# Patient Record
Sex: MALE | Race: BLACK OR AFRICAN AMERICAN | Employment: UNEMPLOYED
[De-identification: names, ages, dates, MRNs, and addresses within clinical notes are randomized per-mention and may not be internally consistent; named-entity substitution may affect disease eponyms.]

---

## 2023-07-26 ENCOUNTER — HOSPITAL ENCOUNTER (EMERGENCY)
Facility: HOSPITAL | Age: 73
Discharge: HOME HEALTH | End: 2023-07-26
Attending: STUDENT IN AN ORGANIZED HEALTH CARE EDUCATION/TRAINING PROGRAM
Payer: MEDICARE

## 2023-07-26 ENCOUNTER — APPOINTMENT (OUTPATIENT)
Facility: HOSPITAL | Age: 73
End: 2023-07-26
Payer: MEDICARE

## 2023-07-26 VITALS
BODY MASS INDEX: 24.92 KG/M2 | DIASTOLIC BLOOD PRESSURE: 91 MMHG | RESPIRATION RATE: 20 BRPM | OXYGEN SATURATION: 100 % | HEIGHT: 71 IN | WEIGHT: 178 LBS | HEART RATE: 70 BPM | TEMPERATURE: 97.9 F | SYSTOLIC BLOOD PRESSURE: 133 MMHG

## 2023-07-26 DIAGNOSIS — L03.90 CELLULITIS, UNSPECIFIED CELLULITIS SITE: Primary | ICD-10-CM

## 2023-07-26 LAB
ALBUMIN SERPL-MCNC: 4 G/DL (ref 3.4–5)
ALBUMIN/GLOB SERPL: 0.9 (ref 0.8–1.7)
ALP SERPL-CCNC: 78 U/L (ref 45–117)
ALT SERPL-CCNC: 19 U/L (ref 16–61)
ANION GAP SERPL CALC-SCNC: 5 MMOL/L (ref 3–18)
AST SERPL-CCNC: 9 U/L (ref 10–38)
BASOPHILS # BLD: 0 K/UL (ref 0–0.1)
BASOPHILS NFR BLD: 1 % (ref 0–2)
BILIRUB SERPL-MCNC: 0.8 MG/DL (ref 0.2–1)
BUN SERPL-MCNC: 32 MG/DL (ref 7–18)
BUN/CREAT SERPL: 14 (ref 12–20)
CALCIUM SERPL-MCNC: 9.6 MG/DL (ref 8.5–10.1)
CHLORIDE SERPL-SCNC: 109 MMOL/L (ref 100–111)
CO2 SERPL-SCNC: 25 MMOL/L (ref 21–32)
CREAT SERPL-MCNC: 2.27 MG/DL (ref 0.6–1.3)
CRP SERPL-MCNC: 1 MG/DL (ref 0–0.3)
DIFFERENTIAL METHOD BLD: ABNORMAL
EOSINOPHIL # BLD: 0.3 K/UL (ref 0–0.4)
EOSINOPHIL NFR BLD: 4 % (ref 0–5)
ERYTHROCYTE [DISTWIDTH] IN BLOOD BY AUTOMATED COUNT: 12.2 % (ref 11.6–14.5)
ERYTHROCYTE [SEDIMENTATION RATE] IN BLOOD: 6 MM/HR (ref 0–20)
GLOBULIN SER CALC-MCNC: 4.6 G/DL (ref 2–4)
GLUCOSE SERPL-MCNC: 113 MG/DL (ref 74–99)
HCT VFR BLD AUTO: 47.1 % (ref 36–48)
HGB BLD-MCNC: 15.1 G/DL (ref 13–16)
IMM GRANULOCYTES # BLD AUTO: 0 K/UL (ref 0–0.04)
IMM GRANULOCYTES NFR BLD AUTO: 0 % (ref 0–0.5)
LYMPHOCYTES # BLD: 1.1 K/UL (ref 0.9–3.6)
LYMPHOCYTES NFR BLD: 17 % (ref 21–52)
MCH RBC QN AUTO: 27.6 PG (ref 24–34)
MCHC RBC AUTO-ENTMCNC: 32.1 G/DL (ref 31–37)
MCV RBC AUTO: 85.9 FL (ref 78–100)
MONOCYTES # BLD: 0.6 K/UL (ref 0.05–1.2)
MONOCYTES NFR BLD: 10 % (ref 3–10)
NEUTS SEG # BLD: 4.2 K/UL (ref 1.8–8)
NEUTS SEG NFR BLD: 68 % (ref 40–73)
NRBC # BLD: 0 K/UL (ref 0–0.01)
NRBC BLD-RTO: 0 PER 100 WBC
PLATELET # BLD AUTO: 255 K/UL (ref 135–420)
PMV BLD AUTO: 9.8 FL (ref 9.2–11.8)
POTASSIUM SERPL-SCNC: 4.3 MMOL/L (ref 3.5–5.5)
PROT SERPL-MCNC: 8.6 G/DL (ref 6.4–8.2)
RBC # BLD AUTO: 5.48 M/UL (ref 4.35–5.65)
SODIUM SERPL-SCNC: 139 MMOL/L (ref 136–145)
WBC # BLD AUTO: 6.2 K/UL (ref 4.6–13.2)

## 2023-07-26 PROCEDURE — 80053 COMPREHEN METABOLIC PANEL: CPT

## 2023-07-26 PROCEDURE — 85652 RBC SED RATE AUTOMATED: CPT

## 2023-07-26 PROCEDURE — 73630 X-RAY EXAM OF FOOT: CPT

## 2023-07-26 PROCEDURE — 99284 EMERGENCY DEPT VISIT MOD MDM: CPT

## 2023-07-26 PROCEDURE — 86140 C-REACTIVE PROTEIN: CPT

## 2023-07-26 PROCEDURE — 85025 COMPLETE CBC W/AUTO DIFF WBC: CPT

## 2023-07-26 RX ORDER — CEPHALEXIN 500 MG/1
500 CAPSULE ORAL 4 TIMES DAILY
Qty: 28 CAPSULE | Refills: 0 | Status: SHIPPED | OUTPATIENT
Start: 2023-07-26 | End: 2023-08-02

## 2023-07-26 ASSESSMENT — PAIN - FUNCTIONAL ASSESSMENT: PAIN_FUNCTIONAL_ASSESSMENT: NONE - DENIES PAIN

## 2023-07-26 ASSESSMENT — ENCOUNTER SYMPTOMS
EYE DISCHARGE: 0
SHORTNESS OF BREATH: 0
ABDOMINAL PAIN: 0
DIARRHEA: 0
NAUSEA: 0
SORE THROAT: 0
COUGH: 0

## 2023-07-26 NOTE — ED PROVIDER NOTES
likely also gout arthropathy   versus other inflammatory arthropathy and superimposed secondary osteoarthrosis. Medical Decision Making   I am the first provider for this patient. I reviewed the vital signs, available nursing notes, past medical history, past surgical history, family history and social history. Vital Signs-Reviewed the patient's vital signs. EKG: All EKG's are interpreted by the Emergency Department Physician who either signs or Co-signs this chart in the absence of a cardiologist.    Records Reviewed: Nursing Notes and Old Medical Records     Procedures: None   Procedures    Provider Notes (Medical Decision Making):     Differential: Osteomyelitis, abscess, cellulitis, ulceration    Plan: We will order x-ray to evaluate for osteomyelitis although I have a low suspicion for this. Will order basic labs. ED Course as of 07/27/23 1343   Wed Jul 26, 2023   1201 Have reviewed labs and added sed rate and crp [CS]      ED Course User Index  [CS] ANGEL Paulino     Discussed overall reassuring work-up with patient. Extremely low suspicion for septic joint given patient does not have pain with range of motion is afebrile, reassuring white blood cell count and normal sed rate. More concern for cellulitis. Will discharge home with antibiotics and have stressed that the patient call podiatry tomorrow first thing and make a close follow-up appointment. Return precautions given. Was discharged home. MED RECONCILIATION:  No current facility-administered medications for this encounter. Current Outpatient Medications   Medication Sig    cephALEXin (KEFLEX) 500 MG capsule Take 1 capsule by mouth 4 times daily for 7 days       Disposition:  Home     DISCHARGE NOTE:   Pt has been reexamined. Patient has no new complaints, changes, or physical findings. Care plan outlined and precautions discussed. Results of workup were reviewed with the patient.  All medications were

## 2023-07-26 NOTE — ED TRIAGE NOTES
Pt presents with sore to top of right foot, reports it has been there for a few days. Denies hx of DM, hx of severe arthritis.

## 2023-07-26 NOTE — ED NOTES
SMALL WOUND NOTED TO RIGHT LOWER FOOT, NO DRAINAGE SEEN, WOUND PINK AND GRANULATING TISSUE NOTED. PATIENT NOTED TO HAVE POOR FOOT HYGIENE , WITH OTHER FARZANA OF FOOT NOTED TO BE CRUSTY AND FLAKY AND TOE NAILS LONG AND CURLY. PATIENT SEEN BY PA. PATIENT HAD BLOODS SENT TO THE LAB AND 20G PERIPHERAL IV TO RIGHT AC.       Indu Cedeno RN  07/26/23 1037

## 2023-07-26 NOTE — ED NOTES
Patient noted stable and presenting in no acute distress. Discharge instructions and medication list reviewed (as required) with the patient. All questions and concerns were addressed at this time, and the patient expresses understanding. Patient released with vitals noted as recorded.         Flaca Wang RN  07/26/23 9207

## 2023-11-22 ENCOUNTER — HOSPITAL ENCOUNTER (INPATIENT)
Facility: HOSPITAL | Age: 73
LOS: 9 days | Discharge: HOME | End: 2023-12-01
Attending: STUDENT IN AN ORGANIZED HEALTH CARE EDUCATION/TRAINING PROGRAM | Admitting: INTERNAL MEDICINE
Payer: MEDICARE

## 2023-11-22 ENCOUNTER — APPOINTMENT (OUTPATIENT)
Facility: HOSPITAL | Age: 73
End: 2023-11-22
Payer: MEDICARE

## 2023-11-22 DIAGNOSIS — J96.01 ACUTE HYPOXIC RESPIRATORY FAILURE (HCC): ICD-10-CM

## 2023-11-22 DIAGNOSIS — I10 HYPERTENSION, UNSPECIFIED TYPE: ICD-10-CM

## 2023-11-22 DIAGNOSIS — R91.8 MASS OF RIGHT LUNG: ICD-10-CM

## 2023-11-22 DIAGNOSIS — I26.99 ACUTE PULMONARY THROMBOEMBOLISM (HCC): Primary | ICD-10-CM

## 2023-11-22 PROBLEM — Z86.711 HX OF PULMONARY EMBOLUS: Status: RESOLVED | Noted: 2023-11-22 | Resolved: 2023-11-22

## 2023-11-22 PROBLEM — Z86.711 HX OF PULMONARY EMBOLUS: Status: ACTIVE | Noted: 2023-11-22

## 2023-11-22 PROBLEM — J90 PLEURAL EFFUSION ON RIGHT: Status: ACTIVE | Noted: 2023-11-22

## 2023-11-22 PROBLEM — R09.02 HYPOXIA: Status: ACTIVE | Noted: 2023-11-22

## 2023-11-22 PROBLEM — N18.4 CKD (CHRONIC KIDNEY DISEASE) STAGE 4, GFR 15-29 ML/MIN (HCC): Status: ACTIVE | Noted: 2023-11-22

## 2023-11-22 LAB
ALBUMIN FLD-MCNC: 2.9 G/DL
ALBUMIN SERPL-MCNC: 3.9 G/DL (ref 3.4–5)
ALBUMIN/GLOB SERPL: 0.8 (ref 0.8–1.7)
ALP SERPL-CCNC: 80 U/L (ref 45–117)
ALT SERPL-CCNC: 19 U/L (ref 16–61)
AMYLASE FLD-CCNC: 138 U/L
ANION GAP SERPL CALC-SCNC: 6 MMOL/L (ref 3–18)
APPEARANCE FLD: ABNORMAL
APPEARANCE UR: CLEAR
APTT PPP: 31.3 SEC (ref 23–36.4)
APTT PPP: >180 SEC (ref 23–36.4)
AST SERPL-CCNC: 12 U/L (ref 10–38)
BACTERIA URNS QL MICRO: ABNORMAL /HPF
BASOPHILS # BLD: 0.1 K/UL (ref 0–0.1)
BASOPHILS NFR BLD: 1 % (ref 0–2)
BILIRUB SERPL-MCNC: 0.7 MG/DL (ref 0.2–1)
BILIRUB UR QL: NEGATIVE
BODY FLD TYPE: NORMAL
BUN SERPL-MCNC: 42 MG/DL (ref 7–18)
BUN/CREAT SERPL: 16 (ref 12–20)
CALCIUM SERPL-MCNC: 9.5 MG/DL (ref 8.5–10.1)
CHLORIDE SERPL-SCNC: 111 MMOL/L (ref 100–111)
CO2 SERPL-SCNC: 22 MMOL/L (ref 21–32)
COLOR FLD: ABNORMAL
COLOR UR: YELLOW
CREAT SERPL-MCNC: 2.6 MG/DL (ref 0.6–1.3)
DIFFERENTIAL METHOD BLD: ABNORMAL
EKG ATRIAL RATE: 75 BPM
EKG DIAGNOSIS: NORMAL
EKG P AXIS: 47 DEGREES
EKG P-R INTERVAL: 114 MS
EKG Q-T INTERVAL: 376 MS
EKG QRS DURATION: 82 MS
EKG QTC CALCULATION (BAZETT): 419 MS
EKG R AXIS: 29 DEGREES
EKG T AXIS: 1 DEGREES
EKG VENTRICULAR RATE: 75 BPM
EOSINOPHIL # BLD: 0.1 K/UL (ref 0–0.4)
EOSINOPHIL NFR BLD: 2 % (ref 0–5)
EOSINOPHIL NFR FLD MANUAL: 0 %
EPITH CASTS URNS QL MICRO: ABNORMAL /LPF (ref 0–5)
ERYTHROCYTE [DISTWIDTH] IN BLOOD BY AUTOMATED COUNT: 12.4 % (ref 11.6–14.5)
FLUAV RNA SPEC QL NAA+PROBE: NOT DETECTED
FLUBV RNA SPEC QL NAA+PROBE: NOT DETECTED
GLOBULIN SER CALC-MCNC: 4.8 G/DL (ref 2–4)
GLUCOSE FLD-MCNC: 150 MG/DL
GLUCOSE SERPL-MCNC: 161 MG/DL (ref 74–99)
GLUCOSE UR STRIP.AUTO-MCNC: 100 MG/DL
HCT VFR BLD AUTO: 50.4 % (ref 36–48)
HGB BLD-MCNC: 16.2 G/DL (ref 13–16)
HGB UR QL STRIP: ABNORMAL
IMM GRANULOCYTES # BLD AUTO: 0 K/UL (ref 0–0.04)
IMM GRANULOCYTES NFR BLD AUTO: 1 % (ref 0–0.5)
INR PPP: 1.1 (ref 0.9–1.1)
KETONES UR QL STRIP.AUTO: NEGATIVE MG/DL
LDH FLD L TO P-CCNC: 350 U/L
LDH SERPL L TO P-CCNC: 193 U/L (ref 81–234)
LDH SERPL L TO P-CCNC: 256 U/L (ref 81–234)
LEUKOCYTE ESTERASE UR QL STRIP.AUTO: NEGATIVE
LYMPHOCYTES # BLD: 0.8 K/UL (ref 0.9–3.6)
LYMPHOCYTES NFR BLD: 9 % (ref 21–52)
LYMPHOCYTES NFR FLD: 79 %
MCH RBC QN AUTO: 27.3 PG (ref 24–34)
MCHC RBC AUTO-ENTMCNC: 32.1 G/DL (ref 31–37)
MCV RBC AUTO: 85 FL (ref 78–100)
MONOCYTES # BLD: 0.5 K/UL (ref 0.05–1.2)
MONOCYTES NFR BLD: 6 % (ref 3–10)
MONOCYTES NFR FLD: 7 %
NEUTROPHILS NFR FLD: 14 % (ref 0–25)
NEUTS BAND # FLD: 0 %
NEUTS SEG # BLD: 7.4 K/UL (ref 1.8–8)
NEUTS SEG NFR BLD: 83 % (ref 40–73)
NITRITE UR QL STRIP.AUTO: NEGATIVE
NRBC # BLD: 0 K/UL (ref 0–0.01)
NRBC BLD-RTO: 0 PER 100 WBC
NT PRO BNP: 417 PG/ML (ref 0–1800)
NUC CELL # FLD: 512 /CU MM
PH FLD: 7.4
PH UR STRIP: 5 (ref 5–8)
PLATELET # BLD AUTO: 308 K/UL (ref 135–420)
PMV BLD AUTO: 9.7 FL (ref 9.2–11.8)
POTASSIUM SERPL-SCNC: 4.5 MMOL/L (ref 3.5–5.5)
PROCALCITONIN SERPL-MCNC: 0.15 NG/ML
PROT FLD-MCNC: 5.1 G/DL
PROT SERPL-MCNC: 8.1 G/DL (ref 6.4–8.2)
PROT SERPL-MCNC: 8.7 G/DL (ref 6.4–8.2)
PROT UR STRIP-MCNC: 30 MG/DL
PROTHROMBIN TIME: 14.7 SEC (ref 11.9–14.7)
RBC # BLD AUTO: 5.93 M/UL (ref 4.35–5.65)
RBC # FLD: ABNORMAL /CU MM
RBC #/AREA URNS HPF: ABNORMAL /HPF (ref 0–5)
SARS-COV-2 RNA RESP QL NAA+PROBE: NOT DETECTED
SODIUM SERPL-SCNC: 139 MMOL/L (ref 136–145)
SP GR UR REFRACTOMETRY: 1.01 (ref 1–1.03)
SPECIMEN SOURCE FLD: ABNORMAL
SPECIMEN SOURCE FLD: NORMAL
TROPONIN I SERPL HS-MCNC: 53 NG/L (ref 0–78)
TROPONIN I SERPL HS-MCNC: 62 NG/L (ref 0–78)
TSH SERPL DL<=0.05 MIU/L-ACNC: 2.75 UIU/ML (ref 0.36–3.74)
URATE SERPL-MCNC: 10.2 MG/DL (ref 2.6–7.2)
UROBILINOGEN UR QL STRIP.AUTO: 0.2 EU/DL (ref 0.2–1)
WBC # BLD AUTO: 8.9 K/UL (ref 4.6–13.2)
WBC URNS QL MICRO: ABNORMAL /HPF (ref 0–4)

## 2023-11-22 PROCEDURE — 84550 ASSAY OF BLOOD/URIC ACID: CPT

## 2023-11-22 PROCEDURE — 88305 TISSUE EXAM BY PATHOLOGIST: CPT

## 2023-11-22 PROCEDURE — 2580000003 HC RX 258: Performed by: INTERNAL MEDICINE

## 2023-11-22 PROCEDURE — 6370000000 HC RX 637 (ALT 250 FOR IP): Performed by: INTERNAL MEDICINE

## 2023-11-22 PROCEDURE — 6360000002 HC RX W HCPCS: Performed by: STUDENT IN AN ORGANIZED HEALTH CARE EDUCATION/TRAINING PROGRAM

## 2023-11-22 PROCEDURE — 80053 COMPREHEN METABOLIC PANEL: CPT

## 2023-11-22 PROCEDURE — 71045 X-RAY EXAM CHEST 1 VIEW: CPT

## 2023-11-22 PROCEDURE — 85610 PROTHROMBIN TIME: CPT

## 2023-11-22 PROCEDURE — 83615 LACTATE (LD) (LDH) ENZYME: CPT

## 2023-11-22 PROCEDURE — 6360000004 HC RX CONTRAST MEDICATION: Performed by: STUDENT IN AN ORGANIZED HEALTH CARE EDUCATION/TRAINING PROGRAM

## 2023-11-22 PROCEDURE — 82042 OTHER SOURCE ALBUMIN QUAN EA: CPT

## 2023-11-22 PROCEDURE — 83880 ASSAY OF NATRIURETIC PEPTIDE: CPT

## 2023-11-22 PROCEDURE — 93010 ELECTROCARDIOGRAM REPORT: CPT | Performed by: INTERNAL MEDICINE

## 2023-11-22 PROCEDURE — 85025 COMPLETE CBC W/AUTO DIFF WBC: CPT

## 2023-11-22 PROCEDURE — 82945 GLUCOSE OTHER FLUID: CPT

## 2023-11-22 PROCEDURE — 1100000003 HC PRIVATE W/ TELEMETRY

## 2023-11-22 PROCEDURE — 94761 N-INVAS EAR/PLS OXIMETRY MLT: CPT

## 2023-11-22 PROCEDURE — 81001 URINALYSIS AUTO W/SCOPE: CPT

## 2023-11-22 PROCEDURE — 93005 ELECTROCARDIOGRAM TRACING: CPT | Performed by: STUDENT IN AN ORGANIZED HEALTH CARE EDUCATION/TRAINING PROGRAM

## 2023-11-22 PROCEDURE — 85730 THROMBOPLASTIN TIME PARTIAL: CPT

## 2023-11-22 PROCEDURE — 87205 SMEAR GRAM STAIN: CPT

## 2023-11-22 PROCEDURE — 84157 ASSAY OF PROTEIN OTHER: CPT

## 2023-11-22 PROCEDURE — 71275 CT ANGIOGRAPHY CHEST: CPT

## 2023-11-22 PROCEDURE — 2709999900 US THORACENTESIS

## 2023-11-22 PROCEDURE — 99223 1ST HOSP IP/OBS HIGH 75: CPT | Performed by: INTERNAL MEDICINE

## 2023-11-22 PROCEDURE — 0W993ZZ DRAINAGE OF RIGHT PLEURAL CAVITY, PERCUTANEOUS APPROACH: ICD-10-PCS | Performed by: RADIOLOGY

## 2023-11-22 PROCEDURE — 84484 ASSAY OF TROPONIN QUANT: CPT

## 2023-11-22 PROCEDURE — 84145 PROCALCITONIN (PCT): CPT

## 2023-11-22 PROCEDURE — 82150 ASSAY OF AMYLASE: CPT

## 2023-11-22 PROCEDURE — 89051 BODY FLUID CELL COUNT: CPT

## 2023-11-22 PROCEDURE — 2580000003 HC RX 258: Performed by: STUDENT IN AN ORGANIZED HEALTH CARE EDUCATION/TRAINING PROGRAM

## 2023-11-22 PROCEDURE — 84443 ASSAY THYROID STIM HORMONE: CPT

## 2023-11-22 PROCEDURE — 88341 IMHCHEM/IMCYTCHM EA ADD ANTB: CPT

## 2023-11-22 PROCEDURE — 88342 IMHCHEM/IMCYTCHM 1ST ANTB: CPT

## 2023-11-22 PROCEDURE — 84155 ASSAY OF PROTEIN SERUM: CPT

## 2023-11-22 PROCEDURE — 99285 EMERGENCY DEPT VISIT HI MDM: CPT

## 2023-11-22 PROCEDURE — 84311 SPECTROPHOTOMETRY: CPT

## 2023-11-22 PROCEDURE — 88112 CYTOPATH CELL ENHANCE TECH: CPT

## 2023-11-22 PROCEDURE — 87015 SPECIMEN INFECT AGNT CONCNTJ: CPT

## 2023-11-22 PROCEDURE — 83986 ASSAY PH BODY FLUID NOS: CPT

## 2023-11-22 PROCEDURE — 87636 SARSCOV2 & INF A&B AMP PRB: CPT

## 2023-11-22 PROCEDURE — 99222 1ST HOSP IP/OBS MODERATE 55: CPT | Performed by: INTERNAL MEDICINE

## 2023-11-22 PROCEDURE — 87070 CULTURE OTHR SPECIMN AEROBIC: CPT

## 2023-11-22 RX ORDER — FAMOTIDINE 20 MG/1
10 TABLET, FILM COATED ORAL DAILY
Status: DISCONTINUED | OUTPATIENT
Start: 2023-11-23 | End: 2023-12-01 | Stop reason: HOSPADM

## 2023-11-22 RX ORDER — AMLODIPINE BESYLATE 10 MG/1
10 TABLET ORAL DAILY
Status: ON HOLD | COMMUNITY
Start: 2023-11-14 | End: 2023-12-01 | Stop reason: HOSPADM

## 2023-11-22 RX ORDER — CLONIDINE HYDROCHLORIDE 0.2 MG/1
0.2 TABLET ORAL 2 TIMES DAILY
Status: ON HOLD | COMMUNITY
Start: 2023-11-14 | End: 2023-12-01 | Stop reason: HOSPADM

## 2023-11-22 RX ORDER — LOSARTAN POTASSIUM 50 MG/1
50 TABLET ORAL DAILY
Status: ON HOLD | COMMUNITY
Start: 2023-11-09 | End: 2023-12-01 | Stop reason: HOSPADM

## 2023-11-22 RX ORDER — TORSEMIDE 20 MG/1
20 TABLET ORAL DAILY
COMMUNITY
Start: 2023-11-09 | End: 2023-11-22 | Stop reason: CLARIF

## 2023-11-22 RX ORDER — CLONIDINE HYDROCHLORIDE 0.1 MG/1
0.2 TABLET ORAL
COMMUNITY
Start: 2023-04-12 | End: 2023-11-22 | Stop reason: CLARIF

## 2023-11-22 RX ORDER — ONDANSETRON 2 MG/ML
4 INJECTION INTRAMUSCULAR; INTRAVENOUS EVERY 6 HOURS PRN
Status: DISCONTINUED | OUTPATIENT
Start: 2023-11-22 | End: 2023-12-01 | Stop reason: HOSPADM

## 2023-11-22 RX ORDER — 0.9 % SODIUM CHLORIDE 0.9 %
1000 INTRAVENOUS SOLUTION INTRAVENOUS ONCE
Status: COMPLETED | OUTPATIENT
Start: 2023-11-22 | End: 2023-11-22

## 2023-11-22 RX ORDER — SODIUM CHLORIDE 0.9 % (FLUSH) 0.9 %
5-40 SYRINGE (ML) INJECTION PRN
Status: DISCONTINUED | OUTPATIENT
Start: 2023-11-22 | End: 2023-12-01 | Stop reason: HOSPADM

## 2023-11-22 RX ORDER — SODIUM CHLORIDE 0.9 % (FLUSH) 0.9 %
5-40 SYRINGE (ML) INJECTION EVERY 12 HOURS SCHEDULED
Status: DISCONTINUED | OUTPATIENT
Start: 2023-11-22 | End: 2023-12-01 | Stop reason: HOSPADM

## 2023-11-22 RX ORDER — HEPARIN SODIUM 1000 [USP'U]/ML
80 INJECTION, SOLUTION INTRAVENOUS; SUBCUTANEOUS ONCE
Status: COMPLETED | OUTPATIENT
Start: 2023-11-22 | End: 2023-11-22

## 2023-11-22 RX ORDER — ACETAMINOPHEN 325 MG/1
650 TABLET ORAL EVERY 6 HOURS PRN
Status: DISCONTINUED | OUTPATIENT
Start: 2023-11-22 | End: 2023-12-01 | Stop reason: HOSPADM

## 2023-11-22 RX ORDER — CARVEDILOL 25 MG/1
25 TABLET ORAL 2 TIMES DAILY WITH MEALS
Status: DISCONTINUED | OUTPATIENT
Start: 2023-11-22 | End: 2023-11-23

## 2023-11-22 RX ORDER — SODIUM CHLORIDE 9 MG/ML
INJECTION, SOLUTION INTRAVENOUS PRN
Status: DISCONTINUED | OUTPATIENT
Start: 2023-11-22 | End: 2023-12-01 | Stop reason: HOSPADM

## 2023-11-22 RX ORDER — IODIXANOL 320 MG/ML
80 INJECTION, SOLUTION INTRAVASCULAR
Status: COMPLETED | OUTPATIENT
Start: 2023-11-22 | End: 2023-11-22

## 2023-11-22 RX ORDER — CLONIDINE HYDROCHLORIDE 0.1 MG/1
0.2 TABLET ORAL 2 TIMES DAILY
Status: DISCONTINUED | OUTPATIENT
Start: 2023-11-22 | End: 2023-11-23

## 2023-11-22 RX ORDER — HEPARIN SODIUM 10000 [USP'U]/100ML
5-30 INJECTION, SOLUTION INTRAVENOUS CONTINUOUS
Status: DISCONTINUED | OUTPATIENT
Start: 2023-11-22 | End: 2023-11-30

## 2023-11-22 RX ORDER — POLYETHYLENE GLYCOL 3350 17 G/17G
17 POWDER, FOR SOLUTION ORAL DAILY PRN
Status: DISCONTINUED | OUTPATIENT
Start: 2023-11-22 | End: 2023-12-01 | Stop reason: HOSPADM

## 2023-11-22 RX ORDER — ALLOPURINOL 100 MG/1
100 TABLET ORAL DAILY
COMMUNITY
Start: 2021-06-28

## 2023-11-22 RX ORDER — LANOLIN ALCOHOL/MO/W.PET/CERES
3 CREAM (GRAM) TOPICAL NIGHTLY
Status: DISCONTINUED | OUTPATIENT
Start: 2023-11-22 | End: 2023-12-01 | Stop reason: HOSPADM

## 2023-11-22 RX ORDER — ACETAMINOPHEN 650 MG/1
650 SUPPOSITORY RECTAL EVERY 6 HOURS PRN
Status: DISCONTINUED | OUTPATIENT
Start: 2023-11-22 | End: 2023-12-01 | Stop reason: HOSPADM

## 2023-11-22 RX ORDER — CARVEDILOL 25 MG/1
25 TABLET ORAL 2 TIMES DAILY WITH MEALS
Status: ON HOLD | COMMUNITY
Start: 2023-11-14 | End: 2023-12-01 | Stop reason: HOSPADM

## 2023-11-22 RX ORDER — HEPARIN SODIUM 1000 [USP'U]/ML
40 INJECTION, SOLUTION INTRAVENOUS; SUBCUTANEOUS PRN
Status: DISCONTINUED | OUTPATIENT
Start: 2023-11-22 | End: 2023-11-30

## 2023-11-22 RX ORDER — AMLODIPINE BESYLATE 10 MG/1
10 TABLET ORAL NIGHTLY
Status: DISCONTINUED | OUTPATIENT
Start: 2023-11-22 | End: 2023-11-26

## 2023-11-22 RX ORDER — ONDANSETRON 4 MG/1
4 TABLET, ORALLY DISINTEGRATING ORAL EVERY 8 HOURS PRN
Status: DISCONTINUED | OUTPATIENT
Start: 2023-11-22 | End: 2023-12-01 | Stop reason: HOSPADM

## 2023-11-22 RX ORDER — HEPARIN SODIUM 1000 [USP'U]/ML
80 INJECTION, SOLUTION INTRAVENOUS; SUBCUTANEOUS PRN
Status: DISCONTINUED | OUTPATIENT
Start: 2023-11-22 | End: 2023-11-30

## 2023-11-22 RX ADMIN — Medication 3 MG: at 20:38

## 2023-11-22 RX ADMIN — HEPARIN SODIUM 6170 UNITS: 1000 INJECTION, SOLUTION INTRAVENOUS; SUBCUTANEOUS at 12:26

## 2023-11-22 RX ADMIN — CLONIDINE HYDROCHLORIDE 0.2 MG: 0.1 TABLET ORAL at 14:14

## 2023-11-22 RX ADMIN — HEPARIN SODIUM 18 UNITS/KG/HR: 10000 INJECTION, SOLUTION INTRAVENOUS at 12:27

## 2023-11-22 RX ADMIN — CARVEDILOL 25 MG: 25 TABLET, FILM COATED ORAL at 17:23

## 2023-11-22 RX ADMIN — IODIXANOL 60 ML: 320 INJECTION, SOLUTION INTRAVASCULAR at 11:25

## 2023-11-22 RX ADMIN — SODIUM CHLORIDE 1000 ML: 9 INJECTION, SOLUTION INTRAVENOUS at 11:34

## 2023-11-22 RX ADMIN — CLONIDINE HYDROCHLORIDE 0.2 MG: 0.1 TABLET ORAL at 20:38

## 2023-11-22 RX ADMIN — AMLODIPINE BESYLATE 10 MG: 10 TABLET ORAL at 20:38

## 2023-11-22 RX ADMIN — SODIUM CHLORIDE, PRESERVATIVE FREE 10 ML: 5 INJECTION INTRAVENOUS at 20:54

## 2023-11-22 ASSESSMENT — ENCOUNTER SYMPTOMS
ABDOMINAL DISTENTION: 0
SHORTNESS OF BREATH: 1
DIARRHEA: 0
BLOOD IN STOOL: 0
CHEST TIGHTNESS: 0
ABDOMINAL PAIN: 0
FACIAL SWELLING: 0
BACK PAIN: 0
EYE PAIN: 0
CONSTIPATION: 0

## 2023-11-22 NOTE — ED NOTES
Pt back from CT; pt placed back on cont VS monitoring. Pt reports some SOB; O2 was 88% on 2L O2 via NC, RN increased flow rate to 4.5L; O2 improved to 94%.       Kenya Briggs RN  11/22/23 0747

## 2023-11-22 NOTE — ED NOTES
Given with healthy choice meal and a cup of iced water per MD approval.    Still on o2 at 4lpm via NC. Dr. Nilay Kim at bedside.      Fiona Curtis RN  11/22/23 9440

## 2023-11-22 NOTE — ED PROVIDER NOTES
EMERGENCY DEPARTMENT HISTORY AND PHYSICAL EXAM    5:36 PM      Date: 11/22/2023  Patient Name: Lynne Khan    History of Presenting Illness     Chief Complaint   Patient presents with    Shortness of Breath              History From: Patient  HPI  68-year-old male with history of hypertension who presents with shortness of breath. Patient says the symptoms been going on for 1 week. Aggravated when he lays flat. No alleviating factors. Denies any sick contacts, recent travel, or any other precipitating factors. When assessing ROS he denies any lower extremity edema, hemoptysis, chest pain, nausea, vomiting, abdominal pain, or any other changes. Nursing Notes were all reviewed and agreed with or any disagreements were addressed in the HPI. PCP: No primary care provider on file.     Current Facility-Administered Medications   Medication Dose Route Frequency Provider Last Rate Last Admin    heparin (porcine) injection 6,170 Units  80 Units/kg IntraVENous PRN Heron Schmitz MD        heparin (porcine) injection 3,080 Units  40 Units/kg IntraVENous PRN Heron Schmitz MD        [Held by provider] heparin 25,000 units in dextrose 5% 250 mL (premix) infusion  5-30 Units/kg/hr IntraVENous Continuous Minus MD Nadir   Stopped at 11/22/23 1423    sodium chloride flush 0.9 % injection 5-40 mL  5-40 mL IntraVENous 2 times per day Kusum Shipman MD        sodium chloride flush 0.9 % injection 5-40 mL  5-40 mL IntraVENous PRN Kusum Shipman MD        0.9 % sodium chloride infusion   IntraVENous PRN Kusum Shipman MD        ondansetron (ZOFRAN-ODT) disintegrating tablet 4 mg  4 mg Oral Q8H PRN Kusum Shipman MD        Or    ondansetron [facility]) injection 4 mg  4 mg IntraVENous Q6H PRN Kusum Shipman MD        polyethylene glycol (GLYCOLAX) packet 17 g  17 g Oral Daily PRN Kusum Shipman MD        acetaminophen (TYLENOL) tablet 650 mg  650 mg Oral Q6H PRN Kusum Shipman MD        Or    acetaminophen (TYLENOL) suppository

## 2023-11-22 NOTE — ED NOTES
Pt arrived ot the ED with c/o SOB x \"few days\". Niece is at bedside. Pt reports that he feels as though he cannot catch his breath, and it worsens when he lays down. No hx of lung disease. Pt was 88% on room air; RN placed pt on 2L O2 via NC, O2 improved to 94%.           Godwin Gomes RN  11/22/23 6791

## 2023-11-22 NOTE — ED NOTES
Pt reports SOB comes and goes, pt reports SOB worsens with movement and laying back. Pt is currently in high fowlers on 2L o2, reports no SOB at this moment.  O2 is 94%     Arleen Lynne, [address]  11/22/23 0125

## 2023-11-22 NOTE — PROCEDURES
RADIOLOGY POST THORACENTESIS NOTE     November 22, 2023       4:17 PM     :  ANGEL Winchester    Assistant:  None. Attending: Dr. Marshall Roblero    Procedure:  US-guided right thoracentesis     Pre-operative Diagnosis: Pleural effusion    Post-operative Diagnosis: same    Procedure Details: Informed consent obtained. Under ultrasound guidance, the largest pocket of fluid collection was identified. Percutaneous access was achieved using a posterior intercostal approach with a one step needle. 1% lidocaine was utilized for local anesthesia. The 5 [place] Yueh sheathed needle connected to manually controlled vacuum bottle. 1.5 L of clear, dark harika fluid removed. Images saved in PACS. Complications:  No immediate. Specimens: Yes    Estimated blood Loss: Minimal               Condition: Stable. Impression:  Successful right thoracentesis with a moderate pleural effusion remains. The procedure was discontinued in order to minimize the risk of pulmonary edema as this is the pt's first right thoracentesis. Plan: Post procedure chest xray pending.

## 2023-11-22 NOTE — ED NOTES
Dr. Abby Regalado. Asa at bedside, gave RN verbal order to stop heparin drip at 1415     Dr. Luanne Wesley also gave verbal order to order APTT.        Rajeev Lima RN  11/22/23 0719

## 2023-11-22 NOTE — PLAN OF CARE
Problem: Skin/Tissue Integrity  Goal: Absence of new skin breakdown  Description: 1. Monitor for areas of redness and/or skin breakdown  2. Assess vascular access sites hourly  3. Every 4-6 hours minimum:  Change oxygen saturation probe site  4. Every 4-6 hours:  If on nasal continuous positive airway pressure, respiratory therapy assess nares and determine need for appliance change or resting period.   Outcome: Progressing     Problem: Chronic Conditions and Co-morbidities  Goal: Patient's chronic conditions and co-morbidity symptoms are monitored and maintained or improved  Outcome: Progressing     Problem: Safety - Adult  Goal: Free from fall injury  Outcome: Progressing

## 2023-11-22 NOTE — CONSULTS
[facility]. Pulmonary, Critical Care, and Sleep Medicine    Initial Patient Consult    Name: Chito Silvestre MRN: [id number]   : 1950 [facility]   Date: 2023        IMPRESSION:   Acute hypoxic respiratory failure - on 4L NC. Likely multifactorial.  Acute pulmonary embolism - Left lobar and segmental pulmonary artery. No RHS on CT. Right pleural effusion - large; Suspect likely malignant effusion. Multiple pulmonary masses - CT with lobulated enhancing pleural masses, likely pleural deposits measuring 5.4 x 2.3 cm, 3.6 x 1.9 cm and mediastinal lymphadenopathy. Complete collapse of RLL and partial collapse of RUL noted. Gouty arthritis  HTN  CKD     Patient Active Problem List   Diagnosis    Hypoxia    Acute pulmonary thromboembolism (HCC)    Pleural effusion on right    CKD (chronic kidney disease) stage 4, GFR 15-29 ml/min (HCC)    Primary hypertension      RECOMMENDATIONS:   Hold Heparin gtt for now. Check aPTT and if subtherapeutic, will perform urgent bedside thoracentesis. Given massive size of right pleural effusion, he is at increased risk for re-expansion pulmonary edema. Thus, low threshold for transfer to higher level of care. Once thoracentesis is performed, may resume Heparin gtt 2 hours after procedure. Obtain TTE- r/o RHS  Supplemental oxygen to maintain SpO2 >88%; avoid hyperoxygenation  Antibiotics - hold for now  Aspiration precautions including elevating HOB >30deg  Aggressive pulmonary toileting/bronchial hygiene  Frequent incentive spirometry and flutter valve  Out patient testing - PFT, 6 min walk, Polysomnogram  Assess home Oxygen needs at discharge  PT/OT, OOB, ambulate with assistance as tolerated  DVT, PUD prophylaxis per primary service  Your medical management    Will continue to follow     Subjective: This patient has been seen and evaluated at the request of Dr. Rocio Gomez for Pleural effusion.  Patient is a 72y/o Male with a

## 2023-11-22 NOTE — ED NOTES
TRANSFER - OUT REPORT:    Verbal report given to [address] on [facility]  being transferred to  for routine progression of patient care       Report consisted of patient's Situation, Background, Assessment and   Recommendations(SBAR). Information from the following report(s) Nurse Handoff Report, ED Encounter Summary, ED SBAR, Adult Overview, Intake/Output, MAR, Recent Results, and Neuro Assessment was reviewed with the receiving nurse. Graham Fall Assessment:    Presents to emergency department  because of falls (Syncope, seizure, or loss of consciousness): No  Age > 70: Yes  Altered Mental Status, Intoxication with alcohol or substance confusion (Disorientation, impaired judgment, poor safety awaremess, or inability to follow instructions): No  Impaired Mobility: Ambulates or transfers with assistive devices or assistance; Unable to ambulate or transer.: No  Nursing Judgement: Yes          Lines:   Peripheral IV 11/22/23 Distal;Right; Anterior Cephalic (Active)       Peripheral IV 11/22/23 Left;Proximal;Anterior Forearm (Active)        Opportunity for questions and clarification was provided.                  Ariel Cr RN  11/22/23 5483

## 2023-11-22 NOTE — ED NOTES
Pt on continuous VS monitoring. Side rails up x 2, HOB elevated per pt comfort request, call bell/side table within reach, blankets provided. NAD voiced or noted. Pt denies further needs at this time.      Mynor Chavez RN  11/22/23 7354

## 2023-11-22 NOTE — PROGRESS NOTES
Patient with a GFR of 25. Patient is not hypoxic requiring 2-1/2 L of oxygen. Patient does not use oxygen at baseline. Risk outweigh benefits of giving contrast in the setting of ruling out pulmonary embolism.

## 2023-11-22 NOTE — ED NOTES
Pt using bedside urinal, RN at [facility] for safety      Lino Eckert RN  11/22/23 7747

## 2023-11-23 PROBLEM — J90 PLEURAL EFFUSION: Status: ACTIVE | Noted: 2023-11-23

## 2023-11-23 PROBLEM — R91.8 MASS OF RIGHT LUNG: Status: ACTIVE | Noted: 2023-11-23

## 2023-11-23 LAB
ANION GAP SERPL CALC-SCNC: 7 MMOL/L (ref 3–18)
APTT PPP: 114.1 SEC (ref 23–36.4)
APTT PPP: 165.9 SEC (ref 23–36.4)
APTT PPP: 30 SEC (ref 23–36.4)
BASOPHILS # BLD: 0.1 K/UL (ref 0–0.1)
BASOPHILS NFR BLD: 1 % (ref 0–2)
BUN SERPL-MCNC: 37 MG/DL (ref 7–18)
BUN/CREAT SERPL: 16 (ref 12–20)
CALCIUM SERPL-MCNC: 9 MG/DL (ref 8.5–10.1)
CHLORIDE SERPL-SCNC: 115 MMOL/L (ref 100–111)
CO2 SERPL-SCNC: 19 MMOL/L (ref 21–32)
CREAT SERPL-MCNC: 2.26 MG/DL (ref 0.6–1.3)
DIFFERENTIAL METHOD BLD: ABNORMAL
EOSINOPHIL # BLD: 0.1 K/UL (ref 0–0.4)
EOSINOPHIL NFR BLD: 1 % (ref 0–5)
ERYTHROCYTE [DISTWIDTH] IN BLOOD BY AUTOMATED COUNT: 12.4 % (ref 11.6–14.5)
GLUCOSE SERPL-MCNC: 119 MG/DL (ref 74–99)
HCT VFR BLD AUTO: 47.4 % (ref 36–48)
HGB BLD-MCNC: 14.7 G/DL (ref 13–16)
IMM GRANULOCYTES # BLD AUTO: 0 K/UL (ref 0–0.04)
IMM GRANULOCYTES NFR BLD AUTO: 0 % (ref 0–0.5)
LYMPHOCYTES # BLD: 0.9 K/UL (ref 0.9–3.6)
LYMPHOCYTES NFR BLD: 11 % (ref 21–52)
MCH RBC QN AUTO: 26.4 PG (ref 24–34)
MCHC RBC AUTO-ENTMCNC: 31 G/DL (ref 31–37)
MCV RBC AUTO: 85.3 FL (ref 78–100)
MONOCYTES # BLD: 1 K/UL (ref 0.05–1.2)
MONOCYTES NFR BLD: 11 % (ref 3–10)
NEUTS SEG # BLD: 6.8 K/UL (ref 1.8–8)
NEUTS SEG NFR BLD: 77 % (ref 40–73)
NRBC # BLD: 0 K/UL (ref 0–0.01)
NRBC BLD-RTO: 0 PER 100 WBC
PLATELET # BLD AUTO: 265 K/UL (ref 135–420)
PMV BLD AUTO: 10.5 FL (ref 9.2–11.8)
POTASSIUM SERPL-SCNC: 4.4 MMOL/L (ref 3.5–5.5)
RBC # BLD AUTO: 5.56 M/UL (ref 4.35–5.65)
SODIUM SERPL-SCNC: 141 MMOL/L (ref 136–145)
WBC # BLD AUTO: 8.9 K/UL (ref 4.6–13.2)

## 2023-11-23 PROCEDURE — 2580000003 HC RX 258: Performed by: INTERNAL MEDICINE

## 2023-11-23 PROCEDURE — 94761 N-INVAS EAR/PLS OXIMETRY MLT: CPT

## 2023-11-23 PROCEDURE — 85730 THROMBOPLASTIN TIME PARTIAL: CPT

## 2023-11-23 PROCEDURE — 85025 COMPLETE CBC W/AUTO DIFF WBC: CPT

## 2023-11-23 PROCEDURE — 6370000000 HC RX 637 (ALT 250 FOR IP): Performed by: HOSPITALIST

## 2023-11-23 PROCEDURE — 99232 SBSQ HOSP IP/OBS MODERATE 35: CPT | Performed by: INTERNAL MEDICINE

## 2023-11-23 PROCEDURE — 6370000000 HC RX 637 (ALT 250 FOR IP): Performed by: INTERNAL MEDICINE

## 2023-11-23 PROCEDURE — 36415 COLL VENOUS BLD VENIPUNCTURE: CPT

## 2023-11-23 PROCEDURE — 6360000002 HC RX W HCPCS: Performed by: STUDENT IN AN ORGANIZED HEALTH CARE EDUCATION/TRAINING PROGRAM

## 2023-11-23 PROCEDURE — 99232 SBSQ HOSP IP/OBS MODERATE 35: CPT | Performed by: HOSPITALIST

## 2023-11-23 PROCEDURE — 1100000003 HC PRIVATE W/ TELEMETRY

## 2023-11-23 PROCEDURE — 80048 BASIC METABOLIC PNL TOTAL CA: CPT

## 2023-11-23 RX ORDER — CLONIDINE HYDROCHLORIDE 0.1 MG/1
0.1 TABLET ORAL 2 TIMES DAILY
Status: DISCONTINUED | OUTPATIENT
Start: 2023-11-23 | End: 2023-11-26

## 2023-11-23 RX ORDER — CARVEDILOL 3.12 MG/1
3.12 TABLET ORAL 2 TIMES DAILY WITH MEALS
Status: DISCONTINUED | OUTPATIENT
Start: 2023-11-23 | End: 2023-11-25

## 2023-11-23 RX ADMIN — HEPARIN SODIUM 6170 UNITS: 1000 INJECTION INTRAVENOUS; SUBCUTANEOUS at 05:38

## 2023-11-23 RX ADMIN — Medication 3 MG: at 21:30

## 2023-11-23 RX ADMIN — FAMOTIDINE 10 MG: 20 TABLET ORAL at 09:02

## 2023-11-23 RX ADMIN — HEPARIN SODIUM 18 UNITS/KG/HR: 10000 INJECTION, SOLUTION INTRAVENOUS at 05:39

## 2023-11-23 RX ADMIN — SODIUM CHLORIDE, PRESERVATIVE FREE 10 ML: 5 INJECTION INTRAVENOUS at 09:03

## 2023-11-23 RX ADMIN — AMLODIPINE BESYLATE 10 MG: 10 TABLET ORAL at 21:30

## 2023-11-23 RX ADMIN — CLONIDINE HYDROCHLORIDE 0.1 MG: 0.1 TABLET ORAL at 21:30

## 2023-11-23 NOTE — PROGRESS NOTES
[facility]  Pulmonary, Critical Care, and Sleep Medicine    Name: Raissa Davison MRN: [id number]   : 1950 [facility]   Date: 2023        IMPRESSION:   Acute hypoxic respiratory failure -resolved; on RA. Likely multifactorial.  Acute pulmonary embolism - Left lobar and segmental pulmonary artery. No RHS on CT. TTE pending. Troponin and BNP wnl. Right pleural effusion - large; likely malignant effusion. S/p Rt thoracentesis () 1.5 L removed; exudative; cytology pending. Multiple pulmonary masses - CT with lobulated enhancing pleural masses, likely pleural deposits measuring 5.4 x 2.3 cm, 3.6 x 1.9 cm and mediastinal lymphadenopathy. Complete collapse of RLL and partial collapse of RUL noted. Gouty arthritis  HTN  CKD     Patient Active Problem List   Diagnosis    Hypoxia    Acute pulmonary thromboembolism (HCC)    Pleural effusion on right    CKD (chronic kidney disease) stage 4, GFR 15-29 ml/min (HCC)    Primary hypertension      PLAN:   Continue anticoagulation with heparin drip. Follow-up pleural fluid studies - culture & cytology  Patient requires biopsy of pulmonary masses; IR consulted  He may require repeat Right thoracentesis - will continue to evaluate; if performed, will again send for cytology testing  Obtain TTE- r/o RHS -pending  Supplemental oxygen to maintain SpO2 >88%; avoid hyperoxygenation - on RA this morning.   Antibiotics -no indication for antibiotic therapy from a pulmonary standpoint  Aspiration precautions including elevating HOB >30deg  Aggressive pulmonary toileting/bronchial hygiene  Frequent incentive spirometry and flutter valve    Assess home Oxygen needs at discharge  PT/OT, OOB, ambulate with assistance as tolerated  PUD prophylaxis per primary service  Your medical management  Out patient testing - PFT, 6 min walk, PET/CT  O/P: Follow-up pulmonary clinic with Dr. Rachna Butt    Will continue to follow     Subjective/Interval

## 2023-11-23 NOTE — PROGRESS NOTES
Advance Care Planning     Advance Care Planning Inpatient Note  Spiritual Care Department    Today's Date: 11/23/2023  Unit: [facility] [address] request from rounding. Upon review of chart and communication with care team, patient's decision making abilities are not in question. . Patient was/were present in the room during visit. Goals of ACP Conversation:  Discuss advance care planning documents    Health Care Decision Makers:     No healthcare decision makers have been documented. Click here to complete [address] including selection of the Healthcare Decision Maker Relationship (ie \"Primary\")  Summary:  Documented Next of Kin, per patient report  Navya Godinez (niece): [phone] and Adrian Ming (niece): [phone]  Advance Care Planning Documents (Patient Wishes):  None     Assessment:  Patient declined talking about EOL care or preferences at this time.   Interventions:  Patient DECLINED ACP conversation    Care Preferences Communicated:   No    Outcomes/Plan:  ACP Discussion: Refused    Electronically signed by Clementina Rapp [facility] on 11/23/2023 at 4:45 PM

## 2023-11-24 ENCOUNTER — APPOINTMENT (OUTPATIENT)
Facility: HOSPITAL | Age: 73
End: 2023-11-24
Attending: INTERNAL MEDICINE
Payer: MEDICARE

## 2023-11-24 ENCOUNTER — APPOINTMENT (OUTPATIENT)
Facility: HOSPITAL | Age: 73
End: 2023-11-24
Payer: MEDICARE

## 2023-11-24 LAB
ALBUMIN FLD-MCNC: 2.8 G/DL
AMYLASE FLD-CCNC: 136 U/L
ANION GAP SERPL CALC-SCNC: 6 MMOL/L (ref 3–18)
APPEARANCE FLD: ABNORMAL
APTT PPP: 96.6 SEC (ref 23–36.4)
BODY FLD TYPE: NORMAL
BUN SERPL-MCNC: 27 MG/DL (ref 7–18)
BUN/CREAT SERPL: 13 (ref 12–20)
CALCIUM SERPL-MCNC: 8.9 MG/DL (ref 8.5–10.1)
CHLORIDE SERPL-SCNC: 113 MMOL/L (ref 100–111)
CO2 SERPL-SCNC: 20 MMOL/L (ref 21–32)
COLOR FLD: ABNORMAL
CREAT SERPL-MCNC: 2.11 MG/DL (ref 0.6–1.3)
ECHO AO ASC DIAM: 2.6 CM
ECHO AO ASCENDING AORTA INDEX: 1.32 CM/M2
ECHO AO ROOT DIAM: 2.9 CM
ECHO AO ROOT INDEX: 1.47 CM/M2
ECHO AR MAX VEL PISA: 2.8 M/S
ECHO AV AREA PEAK VELOCITY: 2 CM2
ECHO AV AREA VTI: 2.6 CM2
ECHO AV AREA/BSA PEAK VELOCITY: 1 CM2/M2
ECHO AV AREA/BSA VTI: 1.3 CM2/M2
ECHO AV MEAN GRADIENT: 7 MMHG
ECHO AV MEAN VELOCITY: 1.3 M/S
ECHO AV PEAK GRADIENT: 13 MMHG
ECHO AV PEAK VELOCITY: 1.8 M/S
ECHO AV REGURGITANT PHT: 797 MILLISECOND
ECHO AV VELOCITY RATIO: 0.61
ECHO AV VTI: 30.6 CM
ECHO BSA: 1.97 M2
ECHO BSA: 1.97 M2
ECHO EST RA PRESSURE: 10 MMHG
ECHO LA DIAMETER INDEX: 1.73 CM/M2
ECHO LA DIAMETER: 3.4 CM
ECHO LA TO AORTIC ROOT RATIO: 1.17
ECHO LA VOL A-L A4C: 22 ML (ref 18–58)
ECHO LA VOL MOD A4C: 21 ML (ref 18–58)
ECHO LA VOLUME INDEX A-L A4C: 11 ML/M2 (ref 16–34)
ECHO LA VOLUME INDEX MOD A4C: 11 ML/M2 (ref 16–34)
ECHO LV E' LATERAL VELOCITY: 7 CM/S
ECHO LV E' SEPTAL VELOCITY: 7 CM/S
ECHO LV FRACTIONAL SHORTENING: 39 % (ref 28–44)
ECHO LV INTERNAL DIMENSION DIASTOLE INDEX: 1.93 CM/M2
ECHO LV INTERNAL DIMENSION DIASTOLIC: 3.8 CM (ref 4.2–5.9)
ECHO LV INTERNAL DIMENSION SYSTOLIC INDEX: 1.17 CM/M2
ECHO LV INTERNAL DIMENSION SYSTOLIC: 2.3 CM
ECHO LV IVSD: 1.3 CM (ref 0.6–1)
ECHO LV MASS 2D: 163 G (ref 88–224)
ECHO LV MASS INDEX 2D: 82.7 G/M2 (ref 49–115)
ECHO LV POSTERIOR WALL DIASTOLIC: 1.2 CM (ref 0.6–1)
ECHO LV RELATIVE WALL THICKNESS RATIO: 0.63
ECHO LVOT AREA: 3.1 CM2
ECHO LVOT AV VTI INDEX: 0.82
ECHO LVOT DIAM: 2 CM
ECHO LVOT MEAN GRADIENT: 3 MMHG
ECHO LVOT PEAK GRADIENT: 5 MMHG
ECHO LVOT PEAK VELOCITY: 1.1 M/S
ECHO LVOT STROKE VOLUME INDEX: 40.2 ML/M2
ECHO LVOT SV: 79.1 ML
ECHO LVOT VTI: 25.2 CM
ECHO MV A VELOCITY: 0.93 M/S
ECHO MV AREA PHT: 2.3 CM2
ECHO MV AREA VTI: 3.6 CM2
ECHO MV E DECELERATION TIME (DT): 241.3 MS
ECHO MV E VELOCITY: 0.62 M/S
ECHO MV E/A RATIO: 0.67
ECHO MV E/E' LATERAL: 8.86
ECHO MV E/E' RATIO (AVERAGED): 8.86
ECHO MV LVOT VTI INDEX: 0.87
ECHO MV MAX VELOCITY: 0.9 M/S
ECHO MV MEAN GRADIENT: 1 MMHG
ECHO MV MEAN VELOCITY: 0.5 M/S
ECHO MV PEAK GRADIENT: 4 MMHG
ECHO MV PRESSURE HALF TIME (PHT): 95.8 MS
ECHO MV VTI: 22 CM
ECHO PULMONARY ARTERY END DIASTOLIC PRESSURE: 7 MMHG
ECHO PULMONARY ARTERY SYSTOLIC PRESSURE (PASP): 53 MMHG
ECHO PV MAX VELOCITY: 1.2 M/S
ECHO PV PEAK GRADIENT: 5 MMHG
ECHO PV REGURGITANT MAX VELOCITY: 1.3 M/S
ECHO RIGHT VENTRICULAR SYSTOLIC PRESSURE (RVSP): 54 MMHG
ECHO RV BASAL DIMENSION: 4 CM
ECHO RV TAPSE: 1.6 CM (ref 1.7–?)
ECHO RVOT PEAK GRADIENT: 3 MMHG
ECHO RVOT PEAK VELOCITY: 0.9 M/S
ECHO TV REGURGITANT MAX VELOCITY: 3.31 M/S
ECHO TV REGURGITANT PEAK GRADIENT: 44 MMHG
GLUCOSE FLD-MCNC: 139 MG/DL
GLUCOSE SERPL-MCNC: 108 MG/DL (ref 74–99)
LDH FLD L TO P-CCNC: 360 U/L
LDH SERPL L TO P-CCNC: 233 U/L (ref 81–234)
MAGNESIUM SERPL-MCNC: 1.9 MG/DL (ref 1.6–2.6)
NEUTROPHILS NFR FLD: ABNORMAL % (ref 0–25)
NUC CELL # FLD: 614 /CU MM
PH FLD: 7.4
PHOSPHATE SERPL-MCNC: 2.4 MG/DL (ref 2.5–4.9)
POTASSIUM SERPL-SCNC: 4.1 MMOL/L (ref 3.5–5.5)
PROT FLD-MCNC: 4.9 G/DL
PROT SERPL-MCNC: 7 G/DL (ref 6.4–8.2)
RBC # FLD: ABNORMAL /CU MM
SODIUM SERPL-SCNC: 139 MMOL/L (ref 136–145)
SPECIMEN SOURCE FLD: ABNORMAL
SPECIMEN SOURCE FLD: NORMAL

## 2023-11-24 PROCEDURE — 82150 ASSAY OF AMYLASE: CPT

## 2023-11-24 PROCEDURE — 84155 ASSAY OF PROTEIN SERUM: CPT

## 2023-11-24 PROCEDURE — 88305 TISSUE EXAM BY PATHOLOGIST: CPT

## 2023-11-24 PROCEDURE — 82945 GLUCOSE OTHER FLUID: CPT

## 2023-11-24 PROCEDURE — 93970 EXTREMITY STUDY: CPT

## 2023-11-24 PROCEDURE — 83986 ASSAY PH BODY FLUID NOS: CPT

## 2023-11-24 PROCEDURE — 93306 TTE W/DOPPLER COMPLETE: CPT | Performed by: INTERNAL MEDICINE

## 2023-11-24 PROCEDURE — 84157 ASSAY OF PROTEIN OTHER: CPT

## 2023-11-24 PROCEDURE — 80048 BASIC METABOLIC PNL TOTAL CA: CPT

## 2023-11-24 PROCEDURE — 84311 SPECTROPHOTOMETRY: CPT

## 2023-11-24 PROCEDURE — 6370000000 HC RX 637 (ALT 250 FOR IP): Performed by: HOSPITALIST

## 2023-11-24 PROCEDURE — 6370000000 HC RX 637 (ALT 250 FOR IP): Performed by: INTERNAL MEDICINE

## 2023-11-24 PROCEDURE — 83615 LACTATE (LD) (LDH) ENZYME: CPT

## 2023-11-24 PROCEDURE — 93306 TTE W/DOPPLER COMPLETE: CPT

## 2023-11-24 PROCEDURE — 93970 EXTREMITY STUDY: CPT | Performed by: INTERNAL MEDICINE

## 2023-11-24 PROCEDURE — 82042 OTHER SOURCE ALBUMIN QUAN EA: CPT

## 2023-11-24 PROCEDURE — 6360000002 HC RX W HCPCS: Performed by: STUDENT IN AN ORGANIZED HEALTH CARE EDUCATION/TRAINING PROGRAM

## 2023-11-24 PROCEDURE — 2709999900 US THORACENTESIS

## 2023-11-24 PROCEDURE — 83735 ASSAY OF MAGNESIUM: CPT

## 2023-11-24 PROCEDURE — 2580000003 HC RX 258: Performed by: INTERNAL MEDICINE

## 2023-11-24 PROCEDURE — 99232 SBSQ HOSP IP/OBS MODERATE 35: CPT | Performed by: HOSPITALIST

## 2023-11-24 PROCEDURE — 99232 SBSQ HOSP IP/OBS MODERATE 35: CPT | Performed by: INTERNAL MEDICINE

## 2023-11-24 PROCEDURE — 85730 THROMBOPLASTIN TIME PARTIAL: CPT

## 2023-11-24 PROCEDURE — 84100 ASSAY OF PHOSPHORUS: CPT

## 2023-11-24 PROCEDURE — 71045 X-RAY EXAM CHEST 1 VIEW: CPT

## 2023-11-24 PROCEDURE — 87070 CULTURE OTHR SPECIMN AEROBIC: CPT

## 2023-11-24 PROCEDURE — 0W993ZZ DRAINAGE OF RIGHT PLEURAL CAVITY, PERCUTANEOUS APPROACH: ICD-10-PCS | Performed by: RADIOLOGY

## 2023-11-24 PROCEDURE — 36415 COLL VENOUS BLD VENIPUNCTURE: CPT

## 2023-11-24 PROCEDURE — 89051 BODY FLUID CELL COUNT: CPT

## 2023-11-24 PROCEDURE — 87205 SMEAR GRAM STAIN: CPT

## 2023-11-24 PROCEDURE — 88112 CYTOPATH CELL ENHANCE TECH: CPT

## 2023-11-24 PROCEDURE — 1100000003 HC PRIVATE W/ TELEMETRY

## 2023-11-24 RX ADMIN — Medication 3 MG: at 23:00

## 2023-11-24 RX ADMIN — SODIUM CHLORIDE, PRESERVATIVE FREE 10 ML: 5 INJECTION INTRAVENOUS at 10:52

## 2023-11-24 RX ADMIN — SODIUM CHLORIDE, PRESERVATIVE FREE 10 ML: 5 INJECTION INTRAVENOUS at 23:00

## 2023-11-24 RX ADMIN — CARVEDILOL 3.12 MG: 3.12 TABLET, FILM COATED ORAL at 17:34

## 2023-11-24 RX ADMIN — FAMOTIDINE 10 MG: 20 TABLET ORAL at 10:50

## 2023-11-24 RX ADMIN — HEPARIN SODIUM 12.97 UNITS/KG/HR: 10000 INJECTION, SOLUTION INTRAVENOUS at 03:58

## 2023-11-24 NOTE — CONSULTS
Interventional Radiology Consult Note  Patient: Salbador Vega               Sex: male          DOA: 11/22/2023       YOB: 1950      Age:  74 y.o.        LOS:  LOS: 2 days              Assessment   Right pleural mass s/p thoracentesis 11/22 - cytology specimen pending  Right pleural effusion  Multiple pulmonary masses  Acute PE on heparin drip    Right pleural mass biopsy is indicated to aid in diagnostics and guide further management. Case and images reviewed by Dr. Laith Moss. Plan     Image guided right pleural mass biopsy 11/27/23 with moderate sedation as schedule allows    Repeat thoracentesis 11/24/23 due to residual effusion post procedure - specimen orders per pulm    Hold heparin at 0500 11/27 for biopsy    Thank you,  ANGEL Pat  1420    HPI:     Consult for evaluation of patient with imaging concerning for metastatic lung cancer requiring a tissue diagnosis received from Dr. Romayne Bock and reviewed with Dr. Laith Moss. Salbador Vega is a 74 y.o. male with a PMH of CKD, HTN, and gout who presented to [facility] on 11/22/23 for dyspnea. ED evaluation was significant for CT showing a large right pleural effusion, multiple lung lesions, and acute PE. Patient was admitted for further evaluation and management. Thoracentesis 11/22/23 of 1.5 L with residual pleural effusion post.  Cytology specimen is pending. Heparin drip for PE. The patient is no longer requiring supplemental oxygen after heparin and thoracentesis despite residual effusion. Today the patient reports improved dyspnea. Denies chest pain, hemoptysis, weakness, fatigue, HA, dizziness, N/V. The anticipated procedure was discussed in detail including risk of injury, infection, and bleeding. All questions were answered and concerns addressed. Informed consents were obtained.     Past Medical History:   Diagnosis Date    CKD (chronic kidney disease) stage 4, GFR 15-29 ml/min ([facility]) 11/22/2023    Gout     HTN (hypertension)

## 2023-11-24 NOTE — PROGRESS NOTES
[facility]  Pulmonary, Critical Care, and Sleep Medicine    Name: Suha Kaye MRN: [id number]   : 1950 [facility]   Date: 2023        IMPRESSION:   Acute hypoxic respiratory failure -resolved; on RA. Likely multifactorial.  Acute pulmonary embolism - Left lobar and segmental pulmonary artery. No RHS on CT. TTE pending. Troponin and BNP wnl. Right pleural effusion - large; likely malignant effusion. S/p Rt thoracentesis () 1.5 L removed; exudative; cytology pending. Multiple pulmonary masses - CT with lobulated enhancing pleural masses, likely pleural deposits measuring 5.4 x 2.3 cm, 3.6 x 1.9 cm and mediastinal lymphadenopathy. Complete collapse of RLL and partial collapse of RUL noted. Gouty arthritis  HTN     Patient Active Problem List   Diagnosis    Hypoxia    Acute pulmonary thromboembolism (HCC)    Pleural effusion on right    CKD (chronic kidney disease) stage 4, GFR 15-29 ml/min (HCC)    Primary hypertension    Mass of right lung    Pleural effusion      PLAN:   Continue anticoagulation with heparin drip.   Follow-up pleural fluid studies - culture & cytology  Patient requires biopsy of pulmonary masses; IR consulted  Recommend repeat right thoracentesis -to be completed today by IR team; pleural studies ordered  Obtain TTE- r/o RHS -pending  Supplemental oxygen to maintain SpO2 >88%; avoid hyperoxygenation - on RA  Antibiotics -no indication for antibiotic therapy from a pulmonary standpoint  Aspiration precautions including elevating HOB >30deg  Aggressive pulmonary toileting/bronchial hygiene  Frequent incentive spirometry and flutter valve     Assess home Oxygen needs at discharge  PT/OT, OOB, ambulate with assistance as tolerated  PUD prophylaxis per primary service  Your medical management  Out patient testing - PFT, 6 min walk, PET/CT  O/P: Follow-up pulmonary clinic with Dr. Alfredo Ventura     Pulmonary will continue to follow

## 2023-11-24 NOTE — CONSULTS
Comprehensive Nutrition Assessment    Type and Reason for Visit:  Initial, Consult, Positive Nutrition Screen    Nutrition Recommendations/Plan:   Continue current nutrition interventions. Encourage/ monitor po intake of meals      Malnutrition Assessment:  Malnutrition Status: At risk for malnutrition (Comment) (wt loss PTA; decreased appetite PTA per chart review) (11/24/23 1430)    Context:  Acute Illness       Nutrition History and Allergies:   Past medical hx:  CKD stage 4, gout, HTN. Pt reported UBW was 185 lb about 2 years ago; he stated this is due to have still worked and eating junk food. Then after retiring, he was not eating as much junk food and experienced wt loss; with more recent UBW of 170 lb. Per chart hx, pt weighing 178 lb on 7/26/23. Pt was not concerned with the wt loss PTA. Usually eats 2-3 meals daily at home; good appetite. No known food allergies     Nutrition Assessment:    Pt admitted with acute pulmonary embolus. Per H&P, pt reported experiencing fatigue, lack of appetite, and wt loss since his son's passing 1.5 month ago. Pt on po diet. Eating at least 50% of meals. Reported having a good appetite. Discussed adding nutrition supplement; pt declined all options. Meal intake encouraged. Nutrition Related Findings:    BM 11/23. No edema. Pertinent labs and meds reviewed. Wound Type: None       Current Nutrition Intake & Therapies:    Average Meal Intake: 51-75%  Average Supplements Intake: None Ordered, Refusing to take  ADULT DIET; Regular; Low Fat/Low Chol/High Fiber/YORDY  Diet NPO Exceptions are: Sips of Water with Meds    Anthropometric Measures:  Height: 180.3 cm (5' 10.98\")  Ideal Body Weight (IBW): 172 lbs (78 kg)    Admission Body Weight: 77.1 kg (169 lb 15.6 oz)  Current Body Weight: 77.1 kg (169 lb 15.6 oz), 98.8 % IBW.  Weight Source: Not Specified  Current BMI (kg/m2): 23.7  Usual Body Weight: 80.7 kg (178 lb)  % Weight Change (Calculated): -4.5  Weight Adjustment

## 2023-11-24 NOTE — PROGRESS NOTES
0720 -- Bedside and Verbal shift change report given to [facility] (oncoming nurse) by Chela Mike (offgoing nurse). Report included the following information Nurse Handoff Report. 0900 -- AM meds given, assessment completed, patient  O2 sats at 95% on room air, denies any shortness of breath. will leave NC off and monitor. 1200 -- no change in patient condition, no distress noted, going to the [place] room without any difficulties. Denies any shortness of breath. 1630- PM meds given, no distress noted, patient resting in bed with call bell in reach  Bedside and Verbal shift change report given to Ayush Johnson (oncoming nurse) by [facility]  (offgoing nurse). Report included the following information Nurse Handoff Report.

## 2023-11-24 NOTE — PROGRESS NOTES
Collection Time: 11/24/23  9:16 AM   Result Value Ref Range    Body Surface Area 1.97 m2   Echo (TTE) complete (PRN contrast/bubble/strain/3D)    Collection Time: 11/24/23  9:22 AM   Result Value Ref Range    Body Surface Area 1.97 m2    IVSd 1.3 (A) 0.6 - 1.0 cm    LVIDd 3.8 (A) 4.2 - 5.9 cm    LVIDs 2.3 cm    LVOT Diameter 2.0 cm    LVPWd 1.2 (A) 0.6 - 1.0 cm    LVOT Peak Gradient 5 mmHg    LVOT Mean Gradient 3 mmHg    LVOT SV 79.1 ml    LVOT Peak Velocity 1.1 m/s    LVOT VTI 25.2 cm    RV Basal Dimension 4.0 cm    RVSP 54 mmHg    RVOT Peak Gradient 3 mmHg    RVOT Peak Velocity 0.9 m/s    LA Diameter 3.4 cm    LA Volume A-L A4C 22 18 - 58 mL    LA Volume MOD A4C 21 18 - 58 mL    Est. RA Pressure 10 mmHg    AV Area by Peak Velocity 2.0 cm2    AV Area by VTI 2.6 cm2    AR .0 millisecond    AR Max Velocity PISA 2.8 m/s    AV Peak Gradient 13 mmHg    AV Mean Gradient 7 mmHg    AV Peak Velocity 1.8 m/s    AV Mean Velocity 1.3 m/s    AV VTI 30.6 cm    MV A Velocity 0.93 m/s    MV E Wave Deceleration Time 241.3 ms    MV E Velocity 0.62 m/s    LV E' Lateral Velocity 7 cm/s    LV E' Septal Velocity 7 cm/s    MV Area by VTI 3.6 cm2    MV Peak Gradient 4 mmHg    MV Mean Gradient 1 mmHg    MV PHT 95.8 ms    MV Max Velocity 0.9 m/s    MV Mean Velocity 0.5 m/s    MV VTI 22.0 cm    MV Area by PHT 2.3 cm2    Pulmonary Artery EDP 7 mmHg    MT Max Velocity 1.3 m/s    PV Peak Gradient 5 mmHg    PV Max Velocity 1.2 m/s    TAPSE 1.6 (A) 1.7 cm    TR Peak Gradient 44 mmHg    TR Max Velocity 3.31 m/s    Ascending Aorta 2.6 cm    Aortic Root 2.9 cm    Fractional Shortening 2D 39 28 - 44 %    LVIDd Index 1.93 cm/m2    LVIDs Index 1.17 cm/m2    LV RWT Ratio 0.63     LV Mass 2D 163.0 88 - 224 g    LV Mass 2D Index 82.7 49 - 115 g/m2    MV E/A 0.67     E/E' Ratio (Averaged) 8.86     E/E' Lateral 8.86     LVOT Stroke Volume Index 40.2 mL/m2    LVOT Area 3.1 cm2    LA Volume Index A-L A4C 11 (A) 16 - 34 mL/m2    LA Volume Index MOD A4C 11 (A) 16 - 34 ml/m2    LA Size Index 1.73 cm/m2    LA/AO Root Ratio 1.17     Ao Root Index 1.47 cm/m2    Ascending Aorta Index 1.32 cm/m2    AV Velocity Ratio 0.61     LVOT:AV VTI Index 0.82     TRACI/BSA VTI 1.3 cm2/m2    TRACI/BSA Peak Velocity 1.0 cm2/m2    MV:LVOT VTI Index 0.87     PASP 53 mmHg     Additional Data Reviewed:      Signed By: Werner Gillespie MD     November 24, 2023 4:17 PM

## 2023-11-24 NOTE — PROCEDURES
RADIOLOGY POST THORACENTESIS NOTE     November 24, 2023       4:24 PM     :  ANGEL Ballard    Assistant:  None. Attending: Dr. Katja Stokes    Procedure:  US-guided right thoracentesis     Pre-operative Diagnosis: Pleural effusion    Post-operative Diagnosis: same    Procedure Details: Informed consent obtained. Under ultrasound guidance, the largest pocket of fluid collection was identified. Percutaneous access was achieved using a posterior intercostal approach with a one step needle. 1% lidocaine was utilized for local anesthesia. The 5 [place] Yueh sheathed needle connected to manually controlled vacuum bottle. 2 L of dark harika fluid removed. Images saved in PACS. Complications:  No immediate. Specimens: Yes    Estimated blood Loss: Minimal               Condition: Stable. Impression:  Successful right thoracentesis. Plan: Post procedure chest xray pending.

## 2023-11-25 ENCOUNTER — APPOINTMENT (OUTPATIENT)
Facility: HOSPITAL | Age: 73
End: 2023-11-25
Payer: MEDICARE

## 2023-11-25 PROBLEM — I50.32 CHRONIC HEART FAILURE WITH PRESERVED EJECTION FRACTION (HCC): Status: ACTIVE | Noted: 2023-11-25

## 2023-11-25 PROBLEM — J95.811 POSTPROCEDURAL PNEUMOTHORAX: Status: ACTIVE | Noted: 2023-11-25

## 2023-11-25 PROBLEM — R79.89 ELEVATED TROPONIN: Status: ACTIVE | Noted: 2023-11-25

## 2023-11-25 PROBLEM — J96.01 ACUTE HYPOXIC RESPIRATORY FAILURE (HCC): Status: ACTIVE | Noted: 2023-11-25

## 2023-11-25 PROBLEM — I27.20 MODERATE PULMONARY HYPERTENSION (HCC): Status: ACTIVE | Noted: 2023-11-25

## 2023-11-25 LAB
ANION GAP SERPL CALC-SCNC: 7 MMOL/L (ref 3–18)
APTT PPP: 156.4 SEC (ref 23–36.4)
APTT PPP: 65.1 SEC (ref 23–36.4)
BUN SERPL-MCNC: 31 MG/DL (ref 7–18)
BUN/CREAT SERPL: 14 (ref 12–20)
CALCIUM SERPL-MCNC: 8.9 MG/DL (ref 8.5–10.1)
CHLORIDE SERPL-SCNC: 111 MMOL/L (ref 100–111)
CO2 SERPL-SCNC: 20 MMOL/L (ref 21–32)
CREAT SERPL-MCNC: 2.24 MG/DL (ref 0.6–1.3)
ERYTHROCYTE [DISTWIDTH] IN BLOOD BY AUTOMATED COUNT: 12.3 % (ref 11.6–14.5)
GLUCOSE SERPL-MCNC: 106 MG/DL (ref 74–99)
HCT VFR BLD AUTO: 44.9 % (ref 36–48)
HGB BLD-MCNC: 14.1 G/DL (ref 13–16)
MCH RBC QN AUTO: 26.7 PG (ref 24–34)
MCHC RBC AUTO-ENTMCNC: 31.4 G/DL (ref 31–37)
MCV RBC AUTO: 85 FL (ref 78–100)
NRBC # BLD: 0 K/UL (ref 0–0.01)
NRBC BLD-RTO: 0 PER 100 WBC
PLATELET # BLD AUTO: 263 K/UL (ref 135–420)
PMV BLD AUTO: 10.4 FL (ref 9.2–11.8)
POTASSIUM SERPL-SCNC: 3.9 MMOL/L (ref 3.5–5.5)
RBC # BLD AUTO: 5.28 M/UL (ref 4.35–5.65)
SODIUM SERPL-SCNC: 138 MMOL/L (ref 136–145)
WBC # BLD AUTO: 7.5 K/UL (ref 4.6–13.2)

## 2023-11-25 PROCEDURE — 6370000000 HC RX 637 (ALT 250 FOR IP): Performed by: INTERNAL MEDICINE

## 2023-11-25 PROCEDURE — 6360000002 HC RX W HCPCS: Performed by: INTERNAL MEDICINE

## 2023-11-25 PROCEDURE — 85027 COMPLETE CBC AUTOMATED: CPT

## 2023-11-25 PROCEDURE — 2580000003 HC RX 258: Performed by: INTERNAL MEDICINE

## 2023-11-25 PROCEDURE — 94761 N-INVAS EAR/PLS OXIMETRY MLT: CPT

## 2023-11-25 PROCEDURE — 71045 X-RAY EXAM CHEST 1 VIEW: CPT

## 2023-11-25 PROCEDURE — 2580000003 HC RX 258: Performed by: HOSPITALIST

## 2023-11-25 PROCEDURE — 6360000002 HC RX W HCPCS: Performed by: RADIOLOGY

## 2023-11-25 PROCEDURE — 99232 SBSQ HOSP IP/OBS MODERATE 35: CPT | Performed by: INTERNAL MEDICINE

## 2023-11-25 PROCEDURE — 0W9930Z DRAINAGE OF RIGHT PLEURAL CAVITY WITH DRAINAGE DEVICE, PERCUTANEOUS APPROACH: ICD-10-PCS | Performed by: RADIOLOGY

## 2023-11-25 PROCEDURE — 2709999900 CT GUIDED THORACENTESIS

## 2023-11-25 PROCEDURE — 36415 COLL VENOUS BLD VENIPUNCTURE: CPT

## 2023-11-25 PROCEDURE — 6370000000 HC RX 637 (ALT 250 FOR IP): Performed by: HOSPITALIST

## 2023-11-25 PROCEDURE — 2140000001 HC CVICU INTERMEDIATE R&B

## 2023-11-25 PROCEDURE — 6360000002 HC RX W HCPCS: Performed by: STUDENT IN AN ORGANIZED HEALTH CARE EDUCATION/TRAINING PROGRAM

## 2023-11-25 PROCEDURE — 80048 BASIC METABOLIC PNL TOTAL CA: CPT

## 2023-11-25 PROCEDURE — 85730 THROMBOPLASTIN TIME PARTIAL: CPT

## 2023-11-25 PROCEDURE — 99223 1ST HOSP IP/OBS HIGH 75: CPT | Performed by: INTERNAL MEDICINE

## 2023-11-25 PROCEDURE — 99233 SBSQ HOSP IP/OBS HIGH 50: CPT | Performed by: HOSPITALIST

## 2023-11-25 RX ORDER — SODIUM CHLORIDE 9 MG/ML
INJECTION, SOLUTION INTRAVENOUS CONTINUOUS
Status: DISPENSED | OUTPATIENT
Start: 2023-11-25 | End: 2023-11-25

## 2023-11-25 RX ORDER — MIDAZOLAM HYDROCHLORIDE 1 MG/ML
INJECTION INTRAMUSCULAR; INTRAVENOUS PRN
Status: COMPLETED | OUTPATIENT
Start: 2023-11-25 | End: 2023-11-25

## 2023-11-25 RX ORDER — FENTANYL CITRATE 50 UG/ML
INJECTION, SOLUTION INTRAMUSCULAR; INTRAVENOUS PRN
Status: COMPLETED | OUTPATIENT
Start: 2023-11-25 | End: 2023-11-25

## 2023-11-25 RX ORDER — MIDAZOLAM HYDROCHLORIDE 1 MG/ML
INJECTION INTRAMUSCULAR; INTRAVENOUS
Status: DISPENSED
Start: 2023-11-25 | End: 2023-11-26

## 2023-11-25 RX ORDER — CARVEDILOL 3.12 MG/1
3.12 TABLET ORAL 2 TIMES DAILY WITH MEALS
Status: DISCONTINUED | OUTPATIENT
Start: 2023-11-25 | End: 2023-11-26

## 2023-11-25 RX ORDER — FENTANYL CITRATE 50 UG/ML
INJECTION, SOLUTION INTRAMUSCULAR; INTRAVENOUS
Status: DISPENSED
Start: 2023-11-25 | End: 2023-11-26

## 2023-11-25 RX ADMIN — MIDAZOLAM HYDROCHLORIDE 1 MG: 2 INJECTION, SOLUTION INTRAMUSCULAR; INTRAVENOUS at 14:30

## 2023-11-25 RX ADMIN — ONDANSETRON 4 MG: 2 INJECTION INTRAMUSCULAR; INTRAVENOUS at 23:30

## 2023-11-25 RX ADMIN — HEPARIN SODIUM 3080 UNITS: 1000 INJECTION INTRAVENOUS; SUBCUTANEOUS at 04:19

## 2023-11-25 RX ADMIN — Medication 3 MG: at 23:30

## 2023-11-25 RX ADMIN — FAMOTIDINE 10 MG: 20 TABLET ORAL at 09:07

## 2023-11-25 RX ADMIN — SODIUM CHLORIDE: 9 INJECTION, SOLUTION INTRAVENOUS at 12:00

## 2023-11-25 RX ADMIN — SODIUM CHLORIDE, PRESERVATIVE FREE 10 ML: 5 INJECTION INTRAVENOUS at 23:31

## 2023-11-25 RX ADMIN — FENTANYL CITRATE 50 MCG: 50 INJECTION, SOLUTION INTRAMUSCULAR; INTRAVENOUS at 14:20

## 2023-11-25 RX ADMIN — MIDAZOLAM HYDROCHLORIDE 1 MG: 2 INJECTION, SOLUTION INTRAMUSCULAR; INTRAVENOUS at 14:20

## 2023-11-25 RX ADMIN — FENTANYL CITRATE 50 MCG: 50 INJECTION, SOLUTION INTRAMUSCULAR; INTRAVENOUS at 14:30

## 2023-11-25 RX ADMIN — SODIUM CHLORIDE, PRESERVATIVE FREE 10 ML: 5 INJECTION INTRAVENOUS at 12:00

## 2023-11-25 RX ADMIN — SODIUM CHLORIDE: 9 INJECTION, SOLUTION INTRAVENOUS at 16:48

## 2023-11-25 RX ADMIN — CARVEDILOL 3.12 MG: 3.12 TABLET, FILM COATED ORAL at 18:11

## 2023-11-25 ASSESSMENT — PAIN SCALES - GENERAL
PAINLEVEL_OUTOF10: 10
PAINLEVEL_OUTOF10: 0
PAINLEVEL_OUTOF10: 0

## 2023-11-25 ASSESSMENT — PAIN - FUNCTIONAL ASSESSMENT: PAIN_FUNCTIONAL_ASSESSMENT: PREVENTS OR INTERFERES SOME ACTIVE ACTIVITIES AND ADLS

## 2023-11-25 ASSESSMENT — PAIN DESCRIPTION - ORIENTATION: ORIENTATION: RIGHT

## 2023-11-25 ASSESSMENT — PAIN DESCRIPTION - DESCRIPTORS: DESCRIPTORS: ACHING

## 2023-11-25 NOTE — PROGRESS NOTES
RADIOLOGY POST THORACENTESIS NOTE     CXR shows enlarging moderate size right PTX    Chest tube placement today  NPO  Coags  No blood thinners      D/w Dr. Brian Pop

## 2023-11-25 NOTE — PLAN OF CARE
Problem: Discharge Planning  Goal: Discharge to home or other facility with appropriate resources  Outcome: Progressing     Problem: Skin/Tissue Integrity  Goal: Absence of new skin breakdown  Description: 1. Monitor for areas of redness and/or skin breakdown  2. Assess vascular access sites hourly  3. Every 4-6 hours minimum:  Change oxygen saturation probe site  4. Every 4-6 hours:  If on nasal continuous positive airway pressure, respiratory therapy assess nares and determine need for appliance change or resting period.   Outcome: Progressing     Problem: Chronic Conditions and Co-morbidities  Goal: Patient's chronic conditions and co-morbidity symptoms are monitored and maintained or improved  Outcome: Progressing     Problem: Safety - Adult  Goal: Free from fall injury  Outcome: Progressing     Problem: Nutrition Deficit:  Goal: Optimize nutritional status  Outcome: Progressing

## 2023-11-25 NOTE — PROGRESS NOTES
[id number] Hospitalist Group  Progress Note    Patient: Tomy Loo Age: 74 y.o. : 1950 MR#: [id number] SSN: [id number]  Date/Time: 2023    Subjective:     POD3 US-guided right thoracentesis. 1.5 L of clear, dark harika fluid removed. POD2  right thoracentesis, 2 L fluid removed. CXR yesterday evening revealed small right apical ptx. CXR this am revealed increased right pneumothorax. Patient seen and examined at bedside, he states he feels well. He is on nonrebreather. Breathing better since second thoracentesis done, denies any episode of chest pain. States he is hungry. No family at bedside. Assessment/Plan:     1. Acute respiratory failure w/ hypoxia. Supplemental oxygen resumed. 2. Massive right pleural effusion with RLL complete collapse, RML and RUL partial collapse, status post thoracentesis. Status post repeat thoracentesis 2023.  3. RUL 2.6 cm mass. CT with lobulated enhancing pleural masses, likely pleural deposits measuring 5.4 x 2.3 cm, 3.6 x 1.9 cm and mediastinal lymphadenopathy. Follow culture and cytology from pleural fluid studies. Outpatient follow-up with Dr. Pricilla Barboza. Oncology consult Dr. Cleo Dietrich, she will see Monday. 4.  Pneumothorax postprocedure, with interval increase. Discussed with IR regarding chest tube. Patient n.p.o. since midnight. Heparin drip held. Resume heparin drip per IR. 5. Acute PE, without cor pulmonale. On heparin gtt (held), transition to [address] [place] [address] when no further procedures planned per all subspecialties. duplex LE noted. Echo noted. 6. Unintentional weight loss, decreased appetite  7. Hypertension, with relative hypotension. Hold Norvasc, clonidine. Resume Coreg this evening  8. CKD 4  9. Age indeterminate non-occlusive deep vein thrombosis in the right popliteal vein. 10..  Full code. Transfer stepdown. I discussed the case with Dr. Pricilla Barboza, Dr. Gage Stephens, and Dr. Kika Montoya.       Additional Notes: Additional Data Reviewed:      Signed By: Yohan Choudhury MD     November 25, 2023 8:46 AM

## 2023-11-25 NOTE — PROGRESS NOTES
0730- Bedside and Verbal shift change report given to [facility] (oncoming nurse) by Osmar Sarabia (offgoing nurse). Report included the following information Nurse Handoff Report. 0800-patient taken for test. Assessment completed,     1050-- AM meds given,   1220 -- no change in patient condition, no distress noted, heparin stopped per order. 1700-results called to me by Radiologist of a new pneumothorax developed in patient. Results  informed to Dr. Edgar Schmitz and Dr. Baylee Pena. 1730- PM meds given, no distress noted, patient resting in bed with call bell in reach. Restarted heparin as ordered at previous rate. 1930-Bedside and Verbal shift change report given to Mynor Sena (oncoming nurse) by [facility]  (offgoing nurse). Report included the following information Nurse Handoff Report.

## 2023-11-25 NOTE — PROGRESS NOTES
0715- Bedside and Verbal shift change report given to [facility] (oncoming nurse) by Giselle Cedeño (offgoing nurse). Report included the following information Nurse Handoff Report. 0836-Assessment completed, call bell within reach, no distress noted. 0900- AM meds given. 1110-stopped Heparin per Aptt of 156.4  1145-taken to CT via bed. 1430-report called to Brenda at [facility]. Verbal via phone, shift change report given to Brenda (oncoming nurse) by [facility] (offgoing nurse).  Report included the following information Nurse Handoff Report and Cardiac Rhythm SR .

## 2023-11-25 NOTE — PROGRESS NOTES
Preprocedure Assessment      Today 11/25/2023     Indication/Symptoms:   Raissa Roach is a 74 y. o. Male with enlarging right moderate size symptomatic PTX. The H & P and/or progress notes and any available imaging were reviewed. The risks, indications and possible alternatives to the procedure, including doing nothing, were discussed and informed consent was obtained. Physical Exam:      Heart:   RRR   Lungs:   Decreased breath sounds right. The patient is an appropriate candidate to undergo the planned procedure and sedation.     Danny Kelley MD

## 2023-11-25 NOTE — PROGRESS NOTES
TRANSFER - OUT REPORT:    Verbal report given to [address] on [facility]  being transferred to  for routine post-op       Report consisted of patient's Situation, Background, Assessment and   Recommendations(SBAR). Information from the following report(s) Nurse Handoff Report was reviewed with the receiving nurse. Lines:   Peripheral IV 11/22/23 Distal;Right; Anterior Cephalic (Active)   Site Assessment Clean, dry & intact 11/24/23 1700   Line Status Capped 11/24/23 1700   Line Care Connections checked and tightened 11/24/23 1700   Phlebitis Assessment No symptoms 11/24/23 1700   Infiltration Assessment 0 11/24/23 1700   Alcohol Cap Used No 11/24/23 1700   Dressing Status Clean, dry & intact 11/24/23 1700   Dressing Type Transparent 11/24/23 1700       Peripheral IV 11/22/23 Left;Proximal;Anterior Forearm (Active)   Site Assessment Clean, dry & intact 11/24/23 1700   Line Status Infusing 11/24/23 [address] Connections checked and tightened 11/24/23 1700   Phlebitis Assessment Erythema at access site with or without pain 11/24/23 1700   Infiltration Assessment 0 11/24/23 1700   Alcohol Cap Used Yes 11/24/23 1700   Dressing Status Clean, dry & intact 11/24/23 1700   Dressing Type Transparent 11/24/23 1700        Opportunity for questions and clarification was provided.       Patient transported with:  Registered Nurse and Bestowed

## 2023-11-25 NOTE — PROCEDURES
RADIOLOGY POST PROCEDURE NOTE     November 25, 2023       3:00 PM     Preoperative Diagnosis:   Hydropneumothorax. Postoperative Diagnosis:  Same. :  Dr. Joselito Escobar    Assistant:  None. Type of Anesthesia: Percent lidocaine and IV moderate sedation with Versed and fentanyl. Procedure/Description: Image guided right chest tube placement. Findings:   Complex right hydropneumothorax. Estimated blood Loss: Minimal    Specimen Removed: No    Blood transfusions:  None. Implants: 25 [place] Thal-Quick right chest catheter wall suction.     Complications: None    Condition: Stable    Discharge Plan: Continue present therapy    Rocio Tse MD

## 2023-11-25 NOTE — PROGRESS NOTES
1600: Pt arrived to unit via stretcher. Pt on a nonrebreather mask @15lpm. Pt oriented to room, call bell within reach. Bed in lowest, locked position. 1628: Pt transitioned to RA. O2 sats are 100% at this time. No SOB of noted at this time. Patient Brayden Sanchez has been informed that [facility] is not responsible for patient belongings per policy and the signed [facility] Patient Agreement document. Personal items should be sent home or checked in with security. Patient Brayden Sanchez selected the following action:                            []  Send personal items home with a family member or friend                                                 []  Check in personal items with security, excluding clothing                            [x]  Maintain personal items at the bedside, against recommendation                                 by [address] [place] [address]                                   ** If patient Marian Jacobson chooses to maintain personal items at the bedside,                                      Complete the patient belongings inventory in the EMR. 4 Eyes Skin Assessment     NAME:  Andreas Wong OF BIRTH:  1950  MEDICAL RECORD NUMBER:  [id number]    The patient is being assessed for  Admission    I agree that at least one RN has performed a thorough Head to Toe Skin Assessment on the patient. ALL assessment sites listed below have been assessed. Areas assessed by both nurses:    Head, Face, Ears, Shoulders, Back, Chest, Arms, Elbows, Hands, Sacrum. Buttock, Coccyx, Ischium, Legs. Feet and Heels, and Under Medical Devices         Does the Patient have a Wound?  No noted wound(s)       Mat Prevention initiated by RN: Yes  Wound Care Orders initiated by RN: No    Pressure Injury (Stage 3,4, Unstageable, DTI, NWPT, and Complex wounds) if present, place Wound referral order by RN under : No    New Ostomies, if present place, Ostomy referral order under : No     Nurse 1 eSignature: Electronically signed by Nedra Geronimo RN on 11/25/23 at 5:06 PM EST    **SHARE this note so that the co-signing nurse can place an eSignature**    Nurse 2 eSignature: Electronically signed by Mervin Eaton RN on 11/25/23 at 5:06 PM EST     1900: Bedside and Verbal shift change report given to SHONA Madrid (oncoming nurse) by Andrew Villela RN (offgoing nurse). Report included the following information Nurse Handoff Report, Intake/Output, MAR, Cardiac Rhythm NSR, and Quality Measures. Negative

## 2023-11-26 ENCOUNTER — APPOINTMENT (OUTPATIENT)
Facility: HOSPITAL | Age: 73
End: 2023-11-26
Payer: MEDICARE

## 2023-11-26 LAB
ANION GAP SERPL CALC-SCNC: 7 MMOL/L (ref 3–18)
BACTERIA SPEC CULT: NORMAL
BUN SERPL-MCNC: 26 MG/DL (ref 7–18)
BUN/CREAT SERPL: 13 (ref 12–20)
CALCIUM SERPL-MCNC: 9 MG/DL (ref 8.5–10.1)
CHLORIDE SERPL-SCNC: 113 MMOL/L (ref 100–111)
CO2 SERPL-SCNC: 20 MMOL/L (ref 21–32)
CREAT SERPL-MCNC: 2.05 MG/DL (ref 0.6–1.3)
ERYTHROCYTE [DISTWIDTH] IN BLOOD BY AUTOMATED COUNT: 12.4 % (ref 11.6–14.5)
GLUCOSE SERPL-MCNC: 148 MG/DL (ref 74–99)
GRAM STN SPEC: NORMAL
GRAM STN SPEC: NORMAL
HCT VFR BLD AUTO: 48.7 % (ref 36–48)
HGB BLD-MCNC: 15.2 G/DL (ref 13–16)
MCH RBC QN AUTO: 26.6 PG (ref 24–34)
MCHC RBC AUTO-ENTMCNC: 31.2 G/DL (ref 31–37)
MCV RBC AUTO: 85.3 FL (ref 78–100)
NRBC # BLD: 0 K/UL (ref 0–0.01)
NRBC BLD-RTO: 0 PER 100 WBC
PLATELET # BLD AUTO: 288 K/UL (ref 135–420)
PMV BLD AUTO: 10.2 FL (ref 9.2–11.8)
POTASSIUM SERPL-SCNC: 4.2 MMOL/L (ref 3.5–5.5)
RBC # BLD AUTO: 5.71 M/UL (ref 4.35–5.65)
SERVICE CMNT-IMP: NORMAL
SODIUM SERPL-SCNC: 140 MMOL/L (ref 136–145)
WBC # BLD AUTO: 9.1 K/UL (ref 4.6–13.2)

## 2023-11-26 PROCEDURE — 99232 SBSQ HOSP IP/OBS MODERATE 35: CPT | Performed by: INTERNAL MEDICINE

## 2023-11-26 PROCEDURE — 2140000001 HC CVICU INTERMEDIATE R&B

## 2023-11-26 PROCEDURE — 6370000000 HC RX 637 (ALT 250 FOR IP): Performed by: INTERNAL MEDICINE

## 2023-11-26 PROCEDURE — 71045 X-RAY EXAM CHEST 1 VIEW: CPT

## 2023-11-26 PROCEDURE — 85027 COMPLETE CBC AUTOMATED: CPT

## 2023-11-26 PROCEDURE — 2580000003 HC RX 258: Performed by: INTERNAL MEDICINE

## 2023-11-26 PROCEDURE — 36415 COLL VENOUS BLD VENIPUNCTURE: CPT

## 2023-11-26 PROCEDURE — 80048 BASIC METABOLIC PNL TOTAL CA: CPT

## 2023-11-26 PROCEDURE — 94761 N-INVAS EAR/PLS OXIMETRY MLT: CPT

## 2023-11-26 PROCEDURE — 6370000000 HC RX 637 (ALT 250 FOR IP): Performed by: HOSPITALIST

## 2023-11-26 PROCEDURE — 99232 SBSQ HOSP IP/OBS MODERATE 35: CPT | Performed by: HOSPITALIST

## 2023-11-26 RX ORDER — AMLODIPINE BESYLATE 5 MG/1
5 TABLET ORAL DAILY
Status: DISCONTINUED | OUTPATIENT
Start: 2023-11-26 | End: 2023-12-01 | Stop reason: HOSPADM

## 2023-11-26 RX ORDER — CARVEDILOL 6.25 MG/1
6.25 TABLET ORAL 2 TIMES DAILY WITH MEALS
Status: DISCONTINUED | OUTPATIENT
Start: 2023-11-26 | End: 2023-12-01 | Stop reason: HOSPADM

## 2023-11-26 RX ADMIN — SODIUM CHLORIDE, PRESERVATIVE FREE 10 ML: 5 INJECTION INTRAVENOUS at 20:57

## 2023-11-26 RX ADMIN — Medication 3 MG: at 20:53

## 2023-11-26 RX ADMIN — AMLODIPINE BESYLATE 5 MG: 5 TABLET ORAL at 20:53

## 2023-11-26 RX ADMIN — FAMOTIDINE 10 MG: 20 TABLET ORAL at 08:42

## 2023-11-26 RX ADMIN — CARVEDILOL 3.12 MG: 3.12 TABLET, FILM COATED ORAL at 08:42

## 2023-11-26 RX ADMIN — SODIUM CHLORIDE, PRESERVATIVE FREE 10 ML: 5 INJECTION INTRAVENOUS at 08:44

## 2023-11-26 RX ADMIN — CARVEDILOL 6.25 MG: 6.25 TABLET, FILM COATED ORAL at 17:38

## 2023-11-26 ASSESSMENT — PAIN SCALES - GENERAL
PAINLEVEL_OUTOF10: 0

## 2023-11-26 NOTE — PLAN OF CARE
Problem: Discharge Planning  Goal: Discharge to home or other facility with appropriate resources  11/26/2023 0006 by High, Landon Brittle, RN  Outcome: Progressing  Flowsheets (Taken 11/25/2023 1945)  Discharge to home or other facility with appropriate resources:   Identify barriers to discharge with patient and caregiver   Arrange for needed discharge resources and transportation as appropriate   Identify discharge learning needs (meds, wound care, etc)   Refer to discharge planning if patient needs post-hospital services based on physician order or complex needs related to functional status, cognitive ability or social support system  11/25/2023 1637 by Ariana Mathews RN  Outcome: Progressing  Flowsheets (Taken 11/25/2023 1600)  Discharge to home or other facility with appropriate resources:   Identify barriers to discharge with patient and caregiver   Arrange for needed discharge resources and transportation as appropriate   Identify discharge learning needs (meds, wound care, etc)     Problem: Skin/Tissue Integrity  Goal: Absence of new skin breakdown  Description: 1. Monitor for areas of redness and/or skin breakdown  2. Assess vascular access sites hourly  3. Every 4-6 hours minimum:  Change oxygen saturation probe site  4. Every 4-6 hours:  If on nasal continuous positive airway pressure, respiratory therapy assess nares and determine need for appliance change or resting period.   11/26/2023 0006 by Chuy Braun RN  Outcome: Progressing  11/25/2023 1637 by Ariana Mathews RN  Outcome: Progressing     Problem: Chronic Conditions and Co-morbidities  Goal: Patient's chronic conditions and co-morbidity symptoms are monitored and maintained or improved  11/26/2023 0006 by Summer Baldwin RN  Outcome: Progressing  Flowsheets (Taken 11/25/2023 1945)  Care Plan - Patient's Chronic Conditions and Co-Morbidity Symptoms are Monitored and Maintained or Improved:   Monitor and assess patient's chronic conditions and comorbid symptoms for stability, deterioration, or improvement   Collaborate with multidisciplinary team to address chronic and comorbid conditions and prevent exacerbation or deterioration   Update acute care plan with appropriate goals if chronic or comorbid symptoms are exacerbated and prevent overall improvement and discharge  11/25/2023 1637 by Annette Staples RN  Outcome: Progressing  Flowsheets (Taken 11/25/2023 1600)  Care Plan - Patient's Chronic Conditions and Co-Morbidity Symptoms are Monitored and Maintained or Improved:   Monitor and assess patient's chronic conditions and comorbid symptoms for stability, deterioration, or improvement   Collaborate with multidisciplinary team to address chronic and comorbid conditions and prevent exacerbation or deterioration   Update acute care plan with appropriate goals if chronic or comorbid symptoms are exacerbated and prevent overall improvement and discharge     Problem: Safety - Adult  Goal: Free from fall injury  11/26/2023 0006 by Anthony Mendoza RN  Outcome: Progressing  11/25/2023 1637 by Annette Staples RN  Outcome: Progressing     Problem: Nutrition Deficit:  Goal: Optimize nutritional status  11/26/2023 0006 by Anthony Mendoza RN  Outcome: Progressing  11/25/2023 1637 by Annette Staples RN  Outcome: Progressing     Problem: Pain  Goal: Verbalizes/displays adequate comfort level or baseline comfort level  Outcome: Progressing

## 2023-11-26 NOTE — PROGRESS NOTES
Pt's heart rate was 150-160 on the tele monitor. Rounded on the patient and he was standing to urinate. Educated the patient on using the call bell for assistance when urinating, standing, or ambulating. Patient stated that he's not going to call or wait on anyone to tell him when he can urinate. Prior to exiting the pt's room, education was repeated.

## 2023-11-26 NOTE — PROGRESS NOTES
[facility]  Pulmonary, Critical Care, and Sleep Medicine    Name: Shanna Abreu MRN: [id number]   : 1950 [facility]   Date: 2023        IMPRESSION:   Acute hypoxic respiratory failure - resolved; on RA. Likely multifactorial.  Acute pulmonary embolism - Left lobar and segmental pulmonary artery. No RHS on CT. TTE pending. Troponin and BNP wnl. Pericardial effusion, moderate. Without tamponade as noted on TTE. Right pleural effusion - large; likely malignant effusion. S/p Rt thoracentesis () 1.5 L removed; exudative; cytology pending. S/p Rpt Rt thora () w/ 2L of dark harika fluid removed; exudative - cytology and cultures ordered. Right Pneumothorax: enlarging Right PTX after repeat thoracentesis (). Likely due to trapped lung. S/p Rt CT placement - o/p 500 serosanguionous drainage. Multiple pulmonary masses - CT with lobulated enhancing pleural masses, likely pleural deposits measuring 5.4 x 2.3 cm, 3.6 x 1.9 cm and mediastinal lymphadenopathy. Complete collapse of RLL and partial collapse of RUL noted. Grade I diastolic dysfunction. Gouty arthritis: Uric acid elevated. HTN     Patient Active Problem List   Diagnosis    Hypoxia    Acute pulmonary thromboembolism (HCC)    Pleural effusion on right    CKD (chronic kidney disease) stage 4, GFR 15-29 ml/min (HCC)    Hypertension    Mass of right lung    Pleural effusion    Acute hypoxic respiratory failure (HCC)    Chronic heart failure with preserved ejection fraction (HCC)    Moderate pulmonary hypertension (HCC)    Elevated troponin    Postprocedural pneumothorax      PLAN:   CXR personally reviewed; persistent right PTX with Right CT in place. +Air leak at bedside. Right CT found to be on water seal; discussed with nursing - will change to -20 cm wall suction. CT surgery also consulted given probable trapped lung.   Management of moderate pericardial effusion per Cardiology  [facility] with tachypnea or accessory muscle use. Rt CT in place. Heart: Regular rate and rhythm or without murmur or extra heart sounds   Abdomen: abdomen is soft without significant tenderness, masses, organomegaly or guarding   Extremity: +Tophacous gout   Neuro: alert, moving all extremities        DATA:  Labs:  Recent Labs     11/25/23  0035 11/26/23 0135   WBC 7.5 9.1   HGB 14.1 15.2   HCT 44.9 48.7*    288     Recent Labs     11/24/23  0615 11/25/23 0035 11/26/23 0135    138 140   K 4.1 3.9 4.2   * 111 113*   CO2 20* 20* 20*   BUN 27* 31* 26*   MG 1.9  --   --    PHOS 2.4*  --   --      No results for input(s): \"PH\", \"PCO2\", \"PO2\", \"HCO3\", \"FIO2\" in the last 72 hours. PFT: n/a                                                                                              Echo: 11/22/23    Left Ventricle: Normal left ventricular systolic function with a visually estimated EF of 60 - 65%. Left ventricle size is normal. Mildly increased wall thickness. Normal wall motion. Abnormal diastolic function. Grade I Diastolic Dysfunction    Aortic Valve: Trileaflet valve. Mildly calcified cusp. Mild annular calcification. Mild regurgitation. Tricuspid Valve: Moderate regurgitation. Pulmonary Arteries: Moderate pulmonary hypertension present. Pericardium: Moderate (1-2 cm) pericardial effusion present. No indication of cardiac tamponade though has some right atrial dyskinesis and partial collapse Pleural effusion visualized in the subcostal view. CXR (11/25/23): INDICATIONS: Chest tube. FINDINGS:      Portable single view chest demonstrates:     Lungs: The left lung is clear. On the right there are lobular densities  projecting in the right mid chest and volume loss in the base. .      Cardiac Silhouette And Mediastinal Contours: There is some residual shift of  cardiomediastinal structures to the right. Otherwise unremarkable. Pleural Spaces: A right-sided chest tube is in place.  There is

## 2023-11-26 NOTE — PROGRESS NOTES
[facility] Hospitalist Group  Progress Note    Patient: Trey Lucas Age: 74 y.o. : 1950 MR#: [id number] SSN: [id number]  Date/Time: 2023    Subjective:     POD4 US-guided right thoracentesis. 1.5 L of clear, dark harika fluid removed. POD3  right thoracentesis, 2 L fluid removed. POD1 chest tube placement. Complex right hydropneumothorax noted. Heparin gtt resumed 2 hours post procedure. Patient seen and examined at bedside, he states his breathing is fine. Tolerating chest tube. No episode of chest pain. Ate a good breakfast.  Denies constipation. No family at bedside, he states they will visit this afternoon. Assessment/Plan:     1. Acute respiratory failure w/ hypoxia. Supplemental oxygen resumed. 2. Massive right pleural effusion with RLL complete collapse, RML and RUL partial collapse, status post thoracentesis. Status post repeat thoracentesis 2023.  3. RUL 2.6 cm mass. CT with lobulated enhancing pleural masses, likely pleural deposits measuring 5.4 x 2.3 cm, 3.6 x 1.9 cm and mediastinal lymphadenopathy. Follow culture and cytology from pleural fluid studies. Outpatient follow-up with Dr. Cheyenne Pop. Oncology consult Dr. Eugene Peralta, she will see Monday. 4.  Complex right hydropneumothorax s/p chest tube 23. 5. Acute PE, without cor pulmonale. On heparin gtt, transition to NOAC when no further procedures planned per all subspecialties. duplex LE noted. Echo noted. 6. Unintentional weight loss, decreased appetite  7. Hypertension, with relative hypotension. Hold Norvasc, clonidine. On home med Coreg with holding parameters. 8. CKD 4  9. Age indeterminate non-occlusive deep vein thrombosis in the right popliteal vein. 10.. Pericardial effusion moderate without tamponade RA dyskinesis partial collapse though without lin tamponade. Cardiology follows. 11. Moderate pulmonary hypertension  12. Full code.   Continue stepdown

## 2023-11-26 NOTE — PROGRESS NOTES
0700: Bedside and Verbal shift change report given to Swapna Cole RN (oncoming nurse) by Cyn Antunez RN (offgoing nurse). Report included the following information Nurse Handoff Report, Intake/Output, MAR, Cardiac Rhythm NSR, and Quality Measures. 1438: Chest tube placed to wall suction at 100 on wall suction. Per order range is  . -20 suction on the chest tube system per order. 1900: Bedside and Verbal shift change report given to Cyn Antunez RN (oncoming nurse) by Swapna Cole RN (offgoing nurse). Report included the following information Nurse Handoff Report, Intake/Output, MAR, Cardiac Rhythm NSR, and Quality Measures.

## 2023-11-26 NOTE — PROGRESS NOTES
1930hrs:  Bedside and Verbal shift change report given to [address] Ida, RN (oncoming nurse) by Soco Purvis RN (offgoing nurse). Report included the following information Nurse Handoff Report, Index, ED Encounter Summary, Adult Overview, Surgery Report, Intake/Output, MAR, Recent Results, and Cardiac Rhythm NSR . Pt in bed, resting quietly and watching TV. No s/s distress noted. 0730hrs:  Bedside and Verbal shift change report given to Soco Purvis RN (oncoming nurse) by Cordie Buerger, RN (offgoing nurse). Report included the following information Nurse Handoff Report, Index, ED Encounter Summary, Adult Overview, Surgery Report, Intake/Output, MAR, Recent Results, and Cardiac Rhythm NSR .

## 2023-11-27 ENCOUNTER — APPOINTMENT (OUTPATIENT)
Facility: HOSPITAL | Age: 73
End: 2023-11-27
Payer: MEDICARE

## 2023-11-27 LAB
ANION GAP SERPL CALC-SCNC: 4 MMOL/L (ref 3–18)
APTT PPP: 26 SEC (ref 23–36.4)
BUN SERPL-MCNC: 23 MG/DL (ref 7–18)
BUN/CREAT SERPL: 13 (ref 12–20)
CALCIUM SERPL-MCNC: 9.4 MG/DL (ref 8.5–10.1)
CHLORIDE SERPL-SCNC: 112 MMOL/L (ref 100–111)
CO2 SERPL-SCNC: 23 MMOL/L (ref 21–32)
CREAT SERPL-MCNC: 1.77 MG/DL (ref 0.6–1.3)
EKG ATRIAL RATE: 208 BPM
EKG DIAGNOSIS: NORMAL
EKG Q-T INTERVAL: 346 MS
EKG QRS DURATION: 72 MS
EKG QTC CALCULATION (BAZETT): 439 MS
EKG R AXIS: 35 DEGREES
EKG T AXIS: 50 DEGREES
EKG VENTRICULAR RATE: 97 BPM
ERYTHROCYTE [DISTWIDTH] IN BLOOD BY AUTOMATED COUNT: 12.3 % (ref 11.6–14.5)
GLUCOSE SERPL-MCNC: 116 MG/DL (ref 74–99)
HCT VFR BLD AUTO: 49.2 % (ref 36–48)
HGB BLD-MCNC: 15.7 G/DL (ref 13–16)
MCH RBC QN AUTO: 27 PG (ref 24–34)
MCHC RBC AUTO-ENTMCNC: 31.9 G/DL (ref 31–37)
MCV RBC AUTO: 84.7 FL (ref 78–100)
NRBC # BLD: 0 K/UL (ref 0–0.01)
NRBC BLD-RTO: 0 PER 100 WBC
PLATELET # BLD AUTO: 301 K/UL (ref 135–420)
PMV BLD AUTO: 10.1 FL (ref 9.2–11.8)
POTASSIUM SERPL-SCNC: 4.4 MMOL/L (ref 3.5–5.5)
RBC # BLD AUTO: 5.81 M/UL (ref 4.35–5.65)
SODIUM SERPL-SCNC: 139 MMOL/L (ref 136–145)
WBC # BLD AUTO: 12.3 K/UL (ref 4.6–13.2)

## 2023-11-27 PROCEDURE — 6370000000 HC RX 637 (ALT 250 FOR IP): Performed by: INTERNAL MEDICINE

## 2023-11-27 PROCEDURE — 88334 PATH CONSLTJ SURG CYTO XM EA: CPT

## 2023-11-27 PROCEDURE — 93005 ELECTROCARDIOGRAM TRACING: CPT | Performed by: PHYSICIAN ASSISTANT

## 2023-11-27 PROCEDURE — 6360000002 HC RX W HCPCS: Performed by: STUDENT IN AN ORGANIZED HEALTH CARE EDUCATION/TRAINING PROGRAM

## 2023-11-27 PROCEDURE — 36415 COLL VENOUS BLD VENIPUNCTURE: CPT

## 2023-11-27 PROCEDURE — 99233 SBSQ HOSP IP/OBS HIGH 50: CPT | Performed by: INTERNAL MEDICINE

## 2023-11-27 PROCEDURE — 88305 TISSUE EXAM BY PATHOLOGIST: CPT

## 2023-11-27 PROCEDURE — 71045 X-RAY EXAM CHEST 1 VIEW: CPT

## 2023-11-27 PROCEDURE — 0BBN3ZX EXCISION OF RIGHT PLEURA, PERCUTANEOUS APPROACH, DIAGNOSTIC: ICD-10-PCS | Performed by: STUDENT IN AN ORGANIZED HEALTH CARE EDUCATION/TRAINING PROGRAM

## 2023-11-27 PROCEDURE — 2580000003 HC RX 258: Performed by: INTERNAL MEDICINE

## 2023-11-27 PROCEDURE — APPSS15 APP SPLIT SHARED TIME 0-15 MINUTES

## 2023-11-27 PROCEDURE — 2500000003 HC RX 250 WO HCPCS: Performed by: STUDENT IN AN ORGANIZED HEALTH CARE EDUCATION/TRAINING PROGRAM

## 2023-11-27 PROCEDURE — 99232 SBSQ HOSP IP/OBS MODERATE 35: CPT | Performed by: INTERNAL MEDICINE

## 2023-11-27 PROCEDURE — 99152 MOD SED SAME PHYS/QHP 5/>YRS: CPT

## 2023-11-27 PROCEDURE — 2140000001 HC CVICU INTERMEDIATE R&B

## 2023-11-27 PROCEDURE — 80048 BASIC METABOLIC PNL TOTAL CA: CPT

## 2023-11-27 PROCEDURE — 88333 PATH CONSLTJ SURG CYTO XM 1: CPT

## 2023-11-27 PROCEDURE — 85730 THROMBOPLASTIN TIME PARTIAL: CPT

## 2023-11-27 PROCEDURE — 94761 N-INVAS EAR/PLS OXIMETRY MLT: CPT

## 2023-11-27 PROCEDURE — 93010 ELECTROCARDIOGRAM REPORT: CPT | Performed by: INTERNAL MEDICINE

## 2023-11-27 PROCEDURE — 85027 COMPLETE CBC AUTOMATED: CPT

## 2023-11-27 PROCEDURE — 6370000000 HC RX 637 (ALT 250 FOR IP): Performed by: HOSPITALIST

## 2023-11-27 RX ORDER — LIDOCAINE HYDROCHLORIDE 10 MG/ML
INJECTION, SOLUTION EPIDURAL; INFILTRATION; INTRACAUDAL; PERINEURAL PRN
Status: COMPLETED | OUTPATIENT
Start: 2023-11-27 | End: 2023-11-27

## 2023-11-27 RX ORDER — MIDAZOLAM HYDROCHLORIDE 2 MG/2ML
INJECTION, SOLUTION INTRAMUSCULAR; INTRAVENOUS PRN
Status: COMPLETED | OUTPATIENT
Start: 2023-11-27 | End: 2023-11-27

## 2023-11-27 RX ORDER — FENTANYL CITRATE 50 UG/ML
INJECTION, SOLUTION INTRAMUSCULAR; INTRAVENOUS PRN
Status: COMPLETED | OUTPATIENT
Start: 2023-11-27 | End: 2023-11-27

## 2023-11-27 RX ORDER — FENTANYL CITRATE 50 UG/ML
INJECTION, SOLUTION INTRAMUSCULAR; INTRAVENOUS
Status: DISPENSED
Start: 2023-11-27 | End: 2023-11-28

## 2023-11-27 RX ORDER — MIDAZOLAM HYDROCHLORIDE 1 MG/ML
INJECTION INTRAMUSCULAR; INTRAVENOUS
Status: DISPENSED
Start: 2023-11-27 | End: 2023-11-28

## 2023-11-27 RX ADMIN — CARVEDILOL 6.25 MG: 6.25 TABLET, FILM COATED ORAL at 17:01

## 2023-11-27 RX ADMIN — Medication 3 MG: at 20:33

## 2023-11-27 RX ADMIN — FAMOTIDINE 10 MG: 20 TABLET ORAL at 10:19

## 2023-11-27 RX ADMIN — CARVEDILOL 6.25 MG: 6.25 TABLET, FILM COATED ORAL at 10:19

## 2023-11-27 RX ADMIN — AMLODIPINE BESYLATE 5 MG: 5 TABLET ORAL at 10:19

## 2023-11-27 RX ADMIN — SODIUM CHLORIDE, PRESERVATIVE FREE 10 ML: 5 INJECTION INTRAVENOUS at 20:33

## 2023-11-27 RX ADMIN — MIDAZOLAM HYDROCHLORIDE 1 MG: 1 INJECTION, SOLUTION INTRAMUSCULAR; INTRAVENOUS at 15:03

## 2023-11-27 RX ADMIN — SODIUM CHLORIDE, PRESERVATIVE FREE 10 ML: 5 INJECTION INTRAVENOUS at 10:21

## 2023-11-27 RX ADMIN — LIDOCAINE HYDROCHLORIDE 10 ML: 10 INJECTION, SOLUTION EPIDURAL; INFILTRATION; INTRACAUDAL; PERINEURAL at 15:10

## 2023-11-27 RX ADMIN — FENTANYL CITRATE 50 MCG: 50 INJECTION INTRAMUSCULAR; INTRAVENOUS at 15:03

## 2023-11-27 RX ADMIN — HEPARIN SODIUM 18 UNITS/KG/HR: 10000 INJECTION, SOLUTION INTRAVENOUS at 17:54

## 2023-11-27 ASSESSMENT — PAIN SCALES - GENERAL: PAINLEVEL_OUTOF10: 0

## 2023-11-27 NOTE — CARE COORDINATION
Case Management Assessment  Initial Evaluation    Date/Time of Evaluation: 11/27/2023 11:41 AM  Assessment Completed by: CONRAD Day    If patient is discharged prior to next notation, then this note serves as note for discharge by case management. Patient Name: Rianna Miranda                   YOB: 1950  Diagnosis: Hx of pulmonary embolus [Z86.711]  Acute pulmonary thromboembolism ([address]) [I26.99]                   Date / Time: 11/22/2023  8:44 AM    Patient Admission Status: Inpatient   Readmission Risk (Low < 19, Mod (19-27), High > 27): Readmission Risk Score: 13.5    Current PCP: No primary care provider on file. PCP verified by CM? (P) Yes (Pt attends [facility])    Chart Reviewed: Yes      History Provided by: (P) Patient, Medical Record  Patient Orientation: (P) Alert and Oriented, Person, Place, Situation, Self    Patient Cognition: (P) Alert    Hospitalization in the last 30 days (Readmission):  No    If yes, Readmission Assessment in  Navigator will be completed.     Advance Directives:      Code Status: Full Code   Patient's Primary Decision Maker is:        Discharge Planning:    Patient lives with: (P) Alone Type of Home: (P) House  Primary Care Giver: (P) Self  Patient Support Systems include: (P) Family Members, Friends/Neighbors   Current Financial resources:    Current community resources:    Current services prior to admission: (P) None            Current DME:              Type of Home Care services:  (P) None    ADLS  Prior functional level: (P) Independent in ADLs/IADLs  Current functional level: (P) Independent in ADLs/IADLs    PT AM-PAC:   /24  OT AM-PAC:   /24    Family can provide assistance at DC: (P) No  Would you like Case Management to discuss the discharge plan with any other family members/significant others, and if so, who? (P) Yes  Plans to Return to Present Housing: (P) Yes  Other Identified Issues/Barriers to RETURNING to current housing: Pt will return

## 2023-11-27 NOTE — PLAN OF CARE
Problem: Discharge Planning  Goal: Discharge to home or other facility with appropriate resources  Outcome: Progressing     Problem: Skin/Tissue Integrity  Goal: Absence of new skin breakdown  Description: 1. Monitor for areas of redness and/or skin breakdown  2. Assess vascular access sites hourly  3. Every 4-6 hours minimum:  Change oxygen saturation probe site  4. Every 4-6 hours:  If on nasal continuous positive airway pressure, respiratory therapy assess nares and determine need for appliance change or resting period.   Outcome: Progressing     Problem: Chronic Conditions and Co-morbidities  Goal: Patient's chronic conditions and co-morbidity symptoms are monitored and maintained or improved  Outcome: Progressing     Problem: Safety - Adult  Goal: Free from fall injury  Outcome: Progressing     Problem: Nutrition Deficit:  Goal: Optimize nutritional status  Outcome: Progressing     Problem: Pain  Goal: Verbalizes/displays adequate comfort level or baseline comfort level  Outcome: Progressing

## 2023-11-27 NOTE — CONSULTS
[address] [place] [address]    Name:  Geremias Aly  MR#:   [id number]  :  1950  ACCOUNT #:  [de-identified]  DATE OF SERVICE:  2023    REFERRING PROVIDER:  Marek Jones MD.    REASON FOR EVALUATION:  Pleural effusion, assess for lung cancer. HISTORY OF PRESENT ILLNESS:  The patient is a 75-year-old male. He presented with worsening shortness of breath of several days' duration. In the ER, he was noted to have a large right pleural effusion, acute pulmonary embolism, and multiple pulmonary masses. We have been asked to evaluate him. He had thoracentesis of 1.5 liters of pleural fluid. Pleural fluid cytology is pending. Postprocedure, he had redrainage of 2 liters of pleural fluid on 2023. This was complicated by hydropneumothorax requiring chest tube placement on 2023. CT scan of the chest on 2023 notes acute pulmonary embolism in left lobar and segmental pulmonary arteries without right heart strain, a 2.6 cm right upper lobe lung mass with metastatic pleural deposits, massive right pleural effusion and collapse of the right lung, as well as 4.7 cm metastatic subcarinal adenopathy. This morning, the patient is lying in bed. He does not complain of any chest pain, cough, or hemoptysis. PAST MEDICAL HISTORY:  Stage IV chronic renal failure, gout, hypertension. PAST SURGICAL HISTORY:  Unavailable. FAMILY HISTORY:  Noncontributory. SOCIAL HISTORY:  Single. He has never had tobacco use. REVIEW OF SYSTEMS:  Progressive shortness of breath over the last several days. No cough or hemoptysis. Denies chest pain. No nausea or vomiting. Denies weight loss. No headache, vision problems, or focal weakness. PHYSICAL EXAMINATION:  GENERAL:  An elderly male, lying in bed, hemodynamically stable. HEENT:  Pupils are equal.  Sclerae are anicteric. NECK:  No adenopathy. LUNGS:  Chest tube in place on the right side.   Reduced breath sounds on the right

## 2023-11-27 NOTE — PLAN OF CARE
Problem: Skin/Tissue Integrity  Goal: Absence of new skin breakdown  Description: 1. Monitor for areas of redness and/or skin breakdown  2. Assess vascular access sites hourly  3. Every 4-6 hours minimum:  Change oxygen saturation probe site  4. Every 4-6 hours:  If on nasal continuous positive airway pressure, respiratory therapy assess nares and determine need for appliance change or resting period.   Outcome: Progressing     Problem: Safety - Adult  Goal: Free from fall injury  Outcome: Progressing     Problem: Pain  Goal: Verbalizes/displays adequate comfort level or baseline comfort level  Outcome: Progressing  Flowsheets (Taken 11/27/2023 0424 by Summer Baldwin RN)  Verbalizes/displays adequate comfort level or baseline comfort level:   Encourage patient to monitor pain and request assistance   Assess pain using appropriate pain scale   Administer analgesics based on type and severity of pain and evaluate response   Implement non-pharmacological measures as appropriate and evaluate response   Consider cultural and social influences on pain and pain management   Notify Licensed Independent Practitioner if interventions unsuccessful or patient reports new pain

## 2023-11-27 NOTE — PROGRESS NOTES
conducted a Follow up consultation and Spiritual Assessment for Jignesh Payne, who is a 74 y. o.,male. The reason patient came to hospital is:   Patient Active Problem List    Diagnosis Date Noted    Acute hypoxic respiratory failure ([address]) 11/25/2023    Chronic heart failure with preserved ejection fraction ([address]) 11/25/2023    Moderate pulmonary hypertension ([address]) 11/25/2023    Elevated troponin 11/25/2023    Postprocedural pneumothorax 11/25/2023    Mass of right lung 11/23/2023    Pleural effusion 11/23/2023    Hypoxia 11/22/2023    Acute pulmonary thromboembolism ([address]) 11/22/2023    Pleural effusion on right 11/22/2023    CKD (chronic kidney disease) stage 4, GFR 15-29 ml/min ([address]) 11/22/2023    Hypertension 11/22/2023   . The  provided the following Interventions:  Continued the relationship of care and support. Listened empathically. Offered prayer and assurance of continued prayer on patients behalf. Chart reviewed. The following outcomes were achieved:  Patient expressed gratitude for 's visit. Assessment:  There are no further spiritual or [place] issues which require Spiritual Care Services interventions at this time. Plan:  Chaplains will continue to follow and will provide pastoral care on an as needed/requested basis.  recommends bedside caregivers page  on duty if patient shows signs of acute spiritual or emotional distress. Per patient, doing well, little anxious. NPO and have a procedure at some point today. Grateful for the visit. Prayer provided.      [facility]  Staff [facility]   [phone]

## 2023-11-27 NOTE — PROGRESS NOTES
CHEMISTRIES:  Recent Labs     11/25/23  0035 11/26/23  0135 11/27/23  0136    140 139   K 3.9 4.2 4.4    113* 112*   CO2 20* 20* 23   BUN 31* 26* 23*   CREATININE 2.24* 2.05* 1.77*   GLUCOSE 106* 148* 116*     PT/INR:No results for input(s): \"PROTIME\", \"INR\" in the last 72 hours. APTT:  Recent Labs     11/25/23  0035 11/25/23  1003   APTT 65.1* 156.4*     LIVER PROFILE:  No results for input(s): \"AST\", \"ALT\", \"BILIDIR\", \"BILITOT\", \"ALKPHOS\" in the last 72 hours. Imaging:  XR CHEST PORTABLE    Result Date: 11/26/2023  A chest tube has been placed with decrease in pleural fluid. Persistent small pneumothorax is present slightly decreased from previous. Pleural masses are again noted. Other densities are evident apparently the result of result pulmonary masses, incomplete reexpansion and pulmonary opacities. XR CHEST 1 VIEW    Result Date: 11/25/2023  1. Further interval increase in the size of the right sided pneumothorax. 2.  Multiple pleura-based masses. 3.  Mild right pleural effusion. Note:  Care team members are aware of the increased pneumothorax at the time of current report based on EPIC notes. Chest tube placement is planned. XR CHEST PORTABLE    Result Date: 11/25/2023  1. Increased right pneumothorax. 2.  Multiple pleura-based nodules. 3.  Atelectatic changes vs. infiltrate in the right mid and lower lung zones. US THORACENTESIS Which side should the procedure be performed? Right    Result Date: 11/24/2023  Successful right thoracentesis. Specimens obtained and sent to the lab. Residual right pleural effusion post procedure    US THORACENTESIS Which side should the procedure be performed? Right    Result Date: 11/24/2023  Successful right thoracentesis. Specimens obtained and sent to the lab.  Scant residual right pleural effusion post procedure    XR CHEST 1 VIEW    Addendum Date: 11/24/2023    Addendum: No pneumothorax was discussed with the patient's [address] [place] [address] Drive injection 4 mg, 4 mg, IntraVENous, Q6H PRN  polyethylene glycol (GLYCOLAX) packet 17 g, 17 g, Oral, Daily PRN  acetaminophen (TYLENOL) tablet 650 mg, 650 mg, Oral, Q6H PRN **OR** acetaminophen (TYLENOL) suppository 650 mg, 650 mg, Rectal, Q6H PRN  melatonin tablet 3 mg, 3 mg, Oral, Nightly  famotidine (PEPCID) tablet 10 mg, 10 mg, Oral, Daily      Assessment//Plan           Hospital Problems             Last Modified POA    Hypoxia 11/22/2023 Yes    Acute pulmonary thromboembolism ([address]) 11/22/2023 Yes    Pleural effusion on right 11/22/2023 Yes    CKD (chronic kidney disease) stage 4, GFR 15-29 ml/min ([address]) 11/22/2023 Yes    Hypertension 11/25/2023 Yes    Mass of right lung 11/23/2023 Yes    Pleural effusion 11/23/2023 Yes    Acute hypoxic respiratory failure ([address]) 11/25/2023 Yes    Chronic heart failure with preserved ejection fraction ([address]) 11/25/2023 Yes    Moderate pulmonary hypertension ([address]) 11/25/2023 Yes    Elevated troponin 11/25/2023 Yes    Postprocedural pneumothorax 11/25/2023 Yes     Assessment:    Condition: In stable condition. Improving. (    1. Acute respiratory failure w/ hypoxia. Supplemental oxygen resumed. 2. Massive right pleural effusion with RLL complete collapse, RML and RUL partial collapse, status post thoracentesis. Status post repeat thoracentesis 11/24/2023. Pending lung mass biopsy today  3. RUL 2.6 cm mass. CT with lobulated enhancing pleural masses, likely pleural deposits measuring 5.4 x 2.3 cm, 3.6 x 1.9 cm and mediastinal lymphadenopathy. Follow culture and cytology from pleural fluid studies. Outpatient follow-up with Dr. René Hightower. Oncology consulted, Dr. Brad Christie  4. Complex right hydropneumothorax s/p chest tube 11/25/23. 5. Acute PE, without cor pulmonale. On heparin gtt, transition to NOAC when no further procedures planned per all subspecialties. duplex LE noted. Echo noted. 6. Unintentional weight loss, decreased appetite  7.  Hypertension, with relative

## 2023-11-27 NOTE — OR NURSING
TRANSFER - OUT REPORT:    Verbal report given to SHONA Gonzalez on Rianna Miranda  being transferred to [address] [place] [address] for routine post-op       Report consisted of patient's Situation, Background, Assessment and   Recommendations(SBAR). Information from the following report(s) Nurse Handoff Report was reviewed with the receiving nurse. Lines:   Peripheral IV 11/22/23 Left;Proximal;Anterior Forearm (Active)   Site Assessment Clean, dry & intact 11/27/23 1200   Line Status Flushed;Capped 11/27/23 1200   Line Care Connections checked and tightened;Cap changed;Ports disinfected 11/27/23 1200   Phlebitis Assessment No symptoms 11/27/23 1200   Infiltration Assessment 0 11/27/23 1200   Alcohol Cap Used Yes 11/27/23 1200   Dressing Status Clean, dry & intact 11/27/23 1200   Dressing Type Transparent 11/27/23 1200       Peripheral IV 11/27/23 Right; Anterior Forearm (Active)   Site Assessment Clean, dry & intact 11/27/23 1200   Line Status Flushed;Capped 11/27/23 1200   Line Care Connections checked and tightened 11/27/23 1200   Phlebitis Assessment No symptoms 11/27/23 1200   Infiltration Assessment 0 11/27/23 1200   Alcohol Cap Used Yes 11/27/23 1200   Dressing Status Clean, dry & intact 11/27/23 1200   Dressing Type Transparent 11/27/23 1200        Opportunity for questions and clarification was provided.       Patient transported with:  Monitor and Registered Nurse

## 2023-11-27 NOTE — PLAN OF CARE
Problem: Discharge Planning  Goal: Discharge to home or other facility with appropriate resources  11/26/2023 2202 by Asha Martinez RN  Outcome: Progressing  11/26/2023 2001 by Elicia Richards RN  Outcome: Progressing  Flowsheets (Taken 11/26/2023 1906 by Asha Martinez RN)  Discharge to home or other facility with appropriate resources:   Identify barriers to discharge with patient and caregiver   Arrange for needed discharge resources and transportation as appropriate   Identify discharge learning needs (meds, wound care, etc)   Refer to discharge planning if patient needs post-hospital services based on physician order or complex needs related to functional status, cognitive ability or social support system     Problem: Skin/Tissue Integrity  Goal: Absence of new skin breakdown  Description: 1. Monitor for areas of redness and/or skin breakdown  2. Assess vascular access sites hourly  3. Every 4-6 hours minimum:  Change oxygen saturation probe site  4. Every 4-6 hours:  If on nasal continuous positive airway pressure, respiratory therapy assess nares and determine need for appliance change or resting period.   11/26/2023 2202 by Asha Martinez RN  Outcome: Progressing  11/26/2023 2001 by Elicia Richards RN  Outcome: Progressing     Problem: Chronic Conditions and Co-morbidities  Goal: Patient's chronic conditions and co-morbidity symptoms are monitored and maintained or improved  11/26/2023 2202 by Asha Martinez RN  Outcome: Progressing  11/26/2023 2001 by Elicia Richards RN  Outcome: Progressing  Flowsheets (Taken 11/26/2023 1906 by Asha Martinez RN)  Care Plan - Patient's Chronic Conditions and Co-Morbidity Symptoms are Monitored and Maintained or Improved:   Monitor and assess patient's chronic conditions and comorbid symptoms for stability, deterioration, or improvement   Collaborate with multidisciplinary team to address chronic and comorbid conditions and prevent exacerbation or deterioration

## 2023-11-27 NOTE — PROCEDURES
RADIOLOGY POST PROCEDURE NOTE     November 27, 2023       3:38 PM     Preoperative Diagnosis:   Right pleural mass    Postoperative Diagnosis:  Same. :  Sruthi Osborne MD    Assistant:  None. Type of Anesthesia: 1% plain lidocaine, moderate sedation    Procedure/Description:  Image-guided right pleural mass biopsy    Findings:   Successful biopsy of right pleural mass, 7 passes. No immediate complications. No increase in size of pre-existing right sided pneumothorax with chest tube in place. Estimated blood Loss:  Minimal    Specimen Removed:  Yes    Blood transfusions:  None. Implants:  None.     Complications: None    Condition: Stable    Blood thinning medications: OK to resume as clinically indicated    Discharge Plan:  continue present therapy    Sruthi Osborne MD

## 2023-11-27 NOTE — PROGRESS NOTES
0700: Bedside and Verbal shift change report given to Erica Archer RN (oncoming nurse) by Shiloh Rubalcava RN (offgoing nurse). Report included the following information Nurse Handoff Report, Index, Adult Overview, Intake/Output, MAR, Recent Results, and Cardiac Rhythm NSR .     0800: Informed by offgoing RNPamela, pt is to be NPO for biopsy procedure. Morning assessment and medications completed. Pt with no complaints of pain or discomfort at this time. 1035: Cardiology at bedside. 1041: EKG completed    1055: This writer was informed by Dr. Ellis Jameson to restart pt's heparin gtt 2 hours after his procedure. 1150: Pt assisted to [facility], and back to bed x1.     1400: Visitor at bedside. 1420: Pt off unit to radiology via bed. Pt assisted by this writer. 1440: This writer informed from radiology RN, Zetta Schwab, they will call when pt is ready to come back to unit. 0: Radiology called to inform this writer pt was ready for pickup. 1545: Pt escorted back to room from radiology via bed by this writer and radiology RN, Zetta Schwab.     0517: Pt back in room. Pt with no complaints of pain or discomfort at this time. VSS. Chest tube connected to suction. Bed left in lowest position with call light in reach. 1600: Visitor at bedside. 1745: Pt assisted with standing to use urinal at bedside. 1755: Heparin gtt restarted at 18 units/kg/hr. Next APTT due at 0000.    1910: Bedside and Verbal shift change report given to Edwina Valdez RN (oncoming nurse) by Erica Archer RN (offgoing nurse). Report included the following information Nurse Handoff Report, Index, Adult Overview, Intake/Output, MAR, Recent Results, and Cardiac Rhythm NSR .      1920: Verified heparin with oncoming RNEdwina. Care relinquished.

## 2023-11-28 ENCOUNTER — APPOINTMENT (OUTPATIENT)
Facility: HOSPITAL | Age: 73
End: 2023-11-28
Payer: MEDICARE

## 2023-11-28 ENCOUNTER — HOSPITAL ENCOUNTER (INPATIENT)
Facility: HOSPITAL | Age: 73
Discharge: HOME | End: 2023-12-01
Payer: MEDICARE

## 2023-11-28 PROBLEM — C79.9 METASTATIC ADENOCARCINOMA (HCC): Status: ACTIVE | Noted: 2023-11-28

## 2023-11-28 LAB
ADENOSINE DEAMINASE PLR-CCNC: 11 U/L (ref 0–30)
ANION GAP SERPL CALC-SCNC: 7 MMOL/L (ref 3–18)
APTT PPP: 120.1 SEC (ref 23–36.4)
APTT PPP: 80.3 SEC (ref 23–36.4)
APTT PPP: 90.4 SEC (ref 23–36.4)
APTT PPP: 91.4 SEC (ref 23–36.4)
BACTERIA SPEC CULT: NORMAL
BUN SERPL-MCNC: 28 MG/DL (ref 7–18)
BUN/CREAT SERPL: 14 (ref 12–20)
CALCIUM SERPL-MCNC: 9.1 MG/DL (ref 8.5–10.1)
CHLORIDE SERPL-SCNC: 107 MMOL/L (ref 100–111)
CO2 SERPL-SCNC: 21 MMOL/L (ref 21–32)
CREAT SERPL-MCNC: 1.96 MG/DL (ref 0.6–1.3)
ERYTHROCYTE [DISTWIDTH] IN BLOOD BY AUTOMATED COUNT: 12.3 % (ref 11.6–14.5)
GLUCOSE SERPL-MCNC: 154 MG/DL (ref 74–99)
GRAM STN SPEC: NORMAL
GRAM STN SPEC: NORMAL
HCT VFR BLD AUTO: 51.4 % (ref 36–48)
HGB BLD-MCNC: 16.1 G/DL (ref 13–16)
MCH RBC QN AUTO: 26.6 PG (ref 24–34)
MCHC RBC AUTO-ENTMCNC: 31.3 G/DL (ref 31–37)
MCV RBC AUTO: 85 FL (ref 78–100)
NRBC # BLD: 0 K/UL (ref 0–0.01)
NRBC BLD-RTO: 0 PER 100 WBC
PLATELET # BLD AUTO: 370 K/UL (ref 135–420)
PMV BLD AUTO: 10.2 FL (ref 9.2–11.8)
POTASSIUM SERPL-SCNC: 4 MMOL/L (ref 3.5–5.5)
RBC # BLD AUTO: 6.05 M/UL (ref 4.35–5.65)
SERVICE CMNT-IMP: NORMAL
SODIUM SERPL-SCNC: 135 MMOL/L (ref 136–145)
SPECIMEN SOURCE: NORMAL
WBC # BLD AUTO: 15.3 K/UL (ref 4.6–13.2)

## 2023-11-28 PROCEDURE — 85027 COMPLETE CBC AUTOMATED: CPT

## 2023-11-28 PROCEDURE — 2580000003 HC RX 258: Performed by: INTERNAL MEDICINE

## 2023-11-28 PROCEDURE — 6370000000 HC RX 637 (ALT 250 FOR IP): Performed by: HOSPITALIST

## 2023-11-28 PROCEDURE — 71045 X-RAY EXAM CHEST 1 VIEW: CPT

## 2023-11-28 PROCEDURE — 70553 MRI BRAIN STEM W/O & W/DYE: CPT

## 2023-11-28 PROCEDURE — 6370000000 HC RX 637 (ALT 250 FOR IP): Performed by: INTERNAL MEDICINE

## 2023-11-28 PROCEDURE — 74177 CT ABD & PELVIS W/CONTRAST: CPT

## 2023-11-28 PROCEDURE — 6360000004 HC RX CONTRAST MEDICATION: Performed by: STUDENT IN AN ORGANIZED HEALTH CARE EDUCATION/TRAINING PROGRAM

## 2023-11-28 PROCEDURE — 99233 SBSQ HOSP IP/OBS HIGH 50: CPT | Performed by: INTERNAL MEDICINE

## 2023-11-28 PROCEDURE — 80048 BASIC METABOLIC PNL TOTAL CA: CPT

## 2023-11-28 PROCEDURE — 94761 N-INVAS EAR/PLS OXIMETRY MLT: CPT

## 2023-11-28 PROCEDURE — 85730 THROMBOPLASTIN TIME PARTIAL: CPT

## 2023-11-28 PROCEDURE — 36415 COLL VENOUS BLD VENIPUNCTURE: CPT

## 2023-11-28 PROCEDURE — 78306 BONE IMAGING WHOLE BODY: CPT | Performed by: INTERNAL MEDICINE

## 2023-11-28 PROCEDURE — A9503 TC99M MEDRONATE: HCPCS | Performed by: INTERNAL MEDICINE

## 2023-11-28 PROCEDURE — 99232 SBSQ HOSP IP/OBS MODERATE 35: CPT | Performed by: INTERNAL MEDICINE

## 2023-11-28 PROCEDURE — 6360000002 HC RX W HCPCS: Performed by: STUDENT IN AN ORGANIZED HEALTH CARE EDUCATION/TRAINING PROGRAM

## 2023-11-28 PROCEDURE — APPSS15 APP SPLIT SHARED TIME 0-15 MINUTES

## 2023-11-28 PROCEDURE — 6360000004 HC RX CONTRAST MEDICATION: Performed by: INTERNAL MEDICINE

## 2023-11-28 PROCEDURE — 3430000000 HC RX DIAGNOSTIC RADIOPHARMACEUTICAL: Performed by: INTERNAL MEDICINE

## 2023-11-28 PROCEDURE — A9575 INJ GADOTERATE MEGLUMI 0.1ML: HCPCS | Performed by: STUDENT IN AN ORGANIZED HEALTH CARE EDUCATION/TRAINING PROGRAM

## 2023-11-28 PROCEDURE — 2140000001 HC CVICU INTERMEDIATE R&B

## 2023-11-28 RX ORDER — TC 99M MEDRONATE 20 MG/10ML
27.5 INJECTION, POWDER, LYOPHILIZED, FOR SOLUTION INTRAVENOUS
Status: COMPLETED | OUTPATIENT
Start: 2023-11-28 | End: 2023-11-28

## 2023-11-28 RX ORDER — IODIXANOL 320 MG/ML
80 INJECTION, SOLUTION INTRAVASCULAR
Status: COMPLETED | OUTPATIENT
Start: 2023-11-28 | End: 2023-11-28

## 2023-11-28 RX ADMIN — FAMOTIDINE 10 MG: 20 TABLET ORAL at 08:16

## 2023-11-28 RX ADMIN — CARVEDILOL 6.25 MG: 6.25 TABLET, FILM COATED ORAL at 08:16

## 2023-11-28 RX ADMIN — HEPARIN SODIUM 16 UNITS/KG/HR: 10000 INJECTION, SOLUTION INTRAVENOUS at 16:16

## 2023-11-28 RX ADMIN — CARVEDILOL 6.25 MG: 6.25 TABLET, FILM COATED ORAL at 16:16

## 2023-11-28 RX ADMIN — TC 99M MEDRONATE 27.5 MILLICURIE: 20 INJECTION, POWDER, LYOPHILIZED, FOR SOLUTION INTRAVENOUS at 11:20

## 2023-11-28 RX ADMIN — GADOTERATE MEGLUMINE 13 ML: 376.9 INJECTION INTRAVENOUS at 22:05

## 2023-11-28 RX ADMIN — Medication 3 MG: at 21:14

## 2023-11-28 RX ADMIN — AMLODIPINE BESYLATE 5 MG: 5 TABLET ORAL at 08:16

## 2023-11-28 RX ADMIN — SODIUM CHLORIDE, PRESERVATIVE FREE 10 ML: 5 INJECTION INTRAVENOUS at 21:14

## 2023-11-28 RX ADMIN — SODIUM CHLORIDE, PRESERVATIVE FREE 10 ML: 5 INJECTION INTRAVENOUS at 08:16

## 2023-11-28 RX ADMIN — IODIXANOL 80 ML: 320 INJECTION, SOLUTION INTRAVASCULAR at 14:03

## 2023-11-28 NOTE — PROGRESS NOTES
0700: Bedside and Verbal shift change report given to SHONA Gonzalez (oncoming nurse) by Lizeth Aviles RN (offgoing nurse). Report included the following information Nurse Handoff Report, Index, Adult Overview, Intake/Output, MAR, Recent Results, and Cardiac Rhythm NSR/Afib .    0810: Dr. Efren Grayson at bedside. Morning medications and assessment completed. Pt with no complaints of pain or discomfort at this time. Pt assisted to [facility] x1 assist.     0940: Pt assisted up to recliner; teeth brushed and face washed. Linen changed. 1045: Radiology at bedside to administer medication to complete bone scan scheduled for 1300.    1150: MRI screening form completed. 1310: Pt transported to Nuclear Medicine with this writer via bed. 1410: Pt arrived back on unit from radiology via bed. 1615: Pt assisted up to recliner for dinner. 1710: MRI called regarding off tele order to complete MRI scan. 1745: MD paged regarding tele. 1750: Telephone order received from Dr. Shun Pastor for pt to come off tele to complete MRI scan. RBV.     1820: Pt assisted back to bed.     1900: Noticeable bubbles in atrium. MD paged. 1903: MD returned page. Informed this writer to get in contact with pulmonology to consult their opinion. 1904: Pulmonology paged. Unable to get in touch with MD. Awaiting a response. 1915: Bedside and Verbal shift change report given to Alejandro Cole RN (oncoming nurse) by SHONA Gonzalez (offgoing nurse). Report included the following information Nurse Handoff Report, Index, Adult Overview, Intake/Output, MAR, Recent Results, and Cardiac Rhythm Aflutter . 1943:No response as of yet. Called [facility] again. 1945: This writer spoke with Dr. Eric Berger regarding possible air leak. MD inquired about daily imaging, pt's current status, and if chest tube is connected to suction. No new orders. 1950: Oncoming Sheridan NAGEL, updated on MD response to the chest tube. Care relinquished.

## 2023-11-28 NOTE — PROGRESS NOTES
MRI screening form needs to be filled out and faxed to [phone] BEFORE MRI can be scheduled. If unable to obtain information from patient , [employer] needs to be contacted .  If patient is claustrophobic or will needs pain meds, please have ordered in advance in order to facilitate exam.

## 2023-11-29 ENCOUNTER — APPOINTMENT (OUTPATIENT)
Facility: HOSPITAL | Age: 73
End: 2023-11-29
Payer: MEDICARE

## 2023-11-29 PROBLEM — I82.439 ACUTE DEEP VEIN THROMBOSIS (DVT) OF POPLITEAL VEIN (HCC): Status: ACTIVE | Noted: 2023-11-29

## 2023-11-29 PROBLEM — J90 PLEURAL EFFUSION, RIGHT: Status: ACTIVE | Noted: 2023-11-29

## 2023-11-29 PROBLEM — Z71.89 GOALS OF CARE, COUNSELING/DISCUSSION: Status: ACTIVE | Noted: 2023-11-29

## 2023-11-29 PROBLEM — C34.90 STAGE IV SQUAMOUS CELL CARCINOMA OF LUNG (HCC): Status: ACTIVE | Noted: 2023-11-29

## 2023-11-29 LAB
ADENOSINE DEAMINASE PLR-CCNC: 9 U/L (ref 0–30)
ANION GAP SERPL CALC-SCNC: 8 MMOL/L (ref 3–18)
APTT PPP: 120.4 SEC (ref 23–36.4)
APTT PPP: 166 SEC (ref 23–36.4)
APTT PPP: 44.6 SEC (ref 23–36.4)
BUN SERPL-MCNC: 32 MG/DL (ref 7–18)
BUN/CREAT SERPL: 16 (ref 12–20)
CALCIUM SERPL-MCNC: 8.9 MG/DL (ref 8.5–10.1)
CHLORIDE SERPL-SCNC: 105 MMOL/L (ref 100–111)
CO2 SERPL-SCNC: 21 MMOL/L (ref 21–32)
CREAT SERPL-MCNC: 1.94 MG/DL (ref 0.6–1.3)
ERYTHROCYTE [DISTWIDTH] IN BLOOD BY AUTOMATED COUNT: 12.1 % (ref 11.6–14.5)
GLUCOSE SERPL-MCNC: 105 MG/DL (ref 74–99)
HCT VFR BLD AUTO: 48.6 % (ref 36–48)
HGB BLD-MCNC: 15.6 G/DL (ref 13–16)
MCH RBC QN AUTO: 26.8 PG (ref 24–34)
MCHC RBC AUTO-ENTMCNC: 32.1 G/DL (ref 31–37)
MCV RBC AUTO: 83.5 FL (ref 78–100)
NRBC # BLD: 0 K/UL (ref 0–0.01)
NRBC BLD-RTO: 0 PER 100 WBC
PLATELET # BLD AUTO: 346 K/UL (ref 135–420)
PMV BLD AUTO: 10.4 FL (ref 9.2–11.8)
POTASSIUM SERPL-SCNC: 3.9 MMOL/L (ref 3.5–5.5)
RBC # BLD AUTO: 5.82 M/UL (ref 4.35–5.65)
SODIUM SERPL-SCNC: 134 MMOL/L (ref 136–145)
SPECIMEN SOURCE: NORMAL
WBC # BLD AUTO: 14.4 K/UL (ref 4.6–13.2)

## 2023-11-29 PROCEDURE — 97535 SELF CARE MNGMENT TRAINING: CPT

## 2023-11-29 PROCEDURE — 97162 PT EVAL MOD COMPLEX 30 MIN: CPT

## 2023-11-29 PROCEDURE — 80048 BASIC METABOLIC PNL TOTAL CA: CPT

## 2023-11-29 PROCEDURE — 85027 COMPLETE CBC AUTOMATED: CPT

## 2023-11-29 PROCEDURE — 6360000002 HC RX W HCPCS: Performed by: STUDENT IN AN ORGANIZED HEALTH CARE EDUCATION/TRAINING PROGRAM

## 2023-11-29 PROCEDURE — 6370000000 HC RX 637 (ALT 250 FOR IP): Performed by: HOSPITALIST

## 2023-11-29 PROCEDURE — 6370000000 HC RX 637 (ALT 250 FOR IP): Performed by: INTERNAL MEDICINE

## 2023-11-29 PROCEDURE — 85730 THROMBOPLASTIN TIME PARTIAL: CPT

## 2023-11-29 PROCEDURE — 36415 COLL VENOUS BLD VENIPUNCTURE: CPT

## 2023-11-29 PROCEDURE — 2140000001 HC CVICU INTERMEDIATE R&B

## 2023-11-29 PROCEDURE — 97166 OT EVAL MOD COMPLEX 45 MIN: CPT

## 2023-11-29 PROCEDURE — 99233 SBSQ HOSP IP/OBS HIGH 50: CPT | Performed by: INTERNAL MEDICINE

## 2023-11-29 PROCEDURE — 99222 1ST HOSP IP/OBS MODERATE 55: CPT | Performed by: NURSE PRACTITIONER

## 2023-11-29 PROCEDURE — 71045 X-RAY EXAM CHEST 1 VIEW: CPT

## 2023-11-29 PROCEDURE — 2580000003 HC RX 258: Performed by: INTERNAL MEDICINE

## 2023-11-29 PROCEDURE — 94761 N-INVAS EAR/PLS OXIMETRY MLT: CPT

## 2023-11-29 RX ADMIN — AMLODIPINE BESYLATE 5 MG: 5 TABLET ORAL at 08:55

## 2023-11-29 RX ADMIN — SODIUM CHLORIDE, PRESERVATIVE FREE 10 ML: 5 INJECTION INTRAVENOUS at 08:58

## 2023-11-29 RX ADMIN — SODIUM CHLORIDE, PRESERVATIVE FREE 10 ML: 5 INJECTION INTRAVENOUS at 20:57

## 2023-11-29 RX ADMIN — FAMOTIDINE 10 MG: 20 TABLET ORAL at 08:55

## 2023-11-29 RX ADMIN — CARVEDILOL 6.25 MG: 6.25 TABLET, FILM COATED ORAL at 17:10

## 2023-11-29 RX ADMIN — Medication 3 MG: at 20:57

## 2023-11-29 RX ADMIN — CARVEDILOL 6.25 MG: 6.25 TABLET, FILM COATED ORAL at 08:54

## 2023-11-29 RX ADMIN — HEPARIN SODIUM 20 UNITS/KG/HR: 10000 INJECTION, SOLUTION INTRAVENOUS at 12:49

## 2023-11-29 RX ADMIN — HEPARIN SODIUM 6170 UNITS: 1000 INJECTION INTRAVENOUS; SUBCUTANEOUS at 06:43

## 2023-11-29 ASSESSMENT — PAIN SCALES - GENERAL
PAINLEVEL_OUTOF10: 0

## 2023-11-29 NOTE — PROGRESS NOTES
1357: Spoke with Loyd Jacinto from Pulmonology, received orders to put chest tube to water seal at 9 pm.  Plan is to take out chest tube tomorrow.

## 2023-11-29 NOTE — PROGRESS NOTES
Bedside shift change report given to Liz Moy RN (oncoming nurse) by Estefania Marrufo RN (offgoing nurse). Report included the following information Nurse Handoff Report, Med Rec Status, Cardiac Rhythm NSR/ Afib, and Event Log.      1955- 1100 total ml in Pleurvac. Suction maintained at -20 cm on drainage system and -100 on wall setting. Heparin drip PTT result therapeutic. No bolus or rate change per protocol.

## 2023-11-29 NOTE — PROGRESS NOTES
0700: Bedside and Verbal shift change report given to Naresh Uriarte RN (oncoming nurse) by Nishi Polo RN (offgoing nurse). Report included the following information Nurse Handoff Report, Adult Overview, Intake/Output, Recent Results, Cardiac Rhythm NSR, Neuro Assessment, and Event Log.      1500: APTT results in, levels at 120. Adjusted Heparin gtt per protocol. 1600: Tele called to notify about pt heart rhythm, pt is Aflutter. 1615: Notified MD regarding pt status of heart rhythm of Aflutter. Pt is alert and awake, sitting up watching tv. Vital signs stable with HR at 94.    1700: Pt is heart rhythm is still at [facility]. Pt is sitting up in bed, alert and awake eating dinner. 1900: Bedside and Verbal shift change report given to Nishi Polo RN (oncoming nurse) by Naresh Uriarte RN (offgoing nurse). Report included the following information Nurse Handoff Report, Adult Overview, Intake/Output, Recent Results, Cardiac Rhythm A fluffer, Neuro Assessment, and Event Log.

## 2023-11-29 NOTE — ACP (ADVANCE CARE PLANNING)
Advance Care Planning     General Advance Care Planning (ACP) Conversation      Palliative Medicine    Advance Care Planning Conversation      The patient and/or family consented to a voluntary Advance Care Planning conversation. Individuals present for the conversation: Patient      Outcome of the conversation and documents completed:  Visit made to patient for  new consult along with Palliative team member Luanne Hernanedz NP. Patient resting quietly in bed, awake and alert. States he is doing \"ok\". Able to recall that  had been in earlier but does not remember what she had to say. Does state that he feels better since the thoracentesis, breathing is easier. Pt does not have an Advance Directive on file in EMR. He is agreeable to competing one today naming his niece Melissa Dobbins as his primary medical decision maker, and his niece  Devan Dunlap as the secondary decision maker. Patient lives alone, had one son who passed away 7 weeks ago. Patient did not engage in conversation, answered our questions with one to two words. Briefly discussed goals of care regarding the benefits and burdens of intubation and CPR in the event of respiratory decline or cardiopulmonary arrest, as well as the benefits and burdens of artificial nutrition. Patient stated he has never thought about this before. Informed patient that our team will continue to follow, provide support and answer any questions he may have.     Primary Decision Maker (Health Care Agent): Marcie FLORES [facility]  Relationship to patient: deysi  Phone number: [phone]  [x] Named in a scanned document   [] Legal Next of Kin  [] Guardian    Secondary Decision Maker ([facility] [address]): Doris Billings [place]  Relationship to patient: deysi  Phone number: [phone]  [x] Named in a scanned document   [] Legal Next of Kin  [] Guardian    ACP documents you currently have include:  [x] Advance Directive or Living Will  [] Durable Do Not Resuscitate  []

## 2023-11-29 NOTE — PLAN OF CARE
Problem: Physical Therapy - Adult  Goal: By Discharge: Performs mobility at highest level of function for planned discharge setting. See evaluation for individualized goals. Description: Physical Therapy Goals  Initiated 11/29/2023 and to be accomplished within 7 day(s)  1. Patient will move from supine to sit and sit to supine  in bed with modified independence. 2.  Patient will transfer from bed to chair and chair to bed with modified independence using the least restrictive device. 3.  Patient will perform sit to stand with modified independence. 4.  Patient will ambulate with modified independence for 150 feet with the least restrictive device. 5.  Patient will ascend/descend 3 stairs with handrail(s) with modified independence. PLOF: Lives alone. One story home with 3 steps to enter. Independent. Outcome: Progressing   PHYSICAL THERAPY EVALUATION    Patient: Raissa Roach (21 y.o. male)  Date: 11/29/2023  Primary Diagnosis: Hx of pulmonary embolus [Z86.711]  Acute pulmonary thromboembolism ([address]) [I26.99]  Precautions: Fall Risk  ASSESSMENT :  Seated in bed. IV heparin, chest tube to wall suction. Educated on [address] for meals and toileting and amb with staff supervision for line management. Mod I for bed mobility. Supervision for transfers. Amb 50ft with supervision; limited by lines. Short shuffled steps with narrow base of support. One loss of balance posteriorly; able to self correct with delayed response. Declines OOB to chair d/t room temperature; offered blankets in chair and continues to decline. Returned to seated in bed. Educated on need for RN assistance with mobility; verbalized understanding. Call bell in reach. Will follow 1-2 more visits to progress amb with resolution of lines. Encourage amb and OOB with all staff. DEFICITS/IMPAIRMENTS:   Body Structures, Functions, Activity Limitations Requiring Skilled Therapeutic Intervention: Decreased functional mobility ; Decreased Learning: None  Education Outcome: Verbalized understanding;Demonstrated understanding;Continued education needed    Thank you for this referral.  Nishi Ortiz, PT  Minutes: 14    Eval Complexity: Decision Making: Medium Complexity

## 2023-11-29 NOTE — CARE COORDINATION
Plan of care discussed with MD in interdisciplinary rounds. 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 Currently with chest tube. Pulmonary to evaluated pt for pleurex catheter if needed. MRI of brain pending. PT/OT to eval for disposition needs. CM will continue to follow.     [facility] RN CDCES  Case Management

## 2023-11-29 NOTE — PLAN OF CARE
Problem: Discharge Planning  Goal: Discharge to home or other facility with appropriate resources  Outcome: Progressing  Flowsheets (Taken 11/29/2023 0800)  Discharge to home or other facility with appropriate resources:   Identify discharge learning needs (meds, wound care, etc)   Refer to discharge planning if patient needs post-hospital services based on physician order or complex needs related to functional status, cognitive ability or social support system   Arrange for needed discharge resources and transportation as appropriate   Identify barriers to discharge with patient and caregiver     Problem: Skin/Tissue Integrity  Goal: Absence of new skin breakdown  Description: 1. Monitor for areas of redness and/or skin breakdown  2. Assess vascular access sites hourly  3. Every 4-6 hours minimum:  Change oxygen saturation probe site  4. Every 4-6 hours:  If on nasal continuous positive airway pressure, respiratory therapy assess nares and determine need for appliance change or resting period.   Outcome: Progressing     Problem: Chronic Conditions and Co-morbidities  Goal: Patient's chronic conditions and co-morbidity symptoms are monitored and maintained or improved  Outcome: Progressing  Flowsheets (Taken 11/29/2023 0800)  Care Plan - Patient's Chronic Conditions and Co-Morbidity Symptoms are Monitored and Maintained or Improved:   Monitor and assess patient's chronic conditions and comorbid symptoms for stability, deterioration, or improvement   Collaborate with multidisciplinary team to address chronic and comorbid conditions and prevent exacerbation or deterioration   Update acute care plan with appropriate goals if chronic or comorbid symptoms are exacerbated and prevent overall improvement and discharge     Problem: Safety - Adult  Goal: Free from fall injury  Outcome: Progressing     Problem: Nutrition Deficit:  Goal: Optimize nutritional status  Outcome: Progressing     Problem: Pain  Goal:

## 2023-11-29 NOTE — CONSULTS
this before and would appreciate time to think about it. He does appear to be still grieving over his son's death. Our team will continue to follow for support goals of care are currently full code with full interventions. Initial consult note routed to primary continuity provider  Communicated plan of care with: Palliative IDT             TREATMENT PREFERENCES:   Code Status: Full Code    Advance Care Planning:  [] [facility] Interdisciplinary Team has updated the ACP Navigator with [address] and Patient Capacity    Primary Decision [facility] ([address] [place]):   Primary Decision Maker: Griselda Doshi - Niece/Nephew - [phone]    Secondary Decision Maker: Jocelin Stuart - Niece/Nephew - [phone]                Other:  As far as possible, the palliative care team has discussed with patient / health care proxy about goals of care / treatment preferences for patient. HISTORY:     History obtained from: chart and patient     CHIEF COMPLAINT: stage IV lung cancer     HPI/SUBJECTIVE:    The patient is:   [x] Verbal and participatory  [] Non-participatory due to:   Please see summary      Clinical Pain Assessment (nonverbal scale for nonverbal patients):            Duration: for how long has pt been experiencing pain (e.g., 2 days, 1 month, years)  Frequency: how often pain is an issue (e.g., several times per day, once every few days, constant)     FUNCTIONAL ASSESSMENT:     Palliative Performance Scale (PPS): 60               PSYCHOSOCIAL/SPIRITUAL SCREENING:      Any spiritual / Zoroastrian concerns:  [] Yes /  [x] No    Caregiver Burnout:  [] Yes /  [] No /  [] No Caregiver Present      Anticipatory grief assessment:   [x] Normal  / [] Maladaptive        REVIEW OF SYSTEMS:     Positive and pertinent negative findings in ROS are noted above in HPI. The following systems were [x] reviewed / [] unable to be reviewed as noted in HPI  Other findings are noted below.   Systems: \"CPK\", \"CKMB\", \"TROPONINI\"           Total time: 55 minutes

## 2023-11-30 ENCOUNTER — APPOINTMENT (OUTPATIENT)
Facility: HOSPITAL | Age: 73
End: 2023-11-30
Payer: MEDICARE

## 2023-11-30 LAB
ANION GAP SERPL CALC-SCNC: 7 MMOL/L (ref 3–18)
APTT PPP: 56 SEC (ref 23–36.4)
APTT PPP: 92.3 SEC (ref 23–36.4)
BUN SERPL-MCNC: 35 MG/DL (ref 7–18)
BUN/CREAT SERPL: 16 (ref 12–20)
CALCIUM SERPL-MCNC: 8.8 MG/DL (ref 8.5–10.1)
CHLORIDE SERPL-SCNC: 104 MMOL/L (ref 100–111)
CO2 SERPL-SCNC: 21 MMOL/L (ref 21–32)
CREAT SERPL-MCNC: 2.13 MG/DL (ref 0.6–1.3)
ERYTHROCYTE [DISTWIDTH] IN BLOOD BY AUTOMATED COUNT: 12.2 % (ref 11.6–14.5)
GLUCOSE SERPL-MCNC: 107 MG/DL (ref 74–99)
HCT VFR BLD AUTO: 48.5 % (ref 36–48)
HGB BLD-MCNC: 15.5 G/DL (ref 13–16)
MCH RBC QN AUTO: 26.7 PG (ref 24–34)
MCHC RBC AUTO-ENTMCNC: 32 G/DL (ref 31–37)
MCV RBC AUTO: 83.5 FL (ref 78–100)
NRBC # BLD: 0 K/UL (ref 0–0.01)
NRBC BLD-RTO: 0 PER 100 WBC
PLATELET # BLD AUTO: 347 K/UL (ref 135–420)
PMV BLD AUTO: 10.1 FL (ref 9.2–11.8)
POTASSIUM SERPL-SCNC: 4.1 MMOL/L (ref 3.5–5.5)
RBC # BLD AUTO: 5.81 M/UL (ref 4.35–5.65)
SODIUM SERPL-SCNC: 132 MMOL/L (ref 136–145)
WBC # BLD AUTO: 14.4 K/UL (ref 4.6–13.2)

## 2023-11-30 PROCEDURE — 6370000000 HC RX 637 (ALT 250 FOR IP): Performed by: INTERNAL MEDICINE

## 2023-11-30 PROCEDURE — 6370000000 HC RX 637 (ALT 250 FOR IP): Performed by: HOSPITALIST

## 2023-11-30 PROCEDURE — 0WP9X0Z REMOVAL OF DRAINAGE DEVICE FROM RIGHT PLEURAL CAVITY, EXTERNAL APPROACH: ICD-10-PCS | Performed by: RADIOLOGY

## 2023-11-30 PROCEDURE — 6370000000 HC RX 637 (ALT 250 FOR IP): Performed by: STUDENT IN AN ORGANIZED HEALTH CARE EDUCATION/TRAINING PROGRAM

## 2023-11-30 PROCEDURE — 85730 THROMBOPLASTIN TIME PARTIAL: CPT

## 2023-11-30 PROCEDURE — 80048 BASIC METABOLIC PNL TOTAL CA: CPT

## 2023-11-30 PROCEDURE — 71045 X-RAY EXAM CHEST 1 VIEW: CPT

## 2023-11-30 PROCEDURE — 6360000002 HC RX W HCPCS: Performed by: STUDENT IN AN ORGANIZED HEALTH CARE EDUCATION/TRAINING PROGRAM

## 2023-11-30 PROCEDURE — 99232 SBSQ HOSP IP/OBS MODERATE 35: CPT | Performed by: INTERNAL MEDICINE

## 2023-11-30 PROCEDURE — 2580000003 HC RX 258: Performed by: INTERNAL MEDICINE

## 2023-11-30 PROCEDURE — 94761 N-INVAS EAR/PLS OXIMETRY MLT: CPT

## 2023-11-30 PROCEDURE — 85027 COMPLETE CBC AUTOMATED: CPT

## 2023-11-30 PROCEDURE — 2140000001 HC CVICU INTERMEDIATE R&B

## 2023-11-30 PROCEDURE — 36415 COLL VENOUS BLD VENIPUNCTURE: CPT

## 2023-11-30 RX ADMIN — HEPARIN SODIUM 3080 UNITS: 1000 INJECTION INTRAVENOUS; SUBCUTANEOUS at 14:46

## 2023-11-30 RX ADMIN — SODIUM CHLORIDE, PRESERVATIVE FREE 10 ML: 5 INJECTION INTRAVENOUS at 20:30

## 2023-11-30 RX ADMIN — HEPARIN SODIUM 15 UNITS/KG/HR: 10000 INJECTION, SOLUTION INTRAVENOUS at 11:44

## 2023-11-30 RX ADMIN — FAMOTIDINE 10 MG: 20 TABLET ORAL at 10:22

## 2023-11-30 RX ADMIN — APIXABAN 5 MG: 5 TABLET, FILM COATED ORAL at 16:59

## 2023-11-30 RX ADMIN — AMLODIPINE BESYLATE 5 MG: 5 TABLET ORAL at 10:22

## 2023-11-30 RX ADMIN — Medication 3 MG: at 23:09

## 2023-11-30 RX ADMIN — SODIUM CHLORIDE, PRESERVATIVE FREE 10 ML: 5 INJECTION INTRAVENOUS at 10:26

## 2023-11-30 ASSESSMENT — PAIN SCALES - GENERAL
PAINLEVEL_OUTOF10: 0
PAINLEVEL_OUTOF10: 0
PAINLEVEL_OUTOF10: 2
PAINLEVEL_OUTOF10: 0

## 2023-11-30 ASSESSMENT — PAIN DESCRIPTION - DESCRIPTORS: DESCRIPTORS: DISCOMFORT

## 2023-11-30 ASSESSMENT — PAIN DESCRIPTION - ORIENTATION: ORIENTATION: RIGHT;POSTERIOR

## 2023-11-30 ASSESSMENT — PAIN DESCRIPTION - LOCATION: LOCATION: BACK

## 2023-11-30 ASSESSMENT — ENCOUNTER SYMPTOMS
NAUSEA: 0
WHEEZING: 0
ABDOMINAL PAIN: 0
COUGH: 0
BACK PAIN: 0

## 2023-11-30 NOTE — PLAN OF CARE
Problem: Discharge Planning  Goal: Discharge to home or other facility with appropriate resources  11/29/2023 2312 by Fer José RN  Outcome: Progressing  11/29/2023 1314 by Kahlil Montano RN  Outcome: Progressing  Flowsheets (Taken 11/29/2023 0800)  Discharge to home or other facility with appropriate resources:   Identify discharge learning needs (meds, wound care, etc)   Refer to discharge planning if patient needs post-hospital services based on physician order or complex needs related to functional status, cognitive ability or social support system   Arrange for needed discharge resources and transportation as appropriate   Identify barriers to discharge with patient and caregiver     Problem: Skin/Tissue Integrity  Goal: Absence of new skin breakdown  Description: 1. Monitor for areas of redness and/or skin breakdown  2. Assess vascular access sites hourly  3. Every 4-6 hours minimum:  Change oxygen saturation probe site  4. Every 4-6 hours:  If on nasal continuous positive airway pressure, respiratory therapy assess nares and determine need for appliance change or resting period.   11/29/2023 2312 by Fer José RN  Outcome: Progressing  11/29/2023 1314 by Kahlil Montano RN  Outcome: Progressing     Problem: Chronic Conditions and Co-morbidities  Goal: Patient's chronic conditions and co-morbidity symptoms are monitored and maintained or improved  11/29/2023 2312 by Fer José RN  Outcome: Progressing  11/29/2023 1314 by Kahlil Montano RN  Outcome: Progressing  Flowsheets (Taken 11/29/2023 0800)  Care Plan - Patient's Chronic Conditions and Co-Morbidity Symptoms are Monitored and Maintained or Improved:   Monitor and assess patient's chronic conditions and comorbid symptoms for stability, deterioration, or improvement   Collaborate with multidisciplinary team to address chronic and comorbid conditions and prevent exacerbation or deterioration   Update acute care plan with appropriate

## 2023-11-30 NOTE — PLAN OF CARE
Problem: Discharge Planning  Goal: Discharge to home or other facility with appropriate resources  11/30/2023 1212 by Abe Valdez RN  Outcome: Progressing  Flowsheets (Taken 11/30/2023 5740)  Discharge to home or other facility with appropriate resources:   Identify barriers to discharge with patient and caregiver   Arrange for needed discharge resources and transportation as appropriate   Arrange for interpreters to assist at discharge as needed   Refer to discharge planning if patient needs post-hospital services based on physician order or complex needs related to functional status, cognitive ability or social support system  11/29/2023 2312 by Ban Heredia RN  Outcome: Progressing     Problem: Skin/Tissue Integrity  Goal: Absence of new skin breakdown  Description: 1. Monitor for areas of redness and/or skin breakdown  2. Assess vascular access sites hourly  3. Every 4-6 hours minimum:  Change oxygen saturation probe site  4. Every 4-6 hours:  If on nasal continuous positive airway pressure, respiratory therapy assess nares and determine need for appliance change or resting period.   11/30/2023 1212 by Abe Valdez RN  Outcome: Progressing  11/29/2023 2312 by Ban Heredia RN  Outcome: Progressing     Problem: Chronic Conditions and Co-morbidities  Goal: Patient's chronic conditions and co-morbidity symptoms are monitored and maintained or improved  11/30/2023 1212 by Abe Valdez RN  Outcome: Progressing  Flowsheets (Taken 11/30/2023 0850)  Care Plan - Patient's Chronic Conditions and Co-Morbidity Symptoms are Monitored and Maintained or Improved:   Monitor and assess patient's chronic conditions and comorbid symptoms for stability, deterioration, or improvement   Collaborate with multidisciplinary team to address chronic and comorbid conditions and prevent exacerbation or deterioration   Update acute care plan with appropriate goals if chronic or comorbid symptoms are exacerbated and prevent

## 2023-11-30 NOTE — PROGRESS NOTES
0715: Bedside and Verbal shift change report given to Christina Monroe RN (oncoming nurse) by Rom Vasquez RN (offgoing nurse). Report included the following information Nurse Handoff Report, Adult Overview, Intake/Output, Recent Results, Cardiac Rhythm Sinus tach, Alarm Parameters, and Event Log.     1150: Pulmonologist Ame in room with pt for the removal of the chest tube. 1157: Chest tube removed by pulmonologist. Dressing change required every shift per pulmonologist orders. Pt sitting up in chair, alert and awake eating lunch. 1420: Chest tube incision dressing saturated. Dressing change preformed on incision - ABD pad and gauze placed for dressing. 1920: Bedside and Verbal shift change report given to SHONA Salas (oncoming nurse) by Christina Monroe RN (offgoing nurse). Report included the following information Nurse Handoff Report, Adult Overview, Intake/Output, Recent Results, Cardiac Rhythm NSR - sinus tach, and Neuro Assessment.

## 2023-11-30 NOTE — PROCEDURES
R CHEST TUBE REMOVAL  Time: 11:57am  Chest tube on water seal overnight for the last 15 hrs. AM CXR reviewed. Procedure explained to patient. Sutures securing chest tube were removed with suture removal kit. Xeroform dressing and bulky 4X4 dressing applied. Patient asked to take a deep breath and tube removed during slow expiration without resistance. Some serous effusion out with tube removal.  Dressing secured in place. Patient tolerated the procedure well.     Follow up CXR ordered for Tiffanie Olmstead PhD., PA-C.  05/28/24

## 2023-11-30 NOTE — CARE COORDINATION
Home health order noted. Patient states his MD is Dr. Angie Neville at the [facility]. Home health orders and clinicals faxed to the flow depot at the [facility] ([phone]). Left message for Josesito Kelley ([phone] ext [phone]) to please return call to this CM to assist with setting up home health for pt.     [facility] RN CDCES  Case Management

## 2023-11-30 NOTE — PROGRESS NOTES
[facility]  Pulmonary, Critical Care, and Sleep Medicine    Name: Rama Robbins MRN: [id number]   [facility] 1950 [facility]   Date: [facility]        Pulmonary Medicine: Daily Progress Note    Admission Date:   2023  LOS: 8  MAR reviewed and pertinent medications noted or modified as needed    IMPRESSION:   Acute hypoxic respiratory failure - resolved; on RA. Likely multifactorial.  Acute pulmonary embolism - Left lobar and segmental pulmonary artery. No RHS on CT. TTE completed. Troponin and BNP wnl. Pericardial effusion, moderate. Without tamponade as noted on TTE. Right pleural effusion - large; likely malignant effusion. Exudative by lights criteria. S/p Rt thoracentesis () 1.5 L removed; exudative; cytology pending. S/p Rpt Rt thora () w/ 2L of dark harika fluid removed; exudative - cytology and cultures ordered. Pleural fluid cytology positive for metastatic adenocarcinoma with diffuse  tumor cells immunochemistry positive for CK7/TTF-1/Napsin-A, negative for p63/calretinin  Right Pneumothorax: enlarging Right PTX after repeat thoracentesis (). Likely due to trapped lung. Right chest tube in place, image guided placement on , for hydropneumothorax, drainage o/p 1086cc serosanguionous drainage. slowed down  Chest tube removed   multiple pulmonary masses - CT with lobulated enhancing pleural masses, likely pleural deposits measuring 5.4 x 2.3 cm, 3.6 x 1.9 cm and mediastinal lymphadenopathy. Complete collapse of RLL and partial collapse of RUL noted. S/p CT guided biopsy of R pleural mass () . Surgical pathology findings with rare atypical cells suspicious of a small focus of adenocarcinoma  Bone scan and CT abdomen negative for mets  Grade I diastolic dysfunction on echo ()  Gouty arthritis: Uric acid elevated.    HTN       Patient Active Problem List   Diagnosis    Hypoxia    Acute pulmonary thromboembolism ([address])

## 2023-11-30 NOTE — PROGRESS NOTES
Comprehensive Nutrition Assessment    Type and Reason for Visit:  Reassess, Positive Nutrition Screen, Consult    Nutrition Recommendations/Plan:   Continue current diet- encourage PO intake. Plan to add oral nutrition supplement to optimize nutrition intake opportunity: Magic Cup (each provides 290 kcal, 9g protein) BID  Continue to monitor PO intake/tolerance of meals/supplements, weight, labs, and POC while admitted. Malnutrition Assessment:  Malnutrition Status: At risk for malnutrition (Comment) (variable/poor meal intake during admission, decreased appetite PTA) (11/30/23 1036)    Context:  Acute Illness       Nutrition Assessment:    Pt admitted with acute pulmonary embolus. Per H&P, pt reported experiencing fatigue, lack of appetite, and wt loss since his son's passing 1.5 month ago. Per flow sheets, pt with meal intake 1-75% since previous RD assessment. Visited pt- endorsed usual meal intake of ~25%. Unable to obtain preferences for meal items, though pt ate >50% of breakfast this morning. Pt reconsidered and was agreeable to receiving magic cup supplement, flavor preferences discussed. Nutrition Related Findings:    Last BM: 11/29. Edema: none. Labs: Na 132 L, K 4.1 WNL, Cl 104 WNL, BUN/Cr 35/2.13 H, GFR 32 L, Ca 8.8 WNL. Pertinent meds: Pepcid. Wound Type: None       Current Nutrition Intake & Therapies:    Average Meal Intake: 1-25%, 51-75%  Average Supplements Intake: None Ordered  ADULT DIET; Regular; Low Sodium (2 gm)    Anthropometric Measures:  Height: 180.3 cm (5' 10.98\")  Ideal Body Weight (IBW): 172 lbs (78 kg)    Admission Body Weight: 77.1 kg (169 lb 15.6 oz)  Current Body Weight: 77.1 kg (169 lb 15.6 oz), 98.8 % IBW.  Weight Source: Not Specified  Current BMI (kg/m2): 23.7  Usual Body Weight: 80.7 kg (178 lb)  % Weight Change (Calculated): -4.5  Weight Adjustment For: No Adjustment  BMI Categories: Normal Weight (BMI 22.0 to 24.9) age over 72    Estimated Daily Nutrient Needs:  Energy Requirements Based On: Formula (MSJ x1.2-1.3)  Weight Used for Energy Requirements: Admission  Energy (kcal/day): 3497-9623  Weight Used for Protein Requirements: Admission  Protein (g/day): 62-77 (wt x0.8-1)  Method Used for Fluid Requirements: 1 ml/kcal  Fluid (ml/day): 7403-4574    Nutrition Diagnosis:   Inadequate oral intake related to psychological cause or life stress (decreased appetite) as evidenced by intake 0-25%, intake 51-75%    Nutrition Interventions:   Food and/or Nutrient Delivery: Continue Current Diet, Start Oral Nutrition Supplement  Nutrition Education/Counseling: No recommendation at this time, Education not indicated  Coordination of Nutrition Care: Continue to monitor while inpatient  Plan of Care discussed with: Pt    Goals:  Previous Goal Met: Progressing toward Goal(s)  Goals: Meet at least 75% of estimated needs, by next RD assessment       Nutrition Monitoring and Evaluation:   Behavioral-Environmental Outcomes: None Identified  Food/Nutrient Intake Outcomes: Diet Advancement/Tolerance, Food and Nutrient Intake, Supplement Intake  Physical Signs/Symptoms Outcomes: Biochemical Data, GI Status, Meal Time Behavior, Nutrition Focused Physical Findings, Weight    Discharge Planning:    Continue current diet     [place], [place]  Contact: [phone]

## 2023-12-01 VITALS
HEIGHT: 71 IN | HEART RATE: 78 BPM | BODY MASS INDEX: 23.8 KG/M2 | TEMPERATURE: 97.4 F | SYSTOLIC BLOOD PRESSURE: 92 MMHG | RESPIRATION RATE: 16 BRPM | OXYGEN SATURATION: 98 % | DIASTOLIC BLOOD PRESSURE: 61 MMHG | WEIGHT: 170 LBS

## 2023-12-01 LAB
ANION GAP SERPL CALC-SCNC: 9 MMOL/L (ref 3–18)
BUN SERPL-MCNC: 40 MG/DL (ref 7–18)
BUN/CREAT SERPL: 18 (ref 12–20)
CALCIUM SERPL-MCNC: 8.7 MG/DL (ref 8.5–10.1)
CHLORIDE SERPL-SCNC: 103 MMOL/L (ref 100–111)
CO2 SERPL-SCNC: 21 MMOL/L (ref 21–32)
CREAT SERPL-MCNC: 2.2 MG/DL (ref 0.6–1.3)
ERYTHROCYTE [DISTWIDTH] IN BLOOD BY AUTOMATED COUNT: 12.3 % (ref 11.6–14.5)
GLUCOSE SERPL-MCNC: 103 MG/DL (ref 74–99)
HCT VFR BLD AUTO: 46.4 % (ref 36–48)
HGB BLD-MCNC: 14.8 G/DL (ref 13–16)
MCH RBC QN AUTO: 26.6 PG (ref 24–34)
MCHC RBC AUTO-ENTMCNC: 31.9 G/DL (ref 31–37)
MCV RBC AUTO: 83.3 FL (ref 78–100)
NRBC # BLD: 0 K/UL (ref 0–0.01)
NRBC BLD-RTO: 0 PER 100 WBC
PLATELET # BLD AUTO: 315 K/UL (ref 135–420)
PMV BLD AUTO: 10.2 FL (ref 9.2–11.8)
POTASSIUM SERPL-SCNC: 3.8 MMOL/L (ref 3.5–5.5)
RBC # BLD AUTO: 5.57 M/UL (ref 4.35–5.65)
SODIUM SERPL-SCNC: 133 MMOL/L (ref 136–145)
WBC # BLD AUTO: 13.9 K/UL (ref 4.6–13.2)

## 2023-12-01 PROCEDURE — 2580000003 HC RX 258: Performed by: INTERNAL MEDICINE

## 2023-12-01 PROCEDURE — 6370000000 HC RX 637 (ALT 250 FOR IP): Performed by: INTERNAL MEDICINE

## 2023-12-01 PROCEDURE — 99232 SBSQ HOSP IP/OBS MODERATE 35: CPT | Performed by: NURSE PRACTITIONER

## 2023-12-01 PROCEDURE — 36415 COLL VENOUS BLD VENIPUNCTURE: CPT

## 2023-12-01 PROCEDURE — 94761 N-INVAS EAR/PLS OXIMETRY MLT: CPT

## 2023-12-01 PROCEDURE — 80048 BASIC METABOLIC PNL TOTAL CA: CPT

## 2023-12-01 PROCEDURE — 6370000000 HC RX 637 (ALT 250 FOR IP): Performed by: STUDENT IN AN ORGANIZED HEALTH CARE EDUCATION/TRAINING PROGRAM

## 2023-12-01 PROCEDURE — 99232 SBSQ HOSP IP/OBS MODERATE 35: CPT | Performed by: INTERNAL MEDICINE

## 2023-12-01 PROCEDURE — 85027 COMPLETE CBC AUTOMATED: CPT

## 2023-12-01 RX ORDER — CARVEDILOL 6.25 MG/1
6.25 TABLET ORAL 2 TIMES DAILY WITH MEALS
Qty: 60 TABLET | Refills: 3 | Status: SHIPPED | OUTPATIENT
Start: 2023-12-01 | End: 2023-12-01 | Stop reason: SDUPTHER

## 2023-12-01 RX ORDER — CARVEDILOL 6.25 MG/1
6.25 TABLET ORAL 2 TIMES DAILY WITH MEALS
Qty: 60 TABLET | Refills: 0 | Status: SHIPPED | OUTPATIENT
Start: 2023-12-01

## 2023-12-01 RX ORDER — AMLODIPINE BESYLATE 5 MG/1
5 TABLET ORAL DAILY
Qty: 30 TABLET | Refills: 3 | Status: SHIPPED | OUTPATIENT
Start: 2023-12-02 | End: 2023-12-01 | Stop reason: SDUPTHER

## 2023-12-01 RX ORDER — AMLODIPINE BESYLATE 5 MG/1
5 TABLET ORAL DAILY
Qty: 30 TABLET | Refills: 0 | Status: SHIPPED | OUTPATIENT
Start: 2023-12-02

## 2023-12-01 RX ADMIN — SODIUM CHLORIDE, PRESERVATIVE FREE 10 ML: 5 INJECTION INTRAVENOUS at 09:00

## 2023-12-01 RX ADMIN — AMLODIPINE BESYLATE 5 MG: 5 TABLET ORAL at 08:58

## 2023-12-01 RX ADMIN — APIXABAN 5 MG: 5 TABLET, FILM COATED ORAL at 08:58

## 2023-12-01 RX ADMIN — FAMOTIDINE 10 MG: 20 TABLET ORAL at 08:57

## 2023-12-01 ASSESSMENT — ENCOUNTER SYMPTOMS
BACK PAIN: 0
NAUSEA: 0
WHEEZING: 0
COUGH: 0
ABDOMINAL PAIN: 0

## 2023-12-01 NOTE — PROGRESS NOTES
[address] [phone]           Palliative Care Supportive Visit and Follow-Up:    This writer, along with Oziel Woods NP, with the Palliative Care team; visited with patient today to offer support and also follow up with him regarding goals of care decisions. Patient was sitting in the chair and alert and oriented. Patient was not very engaging in the conversation. He offered very brief responses to questions that were asked of him. Patient reported to feeling ok. The only issue he reported to having was his hiccups. Other than that; he was fine. Patient reported that he met with the Dr. Brad Christie today and the plan is for him to follow up with her in her office. Patient denied having any transportation issues that could prevent him from going to his follow up appointment with Dr. Brad Christie. Patient was then asked about goals of care decisions. Patient was re-educated on the CPR. Patient reported that he is not ready to make a decision about resuscitation, at this time. He fully understands that he will remain a full code until he decides differently. Patient was encouraged to think seriously about his decision and also keep his decision makers in the loop with whatever he decides. He verbalized that he will. At this time, patient will remain a FULL CODE WITH FULL INTERVENTIONS. Recommendations: Continue to think about his decision regarding resuscitation, in the event that patient's heart and breathing were to stop. Meera Verde., [facility]  Palliative Care   AV#[phone]

## 2023-12-01 NOTE — DISCHARGE INSTRUCTIONS
Continue incentive spirometry upon discharge with the prescribed medications. DISCHARGE SUMMARY from Nurse    PATIENT INSTRUCTIONS:    After general anesthesia or intravenous sedation, for 24 hours or while taking prescription Narcotics:  Limit your activities  Do not drive and operate hazardous machinery  Do not make important personal or business decisions  Do  not drink alcoholic beverages  If you have not urinated within 8 hours after discharge, please contact your surgeon on call. Report the following to your surgeon:  Excessive pain, swelling, redness or odor of or around the surgical area  Temperature over 100.5  Nausea and vomiting lasting longer than 4 hours or if unable to take medications  Any signs of decreased circulation or nerve impairment to extremity: change in color, persistent  numbness, tingling, coldness or increase pain  Any questions    What to do at Home:  Recommended activity: activity as tolerated,     If you experience any of the following symptoms chest pain, shortness of breath, fever greater than 100.5, nausea, vomiting, please follow up with Dr Jesusita Brothers call for appointment in 2 days, Dr Lida Barakat call for appointment for 1 week. *  Please give a list of your current medications to your Primary Care Provider. *  Please update this list whenever your medications are discontinued, doses are      changed, or new medications (including over-the-counter products) are added. *  Please carry medication information at all times in case of emergency situations. These are general instructions for a healthy lifestyle:    No smoking/ No tobacco products/ Avoid exposure to second hand smoke  Surgeon General's Warning:  Quitting smoking now greatly reduces serious risk to your health.     Obesity, smoking, and sedentary lifestyle greatly increases your risk for illness    A healthy diet, regular physical exercise & weight monitoring are important for maintaining a healthy lifestyle    You may

## 2023-12-01 NOTE — PROGRESS NOTES
Reviewed discharge instructions with patient. Patient verbalized understanding.  Pt discharged home with

## 2023-12-01 NOTE — PROGRESS NOTES
1930hrs:  Bedside and Verbal shift change report given to SHONA Salas (oncoming nurse) by Elian Chamberlain RN (offgoing nurse). Report included the following information Nurse Handoff Report, Index, ED Encounter Summary, Adult Overview, Surgery Report, Intake/Output, MAR, Recent Results, Med Rec Status, and Cardiac Rhythm AFIB/Aflutter/ST . Pt in bed, with visitor x2 present. No s/s distress noted. Pt's visitor, Eliane Call, inquiring about Home Health information that was written on the pt's white board. Eliane Call was provided with Home Health consult/referral information from  notes. She thanked the [place] [phone] for this update. RN informed that pt/family should receive additional information regarding the Home Health process once received from the [place].     0800hrs:  Bedside and Verbal shift change report given to Karen Patel RN (oncoming nurse) by Star Martinez RN (offgoing nurse). Report included the following information Nurse Handoff Report, Index, ED Encounter Summary, Adult Overview, Surgery Report, Intake/Output, MAR, Recent Results, Med Rec Status, and Cardiac Rhythm AFIB/Aflutter/ST. Pt inquired about taking PO Blood Thinner (Eliquis). Pt advised that med is scheduled for 2x/day.

## 2023-12-01 NOTE — PROGRESS NOTES
Recent Labs     11/29/23  0342 11/30/23  0538 12/01/23  0227   * 132* 133*   K 3.9 4.1 3.8    104 103   CO2 21 21 21   BUN 32* 35* 40*       No results found for: \"BNP\", \"BNPPOC\", \"BNPNT\"  Lab Results   Component Value Date/Time    INR 1.1 11/22/2023 08:57 AM       MICRO:  Results       Procedure Component Value Units Date/Time    Culture, Body Fluid [[id number]] Collected: 11/24/23 1555    Order Status: Completed Specimen: Body Fluid from Thoracentesis Updated: 11/28/23 0833     Special Requests NO SPECIAL REQUESTS        Gram stain RARE WBCS SEEN         NO ORGANISMS SEEN        Culture       NO GROWTH 4 DAYS Culture performed on Unspun Fluid          Culture, Body Fluid [[id number]] Collected: 11/22/23 1530    Order Status: Completed Specimen: Body Fluid from Thoracentesis Updated: 11/26/23 0948     Special Requests NO SPECIAL REQUESTS        Gram stain RARE WBCS SEEN         NO ORGANISMS SEEN        Culture NO GROWTH 4 DAYS       COVID-19 & Influenza Combo [[id number]] Collected: 11/22/23 1014    Order Status: Completed Specimen: Nasopharyngeal Updated: 11/22/23 1059     SARS-CoV-2, PCR Not detected        Comment: Not Detected results do not preclude SARS-CoV-2 infection and should not be used as the sole basis for patient management decisions. Results must be combined with clinical observations, patient history, and epidemiological information. Rapid Influenza A By PCR Not detected        Rapid Influenza B By PCR Not detected        Comment: Testing was performed using ingrid Shi SARS-CoV-2 and Influenza A/B nucleic acid assay. This test is a multiplex Real-Time Reverse Transcriptase Polymerase Chain Reaction (RT-PCR) based in vitro diagnostic test intended for the qualitative detection of nucleic acids from SARS-CoV-2, Influenza A, and Influenza B in nasopharyngeal for use under the FDA's Emergency Use Authorization(EAU) only.      Fact sheet for Patients: evaluation and management plans during this consultation. More than 50% of time was spent in counseling and coordination of care including review of data and discussion with other team members.       Zeinab Perez MD  12/01/23  Pulmonary, Critical Care Medicine  [facility]

## 2023-12-01 NOTE — PROGRESS NOTES
regular rhythm  Respiratory: respirations not labored ( chest tube d/c'd)   Gastrointestinal: soft   Skin: warm, dry  Neurologic: alert and oriented x 3  Affect flat        HISTORY:     Principal Problem (Resolved):    Hx of pulmonary embolus  Active Problems:    Hypoxia    Acute pulmonary thromboembolism (HCC)    Pleural effusion on right    CKD (chronic kidney disease) stage 4, GFR 15-29 ml/min (HCC)    Hypertension    Mass of right lung    Pleural effusion    Acute hypoxic respiratory failure (HCC)    Chronic heart failure with preserved ejection fraction (HCC)    Moderate pulmonary hypertension (HCC)    Elevated troponin    Postprocedural pneumothorax    Metastatic adenocarcinoma (HCC)    Goals of care, counseling/discussion    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 No pertinent family history. History reviewed, no pertinent family history.   Social History     Tobacco Use    Smoking status: Never    Smokeless tobacco: Never   Substance Use Topics    Alcohol use: Not on file     No Known Allergies   Current Facility-Administered Medications   Medication Dose Route Frequency    apixaban (ELIQUIS) tablet 5 mg  5 mg Oral BID    carvedilol (COREG) tablet 6.25 mg  6.25 mg Oral BID WC    amLODIPine (NORVASC) tablet 5 mg  5 mg Oral Daily    sodium chloride flush 0.9 % injection 5-40 mL  5-40 mL IntraVENous 2 times per day    sodium chloride flush 0.9 % injection 5-40 mL  5-40 mL IntraVENous PRN    0.9 % sodium chloride infusion   IntraVENous PRN ondansetron (ZOFRAN-ODT) disintegrating tablet 4 mg  4 mg Oral Q8H PRN    Or    ondansetron (ZOFRAN) injection 4 mg  4 mg IntraVENous Q6H PRN    polyethylene glycol (GLYCOLAX) packet 17 g  17 g Oral Daily PRN    acetaminophen (TYLENOL) tablet 650 mg  650 mg Oral Q6H PRN    Or    acetaminophen (TYLENOL) suppository 650 mg  650 mg Rectal Q6H PRN    melatonin tablet 3 mg  3 mg Oral Nightly    famotidine (PEPCID) tablet 10 mg  10 mg Oral Daily        LAB AND IMAGING FINDINGS:     Lab Results   Component Value Date/Time    WBC 13.9 12/01/2023 02:27 AM    HGB 14.8 12/01/2023 02:27 AM     12/01/2023 02:27 AM     Lab Results   Component Value Date/Time     12/01/2023 02:27 AM    K 3.8 12/01/2023 02:27 AM     12/01/2023 02:27 AM    CO2 21 12/01/2023 02:27 AM    BUN 40 12/01/2023 02:27 AM    MG 1.9 11/24/2023 06:15 AM    PHOS 2.4 11/24/2023 06:15 AM      Lab Results   Component Value Date/Time    GLOB 4.8 11/22/2023 08:57 AM     Lab Results   Component Value Date/Time    INR 1.1 11/22/2023 08:57 AM    APTT 56.0 11/30/2023 01:34 PM      No results found for: \"IRON\", \"TIBC\", \"IBCT\", \"FERR\"   No results found for: \"PH\", \"PCO2\", \"PO2\"  No components found for: \"GLPOC\"   No results found for: \"CPK\", \"CKMB\", \"TROPONINI\"           Total time: 35 minutes

## 2023-12-12 DIAGNOSIS — C34.11 PRIMARY NON-SMALL CELL CARCINOMA OF UPPER LOBE OF RIGHT LUNG (HCC): Primary | ICD-10-CM

## 2023-12-15 ENCOUNTER — HOSPITAL ENCOUNTER (OUTPATIENT)
Facility: HOSPITAL | Age: 73
Discharge: HOME | End: 2023-12-15
Attending: INTERNAL MEDICINE | Admitting: RADIOLOGY
Payer: MEDICARE

## 2023-12-15 VITALS
HEART RATE: 76 BPM | HEIGHT: 70 IN | BODY MASS INDEX: 21.47 KG/M2 | SYSTOLIC BLOOD PRESSURE: 123 MMHG | OXYGEN SATURATION: 99 % | RESPIRATION RATE: 16 BRPM | DIASTOLIC BLOOD PRESSURE: 77 MMHG | WEIGHT: 150 LBS

## 2023-12-15 DIAGNOSIS — C34.11 PRIMARY NON-SMALL CELL CARCINOMA OF UPPER LOBE OF RIGHT LUNG (HCC): ICD-10-CM

## 2023-12-15 LAB
ANION GAP SERPL CALC-SCNC: 6 MMOL/L (ref 3–18)
APTT PPP: 30.6 SEC (ref 23–36.4)
BASOPHILS # BLD: 0.1 K/UL (ref 0–0.1)
BASOPHILS NFR BLD: 1 % (ref 0–2)
BUN SERPL-MCNC: 30 MG/DL (ref 7–18)
BUN/CREAT SERPL: 16 (ref 12–20)
CALCIUM SERPL-MCNC: 9.8 MG/DL (ref 8.5–10.1)
CHLORIDE SERPL-SCNC: 105 MMOL/L (ref 100–111)
CO2 SERPL-SCNC: 26 MMOL/L (ref 21–32)
CREAT SERPL-MCNC: 1.91 MG/DL (ref 0.6–1.3)
DIFFERENTIAL METHOD BLD: ABNORMAL
EOSINOPHIL # BLD: 0.1 K/UL (ref 0–0.4)
EOSINOPHIL NFR BLD: 1 % (ref 0–5)
ERYTHROCYTE [DISTWIDTH] IN BLOOD BY AUTOMATED COUNT: 12.7 % (ref 11.6–14.5)
GLUCOSE SERPL-MCNC: 129 MG/DL (ref 74–99)
HCT VFR BLD AUTO: 47.7 % (ref 36–48)
HGB BLD-MCNC: 14.9 G/DL (ref 13–16)
IMM GRANULOCYTES # BLD AUTO: 0.1 K/UL (ref 0–0.04)
IMM GRANULOCYTES NFR BLD AUTO: 1 % (ref 0–0.5)
INR PPP: 1.1 (ref 0.9–1.1)
LYMPHOCYTES # BLD: 1.1 K/UL (ref 0.9–3.6)
LYMPHOCYTES NFR BLD: 9 % (ref 21–52)
MCH RBC QN AUTO: 26.9 PG (ref 24–34)
MCHC RBC AUTO-ENTMCNC: 31.2 G/DL (ref 31–37)
MCV RBC AUTO: 86.1 FL (ref 78–100)
MONOCYTES # BLD: 0.8 K/UL (ref 0.05–1.2)
MONOCYTES NFR BLD: 7 % (ref 3–10)
NEUTS SEG # BLD: 10.4 K/UL (ref 1.8–8)
NEUTS SEG NFR BLD: 82 % (ref 40–73)
NRBC # BLD: 0 K/UL (ref 0–0.01)
NRBC BLD-RTO: 0 PER 100 WBC
PLATELET # BLD AUTO: 451 K/UL (ref 135–420)
PMV BLD AUTO: 9.4 FL (ref 9.2–11.8)
POTASSIUM SERPL-SCNC: 4.8 MMOL/L (ref 3.5–5.5)
PROTHROMBIN TIME: 14.7 SEC (ref 11.9–14.7)
RBC # BLD AUTO: 5.54 M/UL (ref 4.35–5.65)
SODIUM SERPL-SCNC: 137 MMOL/L (ref 136–145)
WBC # BLD AUTO: 12.7 K/UL (ref 4.6–13.2)

## 2023-12-15 PROCEDURE — 2580000003 HC RX 258: Performed by: RADIOLOGY

## 2023-12-15 PROCEDURE — 36561 INSERT TUNNELED CV CATH: CPT

## 2023-12-15 PROCEDURE — 85730 THROMBOPLASTIN TIME PARTIAL: CPT

## 2023-12-15 PROCEDURE — 6360000002 HC RX W HCPCS: Performed by: RADIOLOGY

## 2023-12-15 PROCEDURE — 85610 PROTHROMBIN TIME: CPT

## 2023-12-15 PROCEDURE — 2500000003 HC RX 250 WO HCPCS: Performed by: RADIOLOGY

## 2023-12-15 PROCEDURE — 85025 COMPLETE CBC W/AUTO DIFF WBC: CPT

## 2023-12-15 PROCEDURE — 80048 BASIC METABOLIC PNL TOTAL CA: CPT

## 2023-12-15 RX ORDER — MIDAZOLAM HYDROCHLORIDE 2 MG/2ML
INJECTION, SOLUTION INTRAMUSCULAR; INTRAVENOUS PRN
Status: COMPLETED | OUTPATIENT
Start: 2023-12-15 | End: 2023-12-15

## 2023-12-15 RX ORDER — ONDANSETRON 2 MG/ML
4 INJECTION INTRAMUSCULAR; INTRAVENOUS EVERY 8 HOURS PRN
Status: DISCONTINUED | OUTPATIENT
Start: 2023-12-15 | End: 2023-12-15 | Stop reason: HOSPADM

## 2023-12-15 RX ORDER — SODIUM CHLORIDE 9 MG/ML
INJECTION, SOLUTION INTRAVENOUS CONTINUOUS
Status: DISCONTINUED | OUTPATIENT
Start: 2023-12-15 | End: 2023-12-15 | Stop reason: HOSPADM

## 2023-12-15 RX ORDER — ACETAMINOPHEN 500 MG
500 TABLET ORAL EVERY 4 HOURS PRN
Status: DISCONTINUED | OUTPATIENT
Start: 2023-12-15 | End: 2023-12-15 | Stop reason: HOSPADM

## 2023-12-15 RX ORDER — LIDOCAINE HYDROCHLORIDE AND EPINEPHRINE BITARTRATE 20; .01 MG/ML; MG/ML
INJECTION, SOLUTION SUBCUTANEOUS PRN
Status: COMPLETED | OUTPATIENT
Start: 2023-12-15 | End: 2023-12-15

## 2023-12-15 RX ORDER — FENTANYL CITRATE 50 UG/ML
INJECTION, SOLUTION INTRAMUSCULAR; INTRAVENOUS PRN
Status: COMPLETED | OUTPATIENT
Start: 2023-12-15 | End: 2023-12-15

## 2023-12-15 RX ADMIN — MIDAZOLAM HYDROCHLORIDE 0.5 MG: 1 INJECTION, SOLUTION INTRAMUSCULAR; INTRAVENOUS at 13:32

## 2023-12-15 RX ADMIN — LIDOCAINE HYDROCHLORIDE,EPINEPHRINE BITARTRATE 10 ML: 20; .01 INJECTION, SOLUTION INFILTRATION; PERINEURAL at 12:58

## 2023-12-15 RX ADMIN — FENTANYL CITRATE 25 MCG: 50 INJECTION INTRAMUSCULAR; INTRAVENOUS at 12:58

## 2023-12-15 RX ADMIN — LIDOCAINE HYDROCHLORIDE,EPINEPHRINE BITARTRATE 10 ML: 20; .01 INJECTION, SOLUTION INFILTRATION; PERINEURAL at 13:15

## 2023-12-15 RX ADMIN — MIDAZOLAM HYDROCHLORIDE 1 MG: 1 INJECTION, SOLUTION INTRAMUSCULAR; INTRAVENOUS at 12:49

## 2023-12-15 RX ADMIN — FENTANYL CITRATE 50 MCG: 50 INJECTION INTRAMUSCULAR; INTRAVENOUS at 12:49

## 2023-12-15 RX ADMIN — WATER 2000 MG: 1 INJECTION, SOLUTION INTRAMUSCULAR; INTRAVENOUS; SUBCUTANEOUS at 12:58

## 2023-12-15 RX ADMIN — FENTANYL CITRATE 25 MCG: 50 INJECTION INTRAMUSCULAR; INTRAVENOUS at 13:32

## 2023-12-15 RX ADMIN — MIDAZOLAM HYDROCHLORIDE 0.5 MG: 1 INJECTION, SOLUTION INTRAMUSCULAR; INTRAVENOUS at 12:58

## 2023-12-15 RX ADMIN — LIDOCAINE HYDROCHLORIDE,EPINEPHRINE BITARTRATE 7 ML: 20; .01 INJECTION, SOLUTION INFILTRATION; PERINEURAL at 13:25

## 2023-12-15 ASSESSMENT — PAIN - FUNCTIONAL ASSESSMENT: PAIN_FUNCTIONAL_ASSESSMENT: NONE - DENIES PAIN

## 2023-12-15 NOTE — PROCEDURES
RADIOLOGY POST PROCEDURE NOTE     December 15, 2023       1:43 PM     Preoperative Diagnosis:   Lung cancer    Postoperative Diagnosis:  Same. :  Dr. Sis Davenport    Resident:  Varsha Wooten MD.    Type of Anesthesia: 1% plain lidocaine, moderate sedation    Procedure/Description:  Mediport placement    Findings:   Right IJ mediport placement. Estimated blood Loss:  Minimal    Specimen Removed:  None    Blood transfusions:  None. Implants:  None.     Complications: None    Condition: Stable    Discharge Plan:  discharge home     Catrachito Garcia MD

## 2023-12-15 NOTE — DISCHARGE INSTRUCTIONS
Can resume Eliquis in 48 hours (December 17, 2023)  DISCHARGE SUMMARY from Nurse    PATIENT INSTRUCTIONS:    After general anesthesia or intravenous sedation, for 24 hours or while taking prescription Narcotics:  Limit your activities  Do not drive and operate hazardous machinery  Do not make important personal or business decisions  Do  not drink alcoholic beverages  If you have not urinated within 8 hours after discharge, please contact your surgeon on call. Report the following to your surgeon:  Excessive pain, swelling, redness or odor of or around the surgical area  Temperature over 100.5  Nausea and vomiting lasting longer than 4 hours or if unable to take medications  Any signs of decreased circulation or nerve impairment to extremity: change in color, persistent  numbness, tingling, coldness or increase pain  Any questions    What to do at Home:  Recommended activity: activity as tolerated and no driving for today,    *  Please give a list of your current medications to your Primary Care Provider. *  Please update this list whenever your medications are discontinued, doses are      changed, or new medications (including over-the-counter products) are added. *  Please carry medication information at all times in case of emergency situations. These are general instructions for a healthy lifestyle:    No smoking/ No tobacco products/ Avoid exposure to second hand smoke  Surgeon General's Warning:  Quitting smoking now greatly reduces serious risk to your health.     Obesity, smoking, and sedentary lifestyle greatly increases your risk for illness    A healthy diet, regular physical exercise & weight monitoring are important for maintaining a healthy lifestyle    You may be retaining fluid if you have a history of heart failure or if you experience any of the following symptoms:  Weight gain of 3 pounds or more overnight or 5 pounds in a week, increased swelling in our hands or feet or shortness of breath while lying flat in bed. Please call your doctor as soon as you notice any of these symptoms; do not wait until your next office visit. The discharge information has been reviewed with the patient. The patient verbalized understanding. Discharge medications reviewed with the patient and appropriate educational materials and side effects teaching were provided.   ___________________________________________________________________________________________________________________________________

## 2023-12-15 NOTE — OR NURSING
TRANSFER - OUT REPORT:    Verbal report given to Mary Alonzo on Pablito Ward  being transferred to cath [facility] for routine post-op       Report consisted of patient's Situation, Background, Assessment and   Recommendations(SBAR). Information from the following report(s) Nurse Handoff Report was reviewed with the receiving nurse. Lines:   Single Lumen Implantable Port 12/15/23 Right Internal jugular (Active)       Peripheral IV Left;Proximal;Anterior Forearm (Active)        Opportunity for questions and clarification was provided.       Patient transported with:  Registered Nurse

## 2023-12-15 NOTE — PROGRESS NOTES
Cath holding summary   1045  Patient escorted to cath holding from waiting area ambulatory, alert and oriented x 4, voicing no complaints. Changed into gown and placed on monitor. NPO since MN. Lab results, med rec and H&P reviewed on chart. PIV x 1 inserted without difficulty. Cath holding summary:    1230: Verbal report given to [address] [place] [address] on [facility] being transferred to  for ordered procedure. Report consisted of patient's Situation, Background, Assessment and Recommendations (SBAR). Information from the following report(s) Nurse Handoff Report, MAR, and Pre Procedure Checklist was reviewed with the receiving nurse. Opportunity for questions and clarification was provided. 1350: Verbal report received from [place] on [facility] being received from IR for ordered procedure. Report consisted of patient's Situation, Background, Assessment and Recommendations (SBAR). Information from the following report(s) Nurse Handoff Report, Surgery Report, and Med Rec Status was reviewed with the receiving nurse. Pt A&O x4, no c/o pain. Opportunity for questions and clarification provided. Procedure: IR Procedure  Intervention: No  Site: Chest        1450: AVS Discharge instructions reviewed with patient, all questions answered. Patient verbalized understanding, copy given. Procedural site within normal limits, PIV removed. No hematoma or bleeding noted from procedural or IV site. A&Ox4 no c/o pain, discharged with support person in stable condition. Escorted out to vehicle for transport home.

## 2023-12-18 ENCOUNTER — OFFICE VISIT (OUTPATIENT)
Age: 73
End: 2023-12-18

## 2023-12-18 VITALS
RESPIRATION RATE: 18 BRPM | SYSTOLIC BLOOD PRESSURE: 123 MMHG | OXYGEN SATURATION: 98 % | BODY MASS INDEX: 20.87 KG/M2 | TEMPERATURE: 98.2 F | DIASTOLIC BLOOD PRESSURE: 66 MMHG | HEART RATE: 82 BPM | HEIGHT: 70 IN | WEIGHT: 145.8 LBS

## 2023-12-18 DIAGNOSIS — C38.4 MALIGNANT NEOPLASM OF PLEURA (HCC): ICD-10-CM

## 2023-12-18 DIAGNOSIS — J90 PLEURAL EFFUSION ON RIGHT: ICD-10-CM

## 2023-12-18 DIAGNOSIS — C79.9 METASTATIC ADENOCARCINOMA (HCC): Primary | ICD-10-CM

## 2023-12-18 DIAGNOSIS — J91.0 MALIGNANT PLEURAL EFFUSION: ICD-10-CM

## 2023-12-18 DIAGNOSIS — I26.99 ACUTE PULMONARY THROMBOEMBOLISM (HCC): ICD-10-CM

## 2023-12-18 DIAGNOSIS — R06.09 DYSPNEA ON EXERTION: ICD-10-CM

## 2023-12-18 RX ORDER — DEXAMETHASONE 4 MG/1
4 TABLET ORAL
Status: ON HOLD | COMMUNITY
Start: 2023-12-06

## 2023-12-18 ASSESSMENT — PATIENT HEALTH QUESTIONNAIRE - PHQ9
SUM OF ALL RESPONSES TO PHQ QUESTIONS 1-9: 0
SUM OF ALL RESPONSES TO PHQ9 QUESTIONS 1 & 2: 0
2. FEELING DOWN, DEPRESSED OR HOPELESS: 0
SUM OF ALL RESPONSES TO PHQ QUESTIONS 1-9: 0
1. LITTLE INTEREST OR PLEASURE IN DOING THINGS: 0

## 2023-12-18 NOTE — PROGRESS NOTES
Chad JACOB PalmerSorto presents today for   Chief Complaint   Patient presents with    Follow-Up from Hospital      for pulm. embolus       Is someone accompanying this pt? family    Is the patient using any DME equipment during OV? no    -DME Company n/a    Depression Screenin/18/2023     4:09 PM   PHQ-9 Questionaire   Little interest or pleasure in doing things 0   Feeling down, depressed, or hopeless 0   PHQ-9 Total Score 0       Learning Needs Questionnaire:     Who is the primary learner? Patient    What is the preferred language for health care of the primary learner? ENGLISH    How does the primary learner prefer to learn new concepts? DEMONSTRATION    Answered By patient    Relationship to Learner SELF          Fall Risk:         2023     4:09 PM   Fall Risk   2 or more falls in past year? no   Fall with injury in past year? no        Abuse Screening:          No data to display                  Coordination of Care:    1. Have you been to the ER, urgent care clinic since your last visit? Hospitalized since your last visit? 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 Have you seen or consulted any other health care providers outside of the [facility] since your last visit? Include any pap 
immunochemistry positive for CK7/TTF-1/Napsin-A, negative for p63/calretinin. MRI miri and CT a/p with evidence of metastasis. Following with Heme/Onc - Dr. Fowler. Never smoker. (+)Family history of lung cancer in mother.  Acute Pulmonary embolism: Dx 11/2023. Provoked by ongoing malignancy.  Pleural effusion, metastatic: s/p Rt thoracentesis. +Cytology for Adenocarcinoma.  Pulmonary hypertension: moderate per last TTE. 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  H/o Rt Pneumothorax: occurred post-thoracentesis but improved with chest tube placement.  H/o Pericardial effusion: no tamponade on TTE.  Comorbid conditions: HFpEF, Gouty arthritis, CKD, HTN       RECOMMENDATIONS:   Obtain complete PFT and SMW  Obtain PET/CT scan  He denies respiratory symptoms. If pleural effusion re-occurs, may be a candidate for PleurX cath placement  Continue Eliquis 5 mg BID; will require lifelong anticoagulation  Pulmonary rehab - will discuss on next visit  Vaccination: recommend all age-appropriate immunizations  Follow up - 3 months & or sooner should new symptoms or problems arise.     Education:  Inhaler techniques, MDI/spacers, nebulizers use, goal setting, monitoring  Reducing anxiety, energy conservation tips, exercise, medications and Nutrition      Health maintenance screens deferred to Primary care provider.     Weston Arora, DO  12/18/2023  Pulmonary, Critical Care Medicine  [facility]

## 2023-12-18 NOTE — PATIENT INSTRUCTIONS
What is the plan?  -Complete lung function test and walk test  -Complete PET/CT  -Continue Eliquis as prescribed.  -Continue to follow-up with all of your other physicians as well

## 2023-12-25 PROBLEM — R79.89 ELEVATED TROPONIN: Status: RESOLVED | Noted: 2023-11-25 | Resolved: 2023-12-25

## 2024-01-01 ENCOUNTER — APPOINTMENT (OUTPATIENT)
Facility: HOSPITAL | Age: 74
End: 2024-01-01
Payer: MEDICARE

## 2024-01-01 ENCOUNTER — HOSPITAL ENCOUNTER (INPATIENT)
Facility: HOSPITAL | Age: 74
LOS: 10 days | End: 2024-01-11
Attending: STUDENT IN AN ORGANIZED HEALTH CARE EDUCATION/TRAINING PROGRAM | Admitting: INTERNAL MEDICINE
Payer: MEDICARE

## 2024-01-01 DIAGNOSIS — R65.20 SEVERE SEPSIS (HCC): Primary | ICD-10-CM

## 2024-01-01 DIAGNOSIS — I48.0 PAROXYSMAL A-FIB (HCC): ICD-10-CM

## 2024-01-01 DIAGNOSIS — A41.9 SEVERE SEPSIS (HCC): Primary | ICD-10-CM

## 2024-01-01 DIAGNOSIS — I48.0 PAROXYSMAL ATRIAL FIBRILLATION (HCC): ICD-10-CM

## 2024-01-01 LAB
ABO + RH BLD: NORMAL
ALBUMIN SERPL-MCNC: 1.7 G/DL (ref 3.4–5)
ALBUMIN SERPL-MCNC: 2.2 G/DL (ref 3.4–5)
ALBUMIN/GLOB SERPL: 0.5 (ref 0.8–1.7)
ALP SERPL-CCNC: 62 U/L (ref 45–117)
ALT SERPL-CCNC: 26 U/L (ref 16–61)
AMPHET UR QL SCN: NEGATIVE
ANION GAP SERPL CALC-SCNC: 10 MMOL/L (ref 3–18)
ANION GAP SERPL CALC-SCNC: 13 MMOL/L (ref 3–18)
APPEARANCE UR: CLEAR
APTT PPP: 45.9 SEC (ref 23–36.4)
AST SERPL-CCNC: 18 U/L (ref 10–38)
BACTERIA URNS QL MICRO: ABNORMAL /HPF
BARBITURATES UR QL SCN: NEGATIVE
BASOPHILS # BLD: 0 K/UL (ref 0–0.1)
BASOPHILS NFR BLD: 0 % (ref 0–2)
BENZODIAZ UR QL: NEGATIVE
BILIRUB SERPL-MCNC: 1.1 MG/DL (ref 0.2–1)
BILIRUB UR QL: NEGATIVE
BLOOD GROUP ANTIBODIES SERPL: NORMAL
BUN SERPL-MCNC: 137 MG/DL (ref 7–18)
BUN SERPL-MCNC: 148 MG/DL (ref 7–18)
BUN/CREAT SERPL: 41 (ref 12–20)
BUN/CREAT SERPL: 45 (ref 12–20)
CALCIUM SERPL-MCNC: 7.8 MG/DL (ref 8.5–10.1)
CALCIUM SERPL-MCNC: 8.8 MG/DL (ref 8.5–10.1)
CANNABINOIDS UR QL SCN: NEGATIVE
CHLORIDE SERPL-SCNC: 113 MMOL/L (ref 100–111)
CHLORIDE SERPL-SCNC: 120 MMOL/L (ref 100–111)
CHP ED QC CHECK: NORMAL
CHP ED QC CHECK: NORMAL
CK SERPL-CCNC: 27 U/L (ref 39–308)
CO2 SERPL-SCNC: 16 MMOL/L (ref 21–32)
CO2 SERPL-SCNC: 16 MMOL/L (ref 21–32)
COCAINE UR QL SCN: NEGATIVE
COLOR UR: YELLOW
CREAT SERPL-MCNC: 3.07 MG/DL (ref 0.6–1.3)
CREAT SERPL-MCNC: 3.64 MG/DL (ref 0.6–1.3)
D DIMER PPP FEU-MCNC: 1.25 UG/ML(FEU)
DIFFERENTIAL METHOD BLD: ABNORMAL
EKG ATRIAL RATE: 330 BPM
EKG DIAGNOSIS: NORMAL
EKG DIAGNOSIS: NORMAL
EKG Q-T INTERVAL: 296 MS
EKG Q-T INTERVAL: 302 MS
EKG QRS DURATION: 72 MS
EKG QRS DURATION: 74 MS
EKG QTC CALCULATION (BAZETT): 491 MS
EKG QTC CALCULATION (BAZETT): 500 MS
EKG R AXIS: 60 DEGREES
EKG R AXIS: 61 DEGREES
EKG T AXIS: 42 DEGREES
EKG T AXIS: 58 DEGREES
EKG VENTRICULAR RATE: 165 BPM
EKG VENTRICULAR RATE: 166 BPM
EOSINOPHIL # BLD: 0 K/UL (ref 0–0.4)
EOSINOPHIL NFR BLD: 0 % (ref 0–5)
EPITH CASTS URNS QL MICRO: ABNORMAL /LPF (ref 0–5)
ERYTHROCYTE [DISTWIDTH] IN BLOOD BY AUTOMATED COUNT: 13.2 % (ref 11.6–14.5)
ETHANOL SERPL-MCNC: <3 MG/DL (ref 0–3)
FLUAV RNA SPEC QL NAA+PROBE: NOT DETECTED
FLUBV RNA SPEC QL NAA+PROBE: NOT DETECTED
GLOBULIN SER CALC-MCNC: 4.1 G/DL (ref 2–4)
GLUCOSE BLD STRIP.AUTO-MCNC: 88 MG/DL (ref 70–110)
GLUCOSE BLD-MCNC: 142 MG/DL
GLUCOSE BLD-MCNC: 88 MG/DL
GLUCOSE SERPL-MCNC: 184 MG/DL (ref 74–99)
GLUCOSE SERPL-MCNC: 87 MG/DL (ref 74–99)
GLUCOSE UR STRIP.AUTO-MCNC: NEGATIVE MG/DL
HCT VFR BLD AUTO: 33.3 % (ref 36–48)
HGB BLD-MCNC: 10.8 G/DL (ref 13–16)
HGB UR QL STRIP: ABNORMAL
IMM GRANULOCYTES # BLD AUTO: 0 K/UL
IMM GRANULOCYTES NFR BLD AUTO: 0 %
KETONES UR QL STRIP.AUTO: NEGATIVE MG/DL
LACTATE SERPL-SCNC: 1.7 MMOL/L (ref 0.4–2)
LACTATE SERPL-SCNC: 1.7 MMOL/L (ref 0.4–2)
LEUKOCYTE ESTERASE UR QL STRIP.AUTO: ABNORMAL
LYMPHOCYTES # BLD: 0.6 K/UL (ref 0.9–3.6)
LYMPHOCYTES NFR BLD: 50 % (ref 21–52)
Lab: NORMAL
MCH RBC QN AUTO: 26.7 PG (ref 24–34)
MCHC RBC AUTO-ENTMCNC: 32.4 G/DL (ref 31–37)
MCV RBC AUTO: 82.4 FL (ref 78–100)
METHADONE UR QL: NEGATIVE
MONOCYTES # BLD: 0.1 K/UL (ref 0.05–1.2)
MONOCYTES NFR BLD: 4 % (ref 3–10)
NEUTS BAND NFR BLD MANUAL: 2 % (ref 0–5)
NEUTS SEG # BLD: 0.6 K/UL (ref 1.8–8)
NEUTS SEG NFR BLD: 44 % (ref 40–73)
NITRITE UR QL STRIP.AUTO: NEGATIVE
NRBC # BLD: 0 K/UL (ref 0–0.01)
NRBC BLD-RTO: 0 PER 100 WBC
OPIATES UR QL: NEGATIVE
PCP UR QL: NEGATIVE
PH UR STRIP: 5 (ref 5–8)
PHOSPHATE SERPL-MCNC: 3.6 MG/DL (ref 2.5–4.9)
PLATELET # BLD AUTO: 42 K/UL (ref 135–420)
PLATELET COMMENT: ABNORMAL
PMV BLD AUTO: 12.8 FL (ref 9.2–11.8)
POTASSIUM SERPL-SCNC: 4.8 MMOL/L (ref 3.5–5.5)
POTASSIUM SERPL-SCNC: 6.1 MMOL/L (ref 3.5–5.5)
PROCALCITONIN SERPL-MCNC: 13.88 NG/ML
PROT SERPL-MCNC: 6.3 G/DL (ref 6.4–8.2)
PROT UR STRIP-MCNC: ABNORMAL MG/DL
RBC # BLD AUTO: 4.04 M/UL (ref 4.35–5.65)
RBC #/AREA URNS HPF: ABNORMAL /HPF (ref 0–5)
RBC MORPH BLD: ABNORMAL
SARS-COV-2 RNA RESP QL NAA+PROBE: NOT DETECTED
SODIUM SERPL-SCNC: 142 MMOL/L (ref 136–145)
SODIUM SERPL-SCNC: 146 MMOL/L (ref 136–145)
SP GR UR REFRACTOMETRY: 1.01 (ref 1–1.03)
SPECIMEN EXP DATE BLD: NORMAL
TOTAL CELLS COUNTED SPEC: 50
TROPONIN I SERPL HS-MCNC: 105 NG/L (ref 0–78)
TROPONIN I SERPL HS-MCNC: 79 NG/L (ref 0–78)
TROPONIN I SERPL HS-MCNC: 97 NG/L (ref 0–78)
TSH SERPL DL<=0.05 MIU/L-ACNC: 0.31 UIU/ML (ref 0.36–3.74)
UROBILINOGEN UR QL STRIP.AUTO: 1 EU/DL (ref 0.2–1)
WBC # BLD AUTO: 1.3 K/UL (ref 4.6–13.2)
WBC MORPH BLD: ABNORMAL
WBC URNS QL MICRO: ABNORMAL /HPF (ref 0–4)

## 2024-01-01 PROCEDURE — 99291 CRITICAL CARE FIRST HOUR: CPT | Performed by: INTERNAL MEDICINE

## 2024-01-01 PROCEDURE — 82550 ASSAY OF CK (CPK): CPT

## 2024-01-01 PROCEDURE — 2500000003 HC RX 250 WO HCPCS: Performed by: PHYSICIAN ASSISTANT

## 2024-01-01 PROCEDURE — 96375 TX/PRO/DX INJ NEW DRUG ADDON: CPT

## 2024-01-01 PROCEDURE — 99285 EMERGENCY DEPT VISIT HI MDM: CPT

## 2024-01-01 PROCEDURE — 93010 ELECTROCARDIOGRAM REPORT: CPT | Performed by: INTERNAL MEDICINE

## 2024-01-01 PROCEDURE — 2580000003 HC RX 258

## 2024-01-01 PROCEDURE — 71045 X-RAY EXAM CHEST 1 VIEW: CPT

## 2024-01-01 PROCEDURE — 81001 URINALYSIS AUTO W/SCOPE: CPT

## 2024-01-01 PROCEDURE — 93005 ELECTROCARDIOGRAM TRACING: CPT | Performed by: STUDENT IN AN ORGANIZED HEALTH CARE EDUCATION/TRAINING PROGRAM

## 2024-01-01 PROCEDURE — 6370000000 HC RX 637 (ALT 250 FOR IP): Performed by: STUDENT IN AN ORGANIZED HEALTH CARE EDUCATION/TRAINING PROGRAM

## 2024-01-01 PROCEDURE — 93005 ELECTROCARDIOGRAM TRACING: CPT

## 2024-01-01 PROCEDURE — 96365 THER/PROPH/DIAG IV INF INIT: CPT

## 2024-01-01 PROCEDURE — 6360000002 HC RX W HCPCS: Performed by: STUDENT IN AN ORGANIZED HEALTH CARE EDUCATION/TRAINING PROGRAM

## 2024-01-01 PROCEDURE — 82962 GLUCOSE BLOOD TEST: CPT

## 2024-01-01 PROCEDURE — 84484 ASSAY OF TROPONIN QUANT: CPT

## 2024-01-01 PROCEDURE — 80069 RENAL FUNCTION PANEL: CPT

## 2024-01-01 PROCEDURE — 85379 FIBRIN DEGRADATION QUANT: CPT

## 2024-01-01 PROCEDURE — 85027 COMPLETE CBC AUTOMATED: CPT

## 2024-01-01 PROCEDURE — APPSS15 APP SPLIT SHARED TIME 0-15 MINUTES: Performed by: PHYSICIAN ASSISTANT

## 2024-01-01 PROCEDURE — 83605 ASSAY OF LACTIC ACID: CPT

## 2024-01-01 PROCEDURE — 86901 BLOOD TYPING SEROLOGIC RH(D): CPT

## 2024-01-01 PROCEDURE — 84443 ASSAY THYROID STIM HORMONE: CPT

## 2024-01-01 PROCEDURE — 84145 PROCALCITONIN (PCT): CPT

## 2024-01-01 PROCEDURE — A4216 STERILE WATER/SALINE, 10 ML: HCPCS | Performed by: PHYSICIAN ASSISTANT

## 2024-01-01 PROCEDURE — 96366 THER/PROPH/DIAG IV INF ADDON: CPT

## 2024-01-01 PROCEDURE — 2580000003 HC RX 258: Performed by: PHYSICIAN ASSISTANT

## 2024-01-01 PROCEDURE — 5A2204Z RESTORATION OF CARDIAC RHYTHM, SINGLE: ICD-10-PCS | Performed by: STUDENT IN AN ORGANIZED HEALTH CARE EDUCATION/TRAINING PROGRAM

## 2024-01-01 PROCEDURE — 82077 ASSAY SPEC XCP UR&BREATH IA: CPT

## 2024-01-01 PROCEDURE — 80053 COMPREHEN METABOLIC PANEL: CPT

## 2024-01-01 PROCEDURE — 6360000002 HC RX W HCPCS: Performed by: PHYSICIAN ASSISTANT

## 2024-01-01 PROCEDURE — 86850 RBC ANTIBODY SCREEN: CPT

## 2024-01-01 PROCEDURE — 2500000003 HC RX 250 WO HCPCS

## 2024-01-01 PROCEDURE — 80307 DRUG TEST PRSMV CHEM ANLYZR: CPT

## 2024-01-01 PROCEDURE — 85730 THROMBOPLASTIN TIME PARTIAL: CPT

## 2024-01-01 PROCEDURE — 86900 BLOOD TYPING SEROLOGIC ABO: CPT

## 2024-01-01 PROCEDURE — 87636 SARSCOV2 & INF A&B AMP PRB: CPT

## 2024-01-01 PROCEDURE — 87040 BLOOD CULTURE FOR BACTERIA: CPT

## 2024-01-01 PROCEDURE — 1100000000 HC RM PRIVATE

## 2024-01-01 PROCEDURE — 2580000003 HC RX 258: Performed by: STUDENT IN AN ORGANIZED HEALTH CARE EDUCATION/TRAINING PROGRAM

## 2024-01-01 PROCEDURE — 92960 CARDIOVERSION ELECTRIC EXT: CPT

## 2024-01-01 PROCEDURE — 6360000002 HC RX W HCPCS

## 2024-01-01 RX ORDER — FOLIC ACID 1 MG/1
1 TABLET ORAL
COMMUNITY
Start: 2023-12-06

## 2024-01-01 RX ORDER — 0.9 % SODIUM CHLORIDE 0.9 %
30 INTRAVENOUS SOLUTION INTRAVENOUS ONCE
Status: COMPLETED | OUTPATIENT
Start: 2024-01-01 | End: 2024-01-01

## 2024-01-01 RX ORDER — DEXTROSE MONOHYDRATE 100 MG/ML
INJECTION, SOLUTION INTRAVENOUS CONTINUOUS PRN
Status: DISCONTINUED | OUTPATIENT
Start: 2024-01-01 | End: 2024-01-08

## 2024-01-01 RX ORDER — OLANZAPINE 2.5 MG/1
2.5 TABLET, FILM COATED ORAL
COMMUNITY
Start: 2023-12-07

## 2024-01-01 RX ORDER — ONDANSETRON HYDROCHLORIDE 8 MG/1
1 TABLET, FILM COATED ORAL
COMMUNITY
Start: 2023-12-06

## 2024-01-01 RX ORDER — CALCIUM GLUCONATE 94 MG/ML
1000 INJECTION, SOLUTION INTRAVENOUS ONCE
Status: COMPLETED | OUTPATIENT
Start: 2024-01-01 | End: 2024-01-01

## 2024-01-01 RX ORDER — HEPARIN SODIUM 1000 [USP'U]/ML
40 INJECTION, SOLUTION INTRAVENOUS; SUBCUTANEOUS PRN
Status: DISCONTINUED | OUTPATIENT
Start: 2024-01-01 | End: 2024-01-08

## 2024-01-01 RX ORDER — KETAMINE HCL 50MG/ML(1)
0.65 SYRINGE (ML) INTRAVENOUS ONCE
Status: DISCONTINUED | OUTPATIENT
Start: 2024-01-01 | End: 2024-01-03

## 2024-01-01 RX ORDER — TORSEMIDE 20 MG/1
20 TABLET ORAL DAILY
COMMUNITY

## 2024-01-01 RX ORDER — PALONOSETRON 0.05 MG/ML
0.25 INJECTION, SOLUTION INTRAVENOUS
COMMUNITY
Start: 2024-01-09

## 2024-01-01 RX ORDER — HEPARIN SODIUM 10000 [USP'U]/100ML
5-30 INJECTION, SOLUTION INTRAVENOUS CONTINUOUS
Status: DISCONTINUED | OUTPATIENT
Start: 2024-01-01 | End: 2024-01-08

## 2024-01-01 RX ORDER — KETAMINE HCL 50MG/ML(1)
0.65 SYRINGE (ML) INTRAVENOUS ONCE
Status: DISCONTINUED | OUTPATIENT
Start: 2024-01-01 | End: 2024-01-01

## 2024-01-01 RX ORDER — DRONABINOL 2.5 MG/1
1 CAPSULE ORAL
COMMUNITY
Start: 2023-12-28

## 2024-01-01 RX ORDER — PEMBROLIZUMAB 25 MG/ML
200 INJECTION, SOLUTION INTRAVENOUS
COMMUNITY
Start: 2024-01-09

## 2024-01-01 RX ORDER — SODIUM CHLORIDE, SODIUM LACTATE, POTASSIUM CHLORIDE, AND CALCIUM CHLORIDE .6; .31; .03; .02 G/100ML; G/100ML; G/100ML; G/100ML
1000 INJECTION, SOLUTION INTRAVENOUS ONCE
Status: COMPLETED | OUTPATIENT
Start: 2024-01-01 | End: 2024-01-02

## 2024-01-01 RX ORDER — 0.9 % SODIUM CHLORIDE 0.9 %
500 INTRAVENOUS SOLUTION INTRAVENOUS ONCE
Status: COMPLETED | OUTPATIENT
Start: 2024-01-01 | End: 2024-01-01

## 2024-01-01 RX ORDER — KETAMINE HYDROCHLORIDE 50 MG/ML
1 INJECTION, SOLUTION INTRAMUSCULAR; INTRAVENOUS
Status: DISCONTINUED | OUTPATIENT
Start: 2024-01-01 | End: 2024-01-01

## 2024-01-01 RX ORDER — HEPARIN SODIUM 1000 [USP'U]/ML
80 INJECTION, SOLUTION INTRAVENOUS; SUBCUTANEOUS PRN
Status: DISCONTINUED | OUTPATIENT
Start: 2024-01-01 | End: 2024-01-08

## 2024-01-01 RX ORDER — DIGOXIN 0.25 MG/ML
500 INJECTION INTRAMUSCULAR; INTRAVENOUS ONCE
Status: DISCONTINUED | OUTPATIENT
Start: 2024-01-01 | End: 2024-01-01

## 2024-01-01 RX ORDER — PEMETREXED 1 G/40ML
727 INJECTION, POWDER, LYOPHILIZED, FOR SOLUTION INTRAVENOUS
COMMUNITY
Start: 2024-01-09

## 2024-01-01 RX ORDER — 0.9 % SODIUM CHLORIDE 0.9 %
1000 INTRAVENOUS SOLUTION INTRAVENOUS ONCE
Status: COMPLETED | OUTPATIENT
Start: 2024-01-01 | End: 2024-01-02

## 2024-01-01 RX ORDER — APREPITANT 130 MG/18ML
130 INJECTION, EMULSION INTRAVENOUS
COMMUNITY
Start: 2024-01-09

## 2024-01-01 RX ORDER — KETAMINE HCL 50MG/ML(1)
0.65 SYRINGE (ML) INTRAVENOUS ONCE
Status: COMPLETED | OUTPATIENT
Start: 2024-01-01 | End: 2024-01-01

## 2024-01-01 RX ADMIN — SODIUM CHLORIDE 1000 ML: 9 INJECTION, SOLUTION INTRAVENOUS at 18:32

## 2024-01-01 RX ADMIN — SODIUM CHLORIDE, PRESERVATIVE FREE 20 MG: 5 INJECTION INTRAVENOUS at 23:40

## 2024-01-01 RX ADMIN — INSULIN HUMAN 5 UNITS: 100 INJECTION, SOLUTION PARENTERAL at 15:25

## 2024-01-01 RX ADMIN — HEPARIN SODIUM 18 UNITS/KG/HR: 10000 INJECTION, SOLUTION INTRAVENOUS at 19:17

## 2024-01-01 RX ADMIN — KETAMINE HYDROCHLORIDE 40 MG: 50 INJECTION INTRAMUSCULAR; INTRAVENOUS at 17:28

## 2024-01-01 RX ADMIN — PIPERACILLIN AND TAZOBACTAM 4500 MG: 4; .5 INJECTION, POWDER, FOR SOLUTION INTRAVENOUS at 13:30

## 2024-01-01 RX ADMIN — SODIUM CHLORIDE 1860 ML: 9 INJECTION, SOLUTION INTRAVENOUS at 12:35

## 2024-01-01 RX ADMIN — SODIUM CHLORIDE 500 ML: 9 INJECTION, SOLUTION INTRAVENOUS at 15:30

## 2024-01-01 RX ADMIN — DEXTROSE MONOHYDRATE: 100 INJECTION, SOLUTION INTRAVENOUS at 15:28

## 2024-01-01 RX ADMIN — AMIODARONE HYDROCHLORIDE 150 MG: 1.5 INJECTION, SOLUTION INTRAVENOUS at 18:05

## 2024-01-01 RX ADMIN — AMIODARONE HYDROCHLORIDE 1 MG/MIN: 1.8 INJECTION, SOLUTION INTRAVENOUS at 18:31

## 2024-01-01 RX ADMIN — Medication 40 MG: at 17:51

## 2024-01-01 RX ADMIN — CALCIUM GLUCONATE 1000 MG: 98 INJECTION, SOLUTION INTRAVENOUS at 15:28

## 2024-01-01 NOTE — PROGRESS NOTES
Discussed with team and reviewed EKG's, paroxysmal A.fib/flutter with RVR.  Can give amiodarone bolus then start drip.   Likely physiologic response from electrolyte abnormality with ARF.  Generous IVF, avoid digoxin.

## 2024-01-01 NOTE — ED PROVIDER NOTES
10% 125 mL (has no administration in time range)     Or   dextrose bolus 10% 250 mL (has no administration in time range)   glucagon injection 1 mg (has no administration in time range)   dextrose 10 % infusion ( IntraVENous New Bag 1/1/24 1528)   Ketamine (KETALAR) injection syringe 40 mg (has no administration in time range)   amiodarone (NEXTERONE) 150 mg in dextrose 5% 100 ml (150 mg IntraVENous New Bag 1/1/24 1805)     Followed by   amiodarone (NEXTERONE) 360 mg in dextrose 5% 200 ml (has no administration in time range)   lactated ringers bolus bolus 1,000 mL (has no administration in time range)   piperacillin-tazobactam (ZOSYN) 3,375 mg in sodium chloride 0.9 % 50 mL IVPB (mini-bag) (has no administration in time range)   piperacillin-tazobactam (ZOSYN) 4,500 mg in sodium chloride 0.9 % 100 mL IVPB (mini-bag) (has no administration in time range)   Ketamine (KETALAR) injection syringe 40 mg ( IntraVENous Canceled Entry 1/1/24 1812)   heparin (porcine) injection 4,960 Units (has no administration in time range)   heparin (porcine) injection 2,480 Units (has no administration in time range)   heparin 25,000 units in dextrose 5% 250 mL (premix) infusion (has no administration in time range)   sodium chloride 0.9 % bolus 1,000 mL (has no administration in time range)   sodium chloride 0.9 % bolus 1,860 mL (0 mLs IntraVENous Stopped 1/1/24 1309)   piperacillin-tazobactam (ZOSYN) 4,500 mg in sodium chloride 0.9 % 100 mL IVPB (mini-bag) (0 mg IntraVENous Stopped 1/1/24 1400)   calcium gluconate 10 % injection 1,000 mg (1,000 mg IntraVENous Given 1/1/24 1528)   sodium chloride 0.9 % bolus 500 mL (0 mLs IntraVENous Stopped 1/1/24 1731)     ED Course as of 01/01/24 1813 Mon Jan 01, 2024   1155 73-year-old male with history of PE, DVT on Eliquis, history of metastatic adenocarcinoma who presents ultimately status.  Per EMS patient was found down altered.  Urinating on his self.  Patient with systolic in the 200s.   Patient started on calcium gluconate, insulin for hyperkalemia protocol.  Creatinine 3.64, baseline 2. Lactic 2.5, repeat 1.7. Chest x-ray with no definitive right pneumothorax, similar right costophrenic angle blunting, left lung without consolidation, effusion, or pneumothorax. EKG with supraventricular tachycardia, troponin elevated to 105, repeat EKG grossly unchanged, trop downtrending to 79.  Patient unable to urinate, bladder scan and straight cath, UA with bacteria, elevated leukoesterase, urine drug screen negative.  COVID and flu negative.  Blood cultures pending.     Blood pressures continue to be low, maps hovering around 65.  Bedside ultrasound of the IVC shows an IVC measuring 1.2 cm collapsing to 0.8cm with inspiration, patient to receive more fluids secondary to inadequate resuscitation at this time.  500 cc bolus ordered.    Blood pressures improved, maps greater than 65 for an hour.  Patient's tachycardia initially improved, now back in the 160s, likely aflutter.  Will page cardiology for further recommendations.  Hospitalist Dr. Marcello klein declined and recommended discussion with ICU.    Given patient's elevated creatinine and GFR of 17, unable to receive contrast for the CTA chest.  D-dimer 1.25.  Age-adjusted D-dimer cutoff 0.75.  VQ scan ordered.    Given persistent tachycardia and hypotension, proceeded with synchronized cardioversion with ketamine sedation.  Attempt #1 unsuccessful, patient returned to a flutter.  Attempt #2 successful, patient in normal sinus rhythm.  Started the patient on an amiodarone drip.  ICU consulted for admission.  Dr. Arora excepted.    Daphney Francisco MD    Diagnosis and Disposition   DIAGNOSIS:   1. Severe sepsis (HCC)      DISPOSITION Admitted 01/01/2024 06:07:05 PM    PATIENT REFERRED TO:  No follow-up provider specified.    DISCHARGE MEDICATIONS:  New Prescriptions    No medications on file     DISCONTINUED MEDICATIONS:  Discontinued Medications    No

## 2024-01-01 NOTE — ED TRIAGE NOTES
Pt came from home via EMS, EMS said pt is having confusion , generalized weakness, today pt also has tachycardia ,. Baseline per pt's niece, pt is GCS15,ambulatory.

## 2024-01-01 NOTE — ED NOTES
Attending provider MD Leeroy stated the risk/benefits of Cardioversion to the patient, pt agreed and signed the consent, witnessed by this RN.     1707H-Timeout done by Dr. Umaña, Rosario RN, Nolan NAZARIO) resident, [facility] at bedside, Placed on Nasal cannula@3lpm ,suction set-up  1713H -Ketamine 30 mg (0.6ml) given by Dr. Umaña   1714H-Ketamine (0.2 ml) given by Dr. Umaña, RASS 0-1  1716Ketamine 0.2 ml given by Monica Umaña again RASS -2  1718-Shock @200J converted  ECG done

## 2024-01-01 NOTE — ED NOTES
This RN did a bladder scan at the time with a urine output of 195 ml. Straight cath done as verbal order by MD Nolan

## 2024-01-01 NOTE — ED NOTES
1744H-Time out by MD Nolan. Dr. Umaña ,This RN and Erik ([facility]) at bedside, informed pt to have another cardioversion   1745H-Ketamine 20 mg IV given by MD Nolan  1748H-Ketamine 10mg IV given by Nolan ACHARYA  1750H-Ketamine 5mgIV given by MD Nolan   1751H- Ketamine 5mg IV given by MD Nolan  1753H- Shock delivered @200 J    ECG done

## 2024-01-01 NOTE — H&P
confused during history. Denies pain. Asking for water. Unable to review complete ROS due to mentation.       Past Medical History:   Diagnosis Date    CKD (chronic kidney disease) stage 4, GFR 15-29 ml/min (HCC) 2023    Gout     HTN (hypertension)     Primary hypertension 2023        Past Surgical History:   Procedure Laterality Date    CT NEEDLE BIOPSY PLEURA PERCUTANEOUS  2023    CT NEEDLE BIOPSY PLEURA PERCUTANEOUS 2023 [facility] RAD CT    IR PORT PLACEMENT EQUAL OR GREATER THAN 5 YEARS  12/15/2023    IR PORT PLACEMENT EQUAL OR GREATER THAN 5 YEARS 12/15/2023 Jeffrey Sosa MD [facility] RAD ANGIO IR        Prior to Admission medications    Medication Sig Start Date End Date Taking? Authorizing Provider   dexamethasone (DECADRON) 4 MG tablet Take 1 tablet by mouth daily (with breakfast) 23   Sarah Saha MD   amLODIPine (NORVASC) 5 MG tablet Take 1 tablet by mouth daily 23   Sebastien Hernandez DO   carvedilol (COREG) 6.25 MG tablet Take 1 tablet by mouth 2 times daily (with meals) 23   Sebastien Hernandez DO   apixaban (ELIQUIS) 5 MG TABS tablet Take 1 tablet by mouth 2 times daily 23   Sebastien Hernandez DO   allopurinol (ZYLOPRIM) 100 MG tablet Take 1 tablet by mouth daily 21   Sarah Saha MD         No Known Allergies     Social History     Tobacco Use    Smoking status: Never    Smokeless tobacco: Never   Substance Use Topics    Alcohol use: Not Currently        No family history on file.       Review of Systems:  Review of systems not obtained due to patient factors.    Objective:   Vital Signs:    /83   Pulse (!) 107   Temp 97.2 °F (36.2 °C) (Rectal)   Resp 23   Ht 1.778 m (5' 10\")   Wt 62 kg (136 lb 11.2 oz)   SpO2 96%   BMI 19.61 kg/m²             Temp (24hrs), Av.9 °F (36.1 °C), Min:96.6 °F (35.9 °C), Max:97.2 °F (36.2 °C)       Intake/Output:   Last shift:      No intake/output data recorded.  Last 3 shifts: No  procedure      11/22/23    ECHO (TTE) COMPLETE (PRN CONTRAST/BUBBLE/STRAIN/3D) 11/24/2023  5:17 PM (Final)    Interpretation Summary    Left Ventricle: Normal left ventricular systolic function with a visually estimated EF of 60 - 65%. Left ventricle size is normal. Mildly increased wall thickness. Normal wall motion. Abnormal diastolic function.  Grade I Diastolic Dysfunction    Aortic Valve: Trileaflet valve. Mildly calcified cusp. Mild annular calcification. Mild regurgitation.    Tricuspid Valve: Moderate regurgitation.    Pulmonary Arteries: Moderate pulmonary hypertension present.    Pericardium: Moderate (1-2 cm) pericardial effusion present. No indication of cardiac tamponade though has some right atrial dyskinesis and partial collapse Pleural effusion visualized in the subcostal view.    Signed by: Glen Nguyen MD on 11/24/2023  5:17 PM                Total of   25  min critical care time spent at bedside during the course of care providing evaluation,management and care decisions and ordering appropriate treatment related to critical care problems exclusive of procedures.  The reason for providing this level of medical care for this critically ill patient was due a critical illness that impaired one or more vital organ systems such that there was a high probability of imminent or life threatening deterioration in the patients condition. This care involved high complexity decision making to assess, manipulate, and support vital system functions, to treat this degree vital organ system failure and to prevent further life threatening deterioration of the patient’s condition.        Ashly Singh PA-C  01/01/24  Pulmonary, Critical Care Medicine  [facility]

## 2024-01-01 NOTE — PROCEDURES
Procedural sedation    Date/Time: 1/1/2024 5:23 PM    Performed by: Clyde Umaña MD  Authorized by: Clyde Umaña MD    Consent:     Consent obtained:  Written    Consent given by:  Patient    Risks discussed:  Allergic reaction, prolonged hypoxia resulting in organ damage and dysrhythmia    Alternatives discussed:  Analgesia without sedation  Universal protocol:     Patient identity confirmed:  Hospital-assigned identification number and arm band  Indications:     Procedure performed:  Cardioversion    Procedure necessitating sedation performed by:  Physician performing sedation    Intended level of sedation:  Moderate  Pre-sedation assessment:     NPO status caution: unable to specify NPO status      Mouth opening:  3 or more finger widths    Mallampati score:  I - soft palate, uvula, fauces, pillars visible    Neck mobility: normal      Pre-sedation assessments completed and reviewed: airway patency      History of difficult intubation: no      Pre-sedation assessment completed:  1/1/2024 5:00 PM  Immediate pre-procedure details:     Reassessment: Patient reassessed immediately prior to procedure      Reviewed: vital signs    Procedure details (see MAR for exact dosages):     Sedation start time:  1/1/2024 5:07 PM    Preoxygenation:  Room air    Sedation:  Ketamine    Intra-procedure monitoring:  Blood pressure monitoring, cardiac monitor, continuous pulse oximetry and frequent vital sign checks    Intra-procedure events: none      Sedation end time:  1/1/2024 5:20 PM    Total sedation time (minutes):  13  Post-procedure details:     Procedure completion:  Tolerated well, no immediate complications

## 2024-01-02 ENCOUNTER — APPOINTMENT (OUTPATIENT)
Facility: HOSPITAL | Age: 74
End: 2024-01-02
Payer: MEDICARE

## 2024-01-02 PROBLEM — D61.818 PANCYTOPENIA (HCC): Status: ACTIVE | Noted: 2024-01-02

## 2024-01-02 PROBLEM — R65.20 SEVERE SEPSIS (HCC): Status: ACTIVE | Noted: 2024-01-02

## 2024-01-02 PROBLEM — A41.9 SEVERE SEPSIS (HCC): Status: ACTIVE | Noted: 2024-01-02

## 2024-01-02 PROBLEM — E88.09 HYPOALBUMINEMIA: Status: ACTIVE | Noted: 2024-01-02

## 2024-01-02 PROBLEM — N17.9 ACUTE KIDNEY INJURY SUPERIMPOSED ON CHRONIC KIDNEY DISEASE (HCC): Status: ACTIVE | Noted: 2024-01-02

## 2024-01-02 PROBLEM — I48.0 PAROXYSMAL ATRIAL FIBRILLATION (HCC): Status: ACTIVE | Noted: 2024-01-02

## 2024-01-02 PROBLEM — J18.9 LLL PNEUMONIA: Status: ACTIVE | Noted: 2024-01-02

## 2024-01-02 PROBLEM — N18.9 ACUTE KIDNEY INJURY SUPERIMPOSED ON CHRONIC KIDNEY DISEASE (HCC): Status: ACTIVE | Noted: 2024-01-02

## 2024-01-02 PROBLEM — Z87.39 HISTORY OF GOUT: Status: ACTIVE | Noted: 2024-01-02

## 2024-01-02 LAB
ALBUMIN SERPL-MCNC: 1.6 G/DL (ref 3.4–5)
ANION GAP SERPL CALC-SCNC: 8 MMOL/L (ref 3–18)
APTT PPP: 87.4 SEC (ref 23–36.4)
APTT PPP: >180 SEC (ref 23–36.4)
BASOPHILS # BLD: ABNORMAL K/UL (ref 0–0.1)
BASOPHILS NFR BLD: ABNORMAL % (ref 0–2)
BUN SERPL-MCNC: 120 MG/DL (ref 7–18)
BUN/CREAT SERPL: 43 (ref 12–20)
CALCIUM SERPL-MCNC: 8.1 MG/DL (ref 8.5–10.1)
CHLORIDE SERPL-SCNC: 122 MMOL/L (ref 100–111)
CHP ED QC CHECK: YES
CO2 SERPL-SCNC: 16 MMOL/L (ref 21–32)
CREAT SERPL-MCNC: 2.79 MG/DL (ref 0.6–1.3)
DIFFERENTIAL METHOD BLD: ABNORMAL
EKG ATRIAL RATE: 102 BPM
EKG ATRIAL RATE: 182 BPM
EKG DIAGNOSIS: NORMAL
EKG P AXIS: 51 DEGREES
EKG P-R INTERVAL: 120 MS
EKG Q-T INTERVAL: 258 MS
EKG Q-T INTERVAL: 314 MS
EKG Q-T INTERVAL: 344 MS
EKG QRS DURATION: 66 MS
EKG QRS DURATION: 72 MS
EKG QRS DURATION: 74 MS
EKG QTC CALCULATION (BAZETT): 444 MS
EKG QTC CALCULATION (BAZETT): 448 MS
EKG QTC CALCULATION (BAZETT): 492 MS
EKG R AXIS: 57 DEGREES
EKG R AXIS: 57 DEGREES
EKG R AXIS: 62 DEGREES
EKG T AXIS: 247 DEGREES
EKG T AXIS: 73 DEGREES
EKG T AXIS: 92 DEGREES
EKG VENTRICULAR RATE: 102 BPM
EKG VENTRICULAR RATE: 148 BPM
EKG VENTRICULAR RATE: 178 BPM
EOSINOPHIL # BLD: ABNORMAL K/UL (ref 0–0.4)
EOSINOPHIL NFR BLD: ABNORMAL % (ref 0–5)
ERYTHROCYTE [DISTWIDTH] IN BLOOD BY AUTOMATED COUNT: 13.2 % (ref 11.6–14.5)
GLUCOSE BLD STRIP.AUTO-MCNC: 142 MG/DL (ref 74–106)
GLUCOSE BLD STRIP.AUTO-MCNC: 153 MG/DL (ref 70–110)
GLUCOSE BLD-MCNC: 153 MG/DL
GLUCOSE SERPL-MCNC: 160 MG/DL (ref 74–99)
HCT VFR BLD AUTO: 22.7 % (ref 36–48)
HGB BLD-MCNC: 7.5 G/DL (ref 13–16)
IMM GRANULOCYTES # BLD AUTO: ABNORMAL K/UL (ref 0–0.04)
IMM GRANULOCYTES NFR BLD AUTO: ABNORMAL %
L PNEUMO AG UR QL IA: NEGATIVE
LYMPHOCYTES # BLD: ABNORMAL K/UL (ref 0.9–3.6)
LYMPHOCYTES NFR BLD: ABNORMAL % (ref 21–52)
MAGNESIUM SERPL-MCNC: 2.5 MG/DL (ref 1.6–2.6)
MCH RBC QN AUTO: 27.1 PG (ref 24–34)
MCHC RBC AUTO-ENTMCNC: 33 G/DL (ref 31–37)
MCV RBC AUTO: 81.9 FL (ref 78–100)
MONOCYTES # BLD: ABNORMAL K/UL (ref 0.05–1.2)
MONOCYTES NFR BLD: ABNORMAL % (ref 3–10)
NEUTS SEG # BLD: ABNORMAL K/UL (ref 1.8–8)
NEUTS SEG NFR BLD: ABNORMAL % (ref 40–73)
NRBC # BLD: 0 K/UL (ref 0–0.01)
NRBC BLD-RTO: 0 PER 100 WBC
PHOSPHATE SERPL-MCNC: 3 MG/DL (ref 2.5–4.9)
PLATELET # BLD AUTO: 26 K/UL (ref 135–420)
PMV BLD AUTO: 10.2 FL (ref 9.2–11.8)
POTASSIUM SERPL-SCNC: 4.4 MMOL/L (ref 3.5–5.5)
RBC # BLD AUTO: 2.77 M/UL (ref 4.35–5.65)
S PNEUM AG UR QL: NEGATIVE
SERVICE CMNT-IMP: ABNORMAL
SODIUM SERPL-SCNC: 146 MMOL/L (ref 136–145)
WBC # BLD AUTO: 0.5 K/UL (ref 4.6–13.2)

## 2024-01-02 PROCEDURE — 70450 CT HEAD/BRAIN W/O DYE: CPT

## 2024-01-02 PROCEDURE — 2500000003 HC RX 250 WO HCPCS: Performed by: PHYSICIAN ASSISTANT

## 2024-01-02 PROCEDURE — 1100000000 HC RM PRIVATE

## 2024-01-02 PROCEDURE — 2580000003 HC RX 258: Performed by: INTERNAL MEDICINE

## 2024-01-02 PROCEDURE — 6360000002 HC RX W HCPCS: Performed by: PHYSICIAN ASSISTANT

## 2024-01-02 PROCEDURE — 87086 URINE CULTURE/COLONY COUNT: CPT

## 2024-01-02 PROCEDURE — 94761 N-INVAS EAR/PLS OXIMETRY MLT: CPT

## 2024-01-02 PROCEDURE — 71250 CT THORAX DX C-: CPT

## 2024-01-02 PROCEDURE — 80069 RENAL FUNCTION PANEL: CPT

## 2024-01-02 PROCEDURE — 2580000003 HC RX 258: Performed by: PHYSICIAN ASSISTANT

## 2024-01-02 PROCEDURE — 93010 ELECTROCARDIOGRAM REPORT: CPT | Performed by: INTERNAL MEDICINE

## 2024-01-02 PROCEDURE — 85025 COMPLETE CBC W/AUTO DIFF WBC: CPT

## 2024-01-02 PROCEDURE — 99222 1ST HOSP IP/OBS MODERATE 55: CPT | Performed by: INTERNAL MEDICINE

## 2024-01-02 PROCEDURE — 3430000000 HC RX DIAGNOSTIC RADIOPHARMACEUTICAL: Performed by: INTERNAL MEDICINE

## 2024-01-02 PROCEDURE — 85730 THROMBOPLASTIN TIME PARTIAL: CPT

## 2024-01-02 PROCEDURE — P9041 ALBUMIN (HUMAN),5%, 50ML: HCPCS | Performed by: PHYSICIAN ASSISTANT

## 2024-01-02 PROCEDURE — 99232 SBSQ HOSP IP/OBS MODERATE 35: CPT

## 2024-01-02 PROCEDURE — A4216 STERILE WATER/SALINE, 10 ML: HCPCS | Performed by: PHYSICIAN ASSISTANT

## 2024-01-02 PROCEDURE — A9539 TC99M PENTETATE: HCPCS | Performed by: INTERNAL MEDICINE

## 2024-01-02 PROCEDURE — 6360000002 HC RX W HCPCS: Performed by: INTERNAL MEDICINE

## 2024-01-02 PROCEDURE — 87449 NOS EACH ORGANISM AG IA: CPT

## 2024-01-02 PROCEDURE — 83735 ASSAY OF MAGNESIUM: CPT

## 2024-01-02 RX ORDER — POLYETHYLENE GLYCOL 3350 17 G/17G
17 POWDER, FOR SOLUTION ORAL DAILY
Status: DISCONTINUED | OUTPATIENT
Start: 2024-01-02 | End: 2024-01-08

## 2024-01-02 RX ORDER — ONDANSETRON 4 MG/1
4 TABLET, ORALLY DISINTEGRATING ORAL EVERY 8 HOURS PRN
Status: DISCONTINUED | OUTPATIENT
Start: 2024-01-02 | End: 2024-01-12 | Stop reason: HOSPADM

## 2024-01-02 RX ORDER — ACETAMINOPHEN 650 MG/1
650 SUPPOSITORY RECTAL EVERY 6 HOURS PRN
Status: DISCONTINUED | OUTPATIENT
Start: 2024-01-02 | End: 2024-01-04

## 2024-01-02 RX ORDER — ONDANSETRON 2 MG/ML
4 INJECTION INTRAMUSCULAR; INTRAVENOUS EVERY 6 HOURS PRN
Status: DISCONTINUED | OUTPATIENT
Start: 2024-01-02 | End: 2024-01-12 | Stop reason: HOSPADM

## 2024-01-02 RX ORDER — SODIUM CHLORIDE 0.9 % (FLUSH) 0.9 %
5-40 SYRINGE (ML) INJECTION EVERY 12 HOURS SCHEDULED
Status: DISCONTINUED | OUTPATIENT
Start: 2024-01-02 | End: 2024-01-12 | Stop reason: HOSPADM

## 2024-01-02 RX ORDER — SODIUM CHLORIDE, SODIUM LACTATE, POTASSIUM CHLORIDE, AND CALCIUM CHLORIDE .6; .31; .03; .02 G/100ML; G/100ML; G/100ML; G/100ML
1000 INJECTION, SOLUTION INTRAVENOUS ONCE
Status: COMPLETED | OUTPATIENT
Start: 2024-01-02 | End: 2024-01-02

## 2024-01-02 RX ORDER — ALBUMIN, HUMAN INJ 5% 5 %
25 SOLUTION INTRAVENOUS ONCE
Status: COMPLETED | OUTPATIENT
Start: 2024-01-02 | End: 2024-01-02

## 2024-01-02 RX ORDER — KIT FOR THE PREPARATION OF TECHNETIUM TC 99M PENTETATE 20 MG/1
33 INJECTION, POWDER, LYOPHILIZED, FOR SOLUTION INTRAVENOUS; RESPIRATORY (INHALATION)
Status: COMPLETED | OUTPATIENT
Start: 2024-01-02 | End: 2024-01-02

## 2024-01-02 RX ORDER — ACETAMINOPHEN 325 MG/1
650 TABLET ORAL EVERY 6 HOURS PRN
Status: DISCONTINUED | OUTPATIENT
Start: 2024-01-02 | End: 2024-01-12 | Stop reason: HOSPADM

## 2024-01-02 RX ORDER — POLYETHYLENE GLYCOL 3350 17 G/17G
17 POWDER, FOR SOLUTION ORAL DAILY PRN
Status: DISCONTINUED | OUTPATIENT
Start: 2024-01-02 | End: 2024-01-12 | Stop reason: HOSPADM

## 2024-01-02 RX ORDER — SODIUM CHLORIDE 0.9 % (FLUSH) 0.9 %
5-40 SYRINGE (ML) INJECTION PRN
Status: DISCONTINUED | OUTPATIENT
Start: 2024-01-02 | End: 2024-01-12 | Stop reason: HOSPADM

## 2024-01-02 RX ADMIN — CEFEPIME 1000 MG: 1 INJECTION, POWDER, FOR SOLUTION INTRAMUSCULAR; INTRAVENOUS at 12:11

## 2024-01-02 RX ADMIN — AMIODARONE HYDROCHLORIDE 0.5 MG/MIN: 1.8 INJECTION, SOLUTION INTRAVENOUS at 12:13

## 2024-01-02 RX ADMIN — SODIUM CHLORIDE, SODIUM LACTATE, POTASSIUM CHLORIDE, AND CALCIUM CHLORIDE 1000 ML: 600; 310; 30; 20 INJECTION, SOLUTION INTRAVENOUS at 04:16

## 2024-01-02 RX ADMIN — SODIUM CHLORIDE, PRESERVATIVE FREE 20 MG: 5 INJECTION INTRAVENOUS at 12:06

## 2024-01-02 RX ADMIN — PIPERACILLIN AND TAZOBACTAM 3375 MG: 3; .375 INJECTION, POWDER, FOR SOLUTION INTRAVENOUS at 01:36

## 2024-01-02 RX ADMIN — Medication 5.5 MILLICURIE: at 10:35

## 2024-01-02 RX ADMIN — SODIUM CHLORIDE, POTASSIUM CHLORIDE, SODIUM LACTATE AND CALCIUM CHLORIDE 1000 ML: 600; 310; 30; 20 INJECTION, SOLUTION INTRAVENOUS at 09:17

## 2024-01-02 RX ADMIN — ALBUMIN (HUMAN) 25 G: 12.5 INJECTION, SOLUTION INTRAVENOUS at 12:01

## 2024-01-02 RX ADMIN — Medication 33 MILLICURIE: at 10:36

## 2024-01-02 RX ADMIN — HEPARIN SODIUM 15 UNITS/KG/HR: 10000 INJECTION, SOLUTION INTRAVENOUS at 09:15

## 2024-01-02 ASSESSMENT — PAIN SCALES - GENERAL: PAINLEVEL_OUTOF10: 0

## 2024-01-02 ASSESSMENT — LIFESTYLE VARIABLES
HOW OFTEN DO YOU HAVE A DRINK CONTAINING ALCOHOL: PATIENT UNABLE TO ANSWER
HOW MANY STANDARD DRINKS CONTAINING ALCOHOL DO YOU HAVE ON A TYPICAL DAY: PATIENT UNABLE TO ANSWER

## 2024-01-02 NOTE — CONSULTS
[facility] - Consult Note    Cardiology consultation request from Dr. Umaña for evaluation and management/treatment of pAF/AFL    Date of  Admission: 1/1/2024 11:53 AM   Primary Care Physician:  No primary care provider on file.    Attending Cardiologist: Dr. Velásquez      Assessment:     -AMS. CT Head negative for acute findings.   -Severe sepsis in setting of PNA  -Paroxysmal atrial flutter with RVR, physiologic in setting of above. Cardioversion x 2 in the ED and subsequently started on IV amio gtt. On Eliquis as outpatient.   -ZULMA on CKD with hyperkalemia.   -NSTEMI type 2 in setting of above, troponin not c/w primary ACS.   -Chronic HFpEF, Echo 11/24/2023 with LVEF 60-65% with normal wall motion, grade I diastolic dysfunction  -Moderate pericardial effusion.   -Metastatic Right pleural effusion.   -Hx PE 11/2023.   -Stage IV adenocarcinoma lung, on chemo.   -HTN      Consult by Dr. Velásquez      Plan:     -RVR improved this morning on IV amio gtt, reduce to 0.5 mg/min. Can increase back to 1.0 mg/min if HR > 120 bpm. Goal HR < 120 bpm in setting of sepsis.   -Heparin gtt for stroke prophylaxis, hx PE.   -Limited Echo pending.   -Continue supportive care.     History of Present Illness:     This is a 73 y.o. male admitted for Sepsis ([facility]) [A41.9].    Patient complains of:  JIHAN Sorto is a 73 y.o. male, pmhx as stated above, who we are seeing in consult for AFL/AF RVR. Patient unable to provide pertinent hx. Patient was found on the ground by EMS. He was admitted for septic shock 2/2 PNA. In the ER, he was cardioverted twice for rapid AF/AFL and subsequently started on IV amio gtt.     Cardiac risk factors: advanced age (older than 55 for men, 65 for women), hypertension, and male gender    Review of Symptoms:     Review of systems not obtained due to patient factors.     Past Medical History:     Past Medical History:   Diagnosis Date    CKD (chronic kidney disease) stage 4, GFR 15-29 ml/min      01/01/24  1229   WBC 1.3*   HGB 10.8*   HCT 33.3*   PLT 42*     Recent Labs     01/01/24  1229 01/01/24  1740    146*   K 6.1* 4.8   * 120*   CO2 16* 16*   * 137*   CREATININE 3.64* 3.07*   PHOS  --  3.6   ALT 26  --        All Cardiac Markers in the last 24 hours:    Lab Results   Component Value Date/Time    TROPHS 97 01/01/2024 05:40 PM    TROPHS 79 01/01/2024 03:58 PM    TROPHS 105 01/01/2024 12:20 PM     Lab Results   Component Value Date/Time    NTPROBNP 417 11/22/2023 08:57 AM       Last Lipid:  No results found for: \"CHOL\", \"CHOLX\", \"CHLST\", \"CHOLV\", \"HDL\", \"HDLC\", \"LDL\", \"LDLC\", \"TGLX\", \"TRIGL\"    Cardiographics:     Encounter Date: 01/01/24   EKG 12 Lead   Result Value    Ventricular Rate 178    Atrial Rate 182    QRS Duration 66    Q-T Interval 258    QTc Calculation (Bazett) 444    R Axis 57    T Axis 247    Diagnosis      Atrial flutter with rapid ventricular rate  Nonspecific ST and T wave abnormality  Abnormal ECG  Confirmed by MD Christen, Dagoberto ([id number]) on 1/1/2024 5:37:55 PM  Also confirmed by MD Velásquez Ryan ([id number]),  wAa Clark ([id number])  on   1/2/2024 6:44:27 AM       11/22/23    ECHO (TTE) COMPLETE (PRN CONTRAST/BUBBLE/STRAIN/3D) 11/24/2023  5:17 PM (Final)    Interpretation Summary    Left Ventricle: Normal left ventricular systolic function with a visually estimated EF of 60 - 65%. Left ventricle size is normal. Mildly increased wall thickness. Normal wall motion. Abnormal diastolic function.  Grade I Diastolic Dysfunction    Aortic Valve: Trileaflet valve. Mildly calcified cusp. Mild annular calcification. Mild regurgitation.    Tricuspid Valve: Moderate regurgitation.    Pulmonary Arteries: Moderate pulmonary hypertension present.    Pericardium: Moderate (1-2 cm) pericardial effusion present. No indication of cardiac tamponade though has some right atrial dyskinesis and partial collapse Pleural effusion visualized in the subcostal view.    Signed by: Glen ALTAMIRANO  99

## 2024-01-02 NOTE — ED NOTES
Pt called deysi Clifton carol in chart. Pt requesting water. Swallow screen completed pt failed placed NPO with aspiration precautions. VSS.

## 2024-01-02 NOTE — ED NOTES
Lab called stating pt needs blue top drawn for PTT. Last PTT drawn at 0052, however last rate change at 0423. Will draw PTT [id number] from last rate change.     Per CCL - draw PTT now and pause heparin drip.

## 2024-01-02 NOTE — PROGRESS NOTES
[facility]   Pharmacy Pharmacokinetic Monitoring Service - Vancomycin    Indication: sepsis, neutropenic  Goal level: 10-20 mg/L  Day of Therapy: 2  Additional Antimicrobials: cefepime    Pertinent Laboratory Values:   Wt Readings from Last 1 Encounters:   01/02/24 61.7 kg (136 lb)     Temp Readings from Last 1 Encounters:   01/01/24 97.2 °F (36.2 °C) (Rectal)     Estimated Creatinine Clearance: 20 mL/min (A) (based on SCr of 2.79 mg/dL (H)).    Recent Labs     01/01/24  1229 01/01/24  1740 01/02/24  0748 01/02/24  0825   CREATININE 3.64* 3.07* 2.79*  --    * 137* 120*  --    WBC 1.3*  --   --  0.5*     Pertinent Cultures:  Date Source Results   1/1 blood NGTD   MRSA Nasal Swab: ordered    Assessment:  Date Current Dose Level (mg/L) Timing of Level (h)   1/1 1,500 mg x1 - -   1/2 - - -     Plan:  Dose by level  No dose today  Ordered a level for 1/3 with AM labs  Pharmacy will continue to monitor patient and adjust therapy as indicated    Thank you for the consult,  Levy [facility]  1/2/2024

## 2024-01-02 NOTE — PROGRESS NOTES
Aspiration precautions, Keep HOB >30 degrees   -On heparin for recent PE, held due to anemia, follow up echo   CVS:Monitor HD, MAP goal >65. Continue amio gtt at 0.5mg/min and IVF. Appreciate cardiology recs  GI: passed swallow study, started diet  Renal: Trend Cr, UOP. Díaz in place  Hem/Onc: Trend H/H, monitor for s/o active bleeding  -Hold heparin gtt due to drop in hgb though could be dilutional  -Daily CBC  -Consulted oncology  I/D:Trend WBCs and temperature curve. Follow up blood cultures  -Consulted ID  -Continue vanc, switch zosyn to cefepime  Metabolic: Daily BMP, mag, phos. Trend lytes and replace per protocol.  Endocrine:Q6 glucoses. SSI. Avoid hypoglycemia.  Musc/Skin: no acute issues, wound care, PT/OT/SLP  Full Code   Discussed in interdisciplinary rounds. Will transfer to stepdown.     Best practice :    Glycemic control  IHI ICU bundles:   Díaz Bundle Followed  Stress ulcer prophylaxis.   Pepcid  DVT prophylaxis.   Heparin gtt held for acute anemia  Need for Lines, díaz assessed.  Palliative care evaluation.       Ngoc Benites MD  01/02/24  Pulmonary, Critical Care Medicine  [facility]

## 2024-01-02 NOTE — PROGRESS NOTES
[facility]  Progress Note    Patient: Chad Sorto Age: 73 y.o. 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 Fowler outpatient  Pancytopenia d/t above   Hypoalbuminemia- 1.6 , receiving albumin IV currently   NSTEMI, type II   Chronic HFpEF   History of pulmonary embolism 2023-on Eliquis at home  History of right hydropneumothorax  Moderate pulmonary hypertension  History of gout  Hypertension       Plan:  -Continue Vanco and cefepime  -Follow blood cultures NGTD  -Continue amiodarone drip at 0.5mg/min as ordered, can increase to 1.0 mg/min if HR >120 per cardio, Goal HR < 120   -Heparin drip on hold d/t thrombocytopenia, will await HemOnc recommendation on further recommendations   -Echo pending results- follow up   -Cardio, ID, HemOnc consulted- appreciate assistance     PT/OT/IS    DVT  to left with normal respiratory effort  CV: RRR, no m/r/g  Abdomen: soft, non-tender, normal bowel sounds  Extremities: no cyanosis, no peripheral edema  Neuro: No focal deficits, motor/sensory intact  Skin: Normal color, intact, R chest mediport in place, multipe MTP and DIP nodules on bilateral fingers   Psych: appropriate affect, alert and oriented to person, place and time    Current Facility-Administered Medications   Medication Dose Route Frequency    albumin human 5% IV solution 25 g  25 g IntraVENous Once    polyethylene glycol (GLYCOLAX) packet 17 g  17 g Oral Daily    cefepime (MAXIPIME) 1,000 mg in sodium chloride 0.9 % 50 mL IVPB (mini-bag)  1,000 mg IntraVENous Q12H    vancomycin (VANCOCIN) intermittent dosing (placeholder)   Other RX Placeholder    dextrose bolus 10% 250 mL  250 mL IntraVENous Once    glucose chewable tablet 16 g  4 tablet Oral PRN    dextrose bolus 10% 125 mL  125 mL IntraVENous PRN    Or    dextrose bolus 10% 250 mL  250 mL IntraVENous PRN    glucagon injection 1 mg  1 mg SubCUTAneous PRN    dextrose 10 % infusion   IntraVENous Continuous PRN    amiodarone (NEXTERONE) 360 mg in dextrose 5% 200 ml  0.5 mg/min IntraVENous Continuous    Ketamine (KETALAR) injection syringe 40 mg  0.65 mg/kg IntraVENous Once    heparin (porcine) injection 4,960 Units  80 Units/kg IntraVENous PRN    heparin (porcine) injection 2,480 Units  40 Units/kg IntraVENous PRN    [Held by provider] heparin 25,000 units in dextrose 5% 250 mL (premix) infusion  5-30 Units/kg/hr IntraVENous Continuous    famotidine (PEPCID) 20 mg in sodium chloride (PF) 0.9 % 10 mL injection  20 mg IntraVENous Daily     Current Outpatient Medications   Medication Sig    torsemide (DEMADEX) 20 MG tablet Take 1 tablet by mouth daily    ondansetron (ZOFRAN) 8 MG tablet Take 1 tablet by mouth    OLANZapine (ZYPREXA) 2.5 MG tablet Take 1 tablet by mouth    folic acid (FOLVITE) 1 MG tablet Take 1 tablet by mouth    droNABinol (MARINOL) 2.5

## 2024-01-02 NOTE — ED NOTES
Pt had bout of tachycardia, pt now improved BP and HR. Asymptomatic. Discussed with charge nurse, pt may go upstairs.

## 2024-01-02 NOTE — ACP (ADVANCE CARE PLANNING)
Advance Care Planning     Advance Care Planning Inpatient Note  [facility] Department    Today's Date: 1/2/2024  Unit: [facility] EMERGENCY DEPT    Received request from .  Upon review of chart and communication with care team, patient's decision making abilities are not in question.. Patient was/were present in the room during visit.    Goals of ACP Conversation:  Discuss advance care planning documents    Health Care Decision Makers:       Primary Decision Maker: Jose Mahajan)Elodia Niece/Nephew - [phone]    Secondary Decision Maker: Danilo Mahajan)Ericka/Nephew - [phone]  Summary:  Verified Documents  Verified Healthcare Decision Maker    Advance Care Planning Documents (Patient Wishes):  Healthcare Power of /Advance Directive Appointment of Health Care Agent     Assessment:  Patient seen in bed 4 of the emergency room where he will be admitted to the hospital  with acute sepsis and change in mental status. Advance directive is present.    Interventions:      Care Preferences Communicated:   No    Outcomes/Plan:  Existing advance directive reviewed with patient; no changes to patient's previously recorded wishes.    Electronically signed by Ej Marquez Jr., [facility] on 1/2/2024 at 12:17 PM

## 2024-01-02 NOTE — ED NOTES
Assumed care of pt. Pt admitted for severe sepsis, waiting for bed assignment. Pt on heparin and amiodirone drip, both infusing without issue.     Pt BP elevated. Called ICU to make MD aware. Stated to change BP cuff to other arm to verify elevated BP and call back with any changes.

## 2024-01-02 NOTE — ED NOTES
Pt swallow screen retested and pt passed, swallowed with no issue. Discussed with CCL, OK to reverse NPO status. Heart healthy, diabetic, low sodium diet ordered.

## 2024-01-02 NOTE — PROGRESS NOTES
completed the initial Spiritual Assessment of the patient, and offered Pastoral Care support to the patient in bed 4 of the emergency room where he will be admitted to the hospital with acute sepsis and mental status change. There is an advance directive on file..Patient does not have any Sikh/cultural needs that will affect patient’s preferences in health care. Chaplains will continue to follow and will provide pastoral care on an as needed/requested basis.    vasile Marquez  Board Certified   Spiritual Care Department  [phone]

## 2024-01-02 NOTE — ED NOTES
Called report to 4S. When this RN went in to get pt ready for transport, pt BP soft and HR tachy and irregular. Hospitalist paged.

## 2024-01-03 ENCOUNTER — APPOINTMENT (OUTPATIENT)
Facility: HOSPITAL | Age: 74
End: 2024-01-03
Payer: MEDICARE

## 2024-01-03 PROBLEM — G93.41 ACUTE METABOLIC ENCEPHALOPATHY: Status: ACTIVE | Noted: 2024-01-03

## 2024-01-03 LAB
ALBUMIN SERPL-MCNC: 2.2 G/DL (ref 3.4–5)
ANION GAP SERPL CALC-SCNC: 7 MMOL/L (ref 3–18)
APPEARANCE UR: CLEAR
BACTERIA URNS QL MICRO: ABNORMAL /HPF
BASOPHILS # BLD: ABNORMAL K/UL (ref 0–0.1)
BASOPHILS NFR BLD: ABNORMAL % (ref 0–2)
BILIRUB UR QL: NEGATIVE
BUN SERPL-MCNC: 103 MG/DL (ref 7–18)
BUN/CREAT SERPL: 36 (ref 12–20)
CALCIUM SERPL-MCNC: 8.6 MG/DL (ref 8.5–10.1)
CHLORIDE SERPL-SCNC: 127 MMOL/L (ref 100–111)
CO2 SERPL-SCNC: 14 MMOL/L (ref 21–32)
COLOR UR: YELLOW
CREAT SERPL-MCNC: 2.9 MG/DL (ref 0.6–1.3)
CREAT UR-MCNC: 28 MG/DL (ref 30–125)
DIFFERENTIAL METHOD BLD: ABNORMAL
ECHO AO ROOT DIAM: 2.3 CM
ECHO AO ROOT INDEX: 1.3 CM/M2
ECHO BSA: 1.75 M2
ECHO LV FRACTIONAL SHORTENING: 31 % (ref 28–44)
ECHO LV INTERNAL DIMENSION DIASTOLE INDEX: 1.98 CM/M2
ECHO LV INTERNAL DIMENSION DIASTOLIC: 3.5 CM (ref 4.2–5.9)
ECHO LV INTERNAL DIMENSION SYSTOLIC INDEX: 1.36 CM/M2
ECHO LV INTERNAL DIMENSION SYSTOLIC: 2.4 CM
ECHO LV IVSD: 1.3 CM (ref 0.6–1)
ECHO LV MASS 2D: 153.8 G (ref 88–224)
ECHO LV MASS INDEX 2D: 86.9 G/M2 (ref 49–115)
ECHO LV POSTERIOR WALL DIASTOLIC: 1.3 CM (ref 0.6–1)
ECHO LV RELATIVE WALL THICKNESS RATIO: 0.74
ECHO LVOT AREA: 3.1 CM2
ECHO LVOT DIAM: 2 CM
EOSINOPHIL # BLD: ABNORMAL K/UL (ref 0–0.4)
EOSINOPHIL #/AREA URNS HPF: NORMAL
EOSINOPHIL NFR BLD: ABNORMAL % (ref 0–5)
EPITH CASTS URNS QL MICRO: ABNORMAL /LPF (ref 0–5)
ERYTHROCYTE [DISTWIDTH] IN BLOOD BY AUTOMATED COUNT: 13.5 % (ref 11.6–14.5)
GLUCOSE BLD STRIP.AUTO-MCNC: 140 MG/DL (ref 70–110)
GLUCOSE BLD STRIP.AUTO-MCNC: 152 MG/DL (ref 70–110)
GLUCOSE BLD STRIP.AUTO-MCNC: 170 MG/DL (ref 70–110)
GLUCOSE SERPL-MCNC: 111 MG/DL (ref 74–99)
GLUCOSE UR STRIP.AUTO-MCNC: NEGATIVE MG/DL
HCT VFR BLD AUTO: 22.3 % (ref 36–48)
HGB BLD-MCNC: 7.3 G/DL (ref 13–16)
HGB UR QL STRIP: ABNORMAL
IMM GRANULOCYTES # BLD AUTO: ABNORMAL K/UL (ref 0–0.04)
IMM GRANULOCYTES NFR BLD AUTO: ABNORMAL %
KETONES UR QL STRIP.AUTO: NEGATIVE MG/DL
LEUKOCYTE ESTERASE UR QL STRIP.AUTO: ABNORMAL
LYMPHOCYTES # BLD: ABNORMAL K/UL (ref 0.9–3.6)
LYMPHOCYTES NFR BLD: ABNORMAL % (ref 21–52)
MAGNESIUM SERPL-MCNC: 2.7 MG/DL (ref 1.6–2.6)
MCH RBC QN AUTO: 27.2 PG (ref 24–34)
MCHC RBC AUTO-ENTMCNC: 32.7 G/DL (ref 31–37)
MCV RBC AUTO: 83.2 FL (ref 78–100)
MONOCYTES # BLD: ABNORMAL K/UL (ref 0.05–1.2)
MONOCYTES NFR BLD: ABNORMAL % (ref 3–10)
NEUTS SEG # BLD: ABNORMAL K/UL (ref 1.8–8)
NEUTS SEG NFR BLD: ABNORMAL % (ref 40–73)
NITRITE UR QL STRIP.AUTO: NEGATIVE
NRBC # BLD: 0 K/UL (ref 0–0.01)
NRBC BLD-RTO: 0 PER 100 WBC
PH UR STRIP: 5 (ref 5–8)
PHOSPHATE SERPL-MCNC: 3.1 MG/DL (ref 2.5–4.9)
PLATELET # BLD AUTO: 32 K/UL (ref 135–420)
PLATELET COMMENT: ABNORMAL
PMV BLD AUTO: 10.5 FL (ref 9.2–11.8)
POTASSIUM SERPL-SCNC: 4.4 MMOL/L (ref 3.5–5.5)
PROCALCITONIN SERPL-MCNC: 4.13 NG/ML
PROT UR STRIP-MCNC: ABNORMAL MG/DL
PROT UR-MCNC: 27 MG/DL
RBC # BLD AUTO: 2.68 M/UL (ref 4.35–5.65)
RBC #/AREA URNS HPF: ABNORMAL /HPF (ref 0–5)
RBC MORPH BLD: ABNORMAL
SODIUM SERPL-SCNC: 148 MMOL/L (ref 136–145)
SP GR UR REFRACTOMETRY: 1.01 (ref 1–1.03)
UROBILINOGEN UR QL STRIP.AUTO: 0.2 EU/DL (ref 0.2–1)
UUN UR-MCNC: 569 MG/DL
VANCOMYCIN SERPL-MCNC: 13.8 UG/ML (ref 5–40)
WBC # BLD AUTO: 0.5 K/UL (ref 4.6–13.2)
WBC URNS QL MICRO: ABNORMAL /HPF (ref 0–4)

## 2024-01-03 PROCEDURE — 2500000003 HC RX 250 WO HCPCS: Performed by: INTERNAL MEDICINE

## 2024-01-03 PROCEDURE — 6360000002 HC RX W HCPCS: Performed by: INTERNAL MEDICINE

## 2024-01-03 PROCEDURE — 6370000000 HC RX 637 (ALT 250 FOR IP): Performed by: INTERNAL MEDICINE

## 2024-01-03 PROCEDURE — 99232 SBSQ HOSP IP/OBS MODERATE 35: CPT | Performed by: INTERNAL MEDICINE

## 2024-01-03 PROCEDURE — 2580000003 HC RX 258: Performed by: PHYSICIAN ASSISTANT

## 2024-01-03 PROCEDURE — 97535 SELF CARE MNGMENT TRAINING: CPT

## 2024-01-03 PROCEDURE — 2500000003 HC RX 250 WO HCPCS: Performed by: PHYSICIAN ASSISTANT

## 2024-01-03 PROCEDURE — 2580000003 HC RX 258

## 2024-01-03 PROCEDURE — 81001 URINALYSIS AUTO W/SCOPE: CPT

## 2024-01-03 PROCEDURE — 87205 SMEAR GRAM STAIN: CPT

## 2024-01-03 PROCEDURE — 6370000000 HC RX 637 (ALT 250 FOR IP): Performed by: PHYSICIAN ASSISTANT

## 2024-01-03 PROCEDURE — 1100000000 HC RM PRIVATE

## 2024-01-03 PROCEDURE — 82962 GLUCOSE BLOOD TEST: CPT

## 2024-01-03 PROCEDURE — 80202 ASSAY OF VANCOMYCIN: CPT

## 2024-01-03 PROCEDURE — 6360000002 HC RX W HCPCS: Performed by: STUDENT IN AN ORGANIZED HEALTH CARE EDUCATION/TRAINING PROGRAM

## 2024-01-03 PROCEDURE — 84133 ASSAY OF URINE POTASSIUM: CPT

## 2024-01-03 PROCEDURE — 84540 ASSAY OF URINE/UREA-N: CPT

## 2024-01-03 PROCEDURE — 84156 ASSAY OF PROTEIN URINE: CPT

## 2024-01-03 PROCEDURE — 84300 ASSAY OF URINE SODIUM: CPT

## 2024-01-03 PROCEDURE — 6360000002 HC RX W HCPCS: Performed by: PHYSICIAN ASSISTANT

## 2024-01-03 PROCEDURE — 82570 ASSAY OF URINE CREATININE: CPT

## 2024-01-03 PROCEDURE — 83735 ASSAY OF MAGNESIUM: CPT

## 2024-01-03 PROCEDURE — A4216 STERILE WATER/SALINE, 10 ML: HCPCS | Performed by: PHYSICIAN ASSISTANT

## 2024-01-03 PROCEDURE — 2580000003 HC RX 258: Performed by: INTERNAL MEDICINE

## 2024-01-03 PROCEDURE — 83935 ASSAY OF URINE OSMOLALITY: CPT

## 2024-01-03 PROCEDURE — 93308 TTE F-UP OR LMTD: CPT

## 2024-01-03 PROCEDURE — 97165 OT EVAL LOW COMPLEX 30 MIN: CPT

## 2024-01-03 PROCEDURE — 85025 COMPLETE CBC W/AUTO DIFF WBC: CPT

## 2024-01-03 PROCEDURE — 97530 THERAPEUTIC ACTIVITIES: CPT

## 2024-01-03 PROCEDURE — 93308 TTE F-UP OR LMTD: CPT | Performed by: INTERNAL MEDICINE

## 2024-01-03 PROCEDURE — 36415 COLL VENOUS BLD VENIPUNCTURE: CPT

## 2024-01-03 PROCEDURE — 80069 RENAL FUNCTION PANEL: CPT

## 2024-01-03 PROCEDURE — 97162 PT EVAL MOD COMPLEX 30 MIN: CPT

## 2024-01-03 PROCEDURE — 84145 PROCALCITONIN (PCT): CPT

## 2024-01-03 RX ORDER — M-VIT,TX,IRON,MINS/CALC/FOLIC 27MG-0.4MG
1 TABLET ORAL DAILY
Status: DISCONTINUED | OUTPATIENT
Start: 2024-01-03 | End: 2024-01-08

## 2024-01-03 RX ORDER — SODIUM BICARBONATE 650 MG/1
650 TABLET ORAL 2 TIMES DAILY
Status: DISCONTINUED | OUTPATIENT
Start: 2024-01-03 | End: 2024-01-08

## 2024-01-03 RX ORDER — HALOPERIDOL 5 MG/ML
5 INJECTION INTRAMUSCULAR ONCE
Status: COMPLETED | OUTPATIENT
Start: 2024-01-03 | End: 2024-01-03

## 2024-01-03 RX ADMIN — FILGRASTIM-AAFI 300 MCG: 300 INJECTION, SOLUTION SUBCUTANEOUS at 12:24

## 2024-01-03 RX ADMIN — HALOPERIDOL LACTATE 5 MG: 5 INJECTION, SOLUTION INTRAMUSCULAR at 06:30

## 2024-01-03 RX ADMIN — HALOPERIDOL LACTATE 5 MG: 5 INJECTION, SOLUTION INTRAMUSCULAR at 01:30

## 2024-01-03 RX ADMIN — CEFEPIME 1000 MG: 1 INJECTION, POWDER, FOR SOLUTION INTRAMUSCULAR; INTRAVENOUS at 01:34

## 2024-01-03 RX ADMIN — AMIODARONE HYDROCHLORIDE 0.5 MG/MIN: 1.8 INJECTION, SOLUTION INTRAVENOUS at 03:04

## 2024-01-03 RX ADMIN — CEFEPIME 1000 MG: 1 INJECTION, POWDER, FOR SOLUTION INTRAMUSCULAR; INTRAVENOUS at 12:45

## 2024-01-03 RX ADMIN — SODIUM BICARBONATE: 84 INJECTION, SOLUTION INTRAVENOUS at 16:34

## 2024-01-03 RX ADMIN — SODIUM BICARBONATE 650 MG: 650 TABLET ORAL at 20:13

## 2024-01-03 RX ADMIN — SODIUM CHLORIDE, PRESERVATIVE FREE 10 ML: 5 INJECTION INTRAVENOUS at 20:14

## 2024-01-03 RX ADMIN — AMIODARONE HYDROCHLORIDE 0.5 MG/MIN: 1.8 INJECTION, SOLUTION INTRAVENOUS at 16:34

## 2024-01-03 RX ADMIN — SODIUM CHLORIDE, PRESERVATIVE FREE 20 MG: 5 INJECTION INTRAVENOUS at 09:26

## 2024-01-03 RX ADMIN — POLYETHYLENE GLYCOL 3350 17 G: 17 POWDER, FOR SOLUTION ORAL at 09:25

## 2024-01-03 RX ADMIN — VANCOMYCIN HYDROCHLORIDE 1000 MG: 1 INJECTION, POWDER, LYOPHILIZED, FOR SOLUTION INTRAVENOUS at 12:37

## 2024-01-03 RX ADMIN — Medication 1 TABLET: at 18:04

## 2024-01-03 ASSESSMENT — PAIN SCALES - PAIN ASSESSMENT IN ADVANCED DEMENTIA (PAINAD)
BODYLANGUAGE: 0
CONSOLABILITY: 0
TOTALSCORE: 0
FACIALEXPRESSION: 0
BREATHING: 0
NEGVOCALIZATION: 0

## 2024-01-03 ASSESSMENT — PAIN SCALES - GENERAL: PAINLEVEL_OUTOF10: 0

## 2024-01-03 NOTE — CARE COORDINATION
Scanned [facility] Discharge-Transfer Summary into [facility] and informed [facility] patient is active with [facility].

## 2024-01-03 NOTE — PROGRESS NOTES
Patient able to lie flat and oxygen saturation maintained between 93-95% on 3L.  Patient will have left heart cath this morning.

## 2024-01-03 NOTE — CONSULTS
Consult Note      Consult requested by: Angelica Evans MD    ADMIT DATE: 1/1/2024  CONSULT DATE: January 3, 2024                 Admission diagnosis: Severe sepsis (HCC)   Reason for Nephrology Consultation: ZULMA , metabolic acidoses      Assessment and plan:  #1 acute kidney injury on chronic kidney disease stage IIIb, baseline creatinine close to 1.8-2 range since 2021, etiology appears to be secondary to prerenal state versus tubular injury/acute tubular necrosis.  Prerenal state appears to be secondary to volume depletion in the setting of sepsis and poor intake.  Tubular injury associated with nongap metabolic acidosis may be related to carboplatin although the incidence is less than with cisplatin. pembrolizumab can also cause tubular injury, interstitial nephritis as well as nephrotic syndrome.  CT abdomen was done without obstructive uropathy but does show right upper pole nonobstructing nephrolithiasis  #2 nongap hyperchloremic metabolic acidosis, suspect secondary to proximal tubular injury from chemotherapy, causing increased bicarb loss.  Needs either p.o. or IV bicarb to replete  #3 hypernatremia secondary to free water deficit  #4 sepsis secondary to pneumonia versus cystitis  #5 recent diagnosis of stage IV nonsmall cell lung cancer with malignant pleural effusion on chemotherapy  #6 cystitis  #7 neutropenia and thrombocytopenia  #8 altered mental status secondary to metabolic encephalopathy, unclear what his underlying baseline status is  #9 CKD3b , baseline creat of ~ 1.80-2 range since 2021    Plan:  #1 strict intake output charting  #2 gentle hypotonic bicarb drip  #3 adding some p.o. bicarb, will plan to wean off IV bicarb soon  #4 hypotonic fluids for hypernatremia  #5 encourage p.o. intake  #6 urine studies including UA with micro, quantify proteinuria and calculate urine osmolar gap to estimate renal acidification, urine eos  #7 monitor electrolytes renal functions daily  #8 no acute

## 2024-01-03 NOTE — PROGRESS NOTES
Consultation noted, seeing patient shortly    Please call with questions    Servando Patel MD FASN  Cell [phone]  Pager: [phone]  Fax   [phone]

## 2024-01-03 NOTE — PROGRESS NOTES
[facility] - Progress Note    Admit Date: 1/1/2024  Attending Cardiologist: Dr. Dario Colindres     Assessment:     -AMS. CT Head negative for acute findings.   -Severe sepsis in setting of PNA  -Paroxysmal atrial flutter with RVR, physiologic in setting of above. Cardioversion x 2 in the ED, subsequently started on IV amio gtt. On Eliquis and coreg as outpatient.   -ZULMA on CKD with hyperkalemia.   -NSTEMI type 2 in setting of above, troponin not c/w primary ACS. No plans for further coronary evaluation.   -Chronic HFpEF, Echo 11/24/2023 with LVEF 60-65% with normal wall motion, grade I diastolic dysfunction  -Moderate pericardial effusion.   -Metastatic Right pleural effusion.   -Hx PE 11/2023.   -Stage IV adenocarcinoma lung, on chemo.   -HTN        Consult by Dr. Velásquez     Plan:       I saw, evaluated, interviewed and examined the patient personally.  Confused  Hemodynamically stable. HR 100s and BP 100s as well. No decompensated heart failure on exam  Currently acceptable with physiologic stress  Heparin on hold because of pancytopenia  Echo with normal EF  Continue IV amiodarone.  Once oral route is available, would consider low-dose AV jorge blocking agent  Overall poor prognosis with metastatic cancer    Dario Colindres MD       -AF rates improved currently in the low 100s, currently on IV amio 0.5 mg, continue for now. Goal HR < 120 bpm in setting of sepsis.   -Heparin gtt for stroke prophylaxis, hx PE currently being held d/t pancytopenia.   -Limited Echo pending.   -Continue supportive care.    Subjective:     No new complaints. Remains confused. NAD.     Objective:      Patient Vitals for the past 8 hrs:   Temp Pulse Resp BP SpO2   01/03/24 0733 97.5 °F (36.4 °C) 86 20 107/67 99 %         Patient Vitals for the past 96 hrs:   Weight   01/02/24 0922 61.7 kg (136 lb)   01/01/24 1206 62 kg (136 lb 11.2 oz)   01/01/24 1201 61.7 kg (136 lb)       TELE: AF               Current Facility-Administered  Medications   Medication Dose Route Frequency    filgrastim-aafi (NIVESTYM) injection 300 mcg  300 mcg SubCUTAneous Daily    vancomycin (VANCOCIN) 1,000 mg in sodium chloride 0.9 % 250 mL (vial-mate) IVPB  1,000 mg IntraVENous Once    polyethylene glycol (GLYCOLAX) packet 17 g  17 g Oral Daily    cefepime (MAXIPIME) 1,000 mg in sodium chloride 0.9 % 50 mL IVPB (mini-bag)  1,000 mg IntraVENous Q12H    vancomycin (VANCOCIN) intermittent dosing (placeholder)   Other RX Placeholder    sodium chloride flush 0.9 % injection 5-40 mL  5-40 mL IntraVENous 2 times per day    sodium chloride flush 0.9 % injection 5-40 mL  5-40 mL IntraVENous PRN    ondansetron (ZOFRAN-ODT) disintegrating tablet 4 mg  4 mg Oral Q8H PRN    Or    ondansetron (ZOFRAN) injection 4 mg  4 mg IntraVENous Q6H PRN    polyethylene glycol (GLYCOLAX) packet 17 g  17 g Oral Daily PRN    acetaminophen (TYLENOL) tablet 650 mg  650 mg Oral Q6H PRN    Or    acetaminophen (TYLENOL) suppository 650 mg  650 mg Rectal Q6H PRN    dextrose bolus 10% 250 mL  250 mL IntraVENous Once    glucose chewable tablet 16 g  4 tablet Oral PRN    dextrose bolus 10% 125 mL  125 mL IntraVENous PRN    Or    dextrose bolus 10% 250 mL  250 mL IntraVENous PRN    glucagon injection 1 mg  1 mg SubCUTAneous PRN    dextrose 10 % infusion   IntraVENous Continuous PRN    amiodarone (NEXTERONE) 360 mg in dextrose 5% 200 ml  0.5 mg/min IntraVENous Continuous    heparin (porcine) injection 4,960 Units  80 Units/kg IntraVENous PRN    heparin (porcine) injection 2,480 Units  40 Units/kg IntraVENous PRN    [Held by provider] heparin 25,000 units in dextrose 5% 250 mL (premix) infusion  5-30 Units/kg/hr IntraVENous Continuous    famotidine (PEPCID) 20 mg in sodium chloride (PF) 0.9 % 10 mL injection  20 mg IntraVENous Daily       No intake or output data in the 24 hours ending 01/03/24 1005    Physical Exam:  General:  alert, appears stated age, cooperative, no distress, and confused  Neck:  no

## 2024-01-03 NOTE — PLAN OF CARE
Problem: Physical Therapy - Adult  Goal: By Discharge: Performs mobility at highest level of function for planned discharge setting.  See evaluation for individualized goals.  Description: Physical Therapy Goals  Initiated 1/3/2024 and to be accomplished within 7 day(s)  1.  Patient will move from supine to sit and sit to supine in bed with modified independence.    2.  Patient will transfer from bed to chair and chair to bed with modified independence using the least restrictive device.  3.  Patient will perform sit to stand with modified independence.  4.  Patient will ambulate with modified independence for 150 feet with the least restrictive device.   5.  Patient will ascend/descend 3 stairs with handrail(s) with supervision/set-up.    PLOF: Lives alone. Independent.   Outcome: Progressing   PHYSICAL THERAPY EVALUATION    Patient: Chad Sorto (73 y.o. male)  Date: 1/3/2024  Primary Diagnosis: Paroxysmal A-fib (HCC) [I48.0]  Sepsis (HCC) [A41.9]  Severe sepsis (HCC) [A41.9, R65.20]  Precautions: Fall Risk, Aspiration Risk, Bed Alarm  ASSESSMENT :  Oriented to self. Decreased insight to deficits and need for assistance. Close supervision for supine to sit. Noted bowel incontinence; transferred to bedside commode with min A. Cues for motor planning. Min A for return transfer. Returned to supine in bed with encouragement. HR elevated to 144 bpm with activity; returned to 112 bpm at end of session. Supine in bed with bed alarm on. SHONA Carrera notified. Call black in reach.     DEFICITS/IMPAIRMENTS:   Body Structures, Functions, Activity Limitations Requiring Skilled Therapeutic Intervention: Decreased functional mobility ;Decreased safe awareness;Decreased cognition;Decreased balance;Decreased strength;Decreased endurance    Patient will benefit from skilled intervention to address the above impairments.  Patient's rehabilitation potential: Therapy Prognosis: Fair.  Factors which may influence rehabilitation  potential include:  []         None noted  []         Mental ability/status  [x]         Medical condition  []         Home/family situation and support systems  []         Safety awareness  []         Pain tolerance/management  []         Other:      PLAN :  Recommendations and Planned Interventions:   [x]           Bed Mobility Training             [x]    Neuromuscular Re-Education  [x]           Transfer Training                   []    Orthotic/Prosthetic Training  [x]           Gait Training                          []    Modalities  [x]           Therapeutic Exercises           []    Edema Management/Control  [x]           Therapeutic Activities            [x]    Family Training/Education  [x]           Patient Education  []           Other (comment):    Frequency/Duration: Patient will be followed by physical therapy 1-2 times per day/3-5 days per week to address goals.    Further Equipment Recommendations for Discharge:  TBD with amb    AM-PAC Inpatient Mobility Raw Score : 17     This [facility] score should be considered in conjunction with interdisciplinary team recommendations to determine the most appropriate discharge setting. Patient's social support, diagnosis, medical stability, and prior level of function should also be taken into consideration.    Current research shows that an AM-PAC score of 17 (13 without stairs) or less is not associated with a discharge to the patient's home setting. Based on above AM-PAC score and current functional mobility deficits, it is recommended that the patient have 3-5 sessions per week of Physical Therapy at d/c to increase the patient's independence.      SUBJECTIVE:   Patient stated “Ok.”    OBJECTIVE DATA SUMMARY:     Past Medical History:   Diagnosis Date    CKD (chronic kidney disease) stage 4, GFR 15-29 ml/min ([facility]) 11/22/2023    Gout     HTN (hypertension)     Primary hypertension 11/22/2023     Past Surgical History:   Procedure Laterality Date    CT NEEDLE

## 2024-01-03 NOTE — PROGRESS NOTES
Comprehensive Nutrition Assessment    Type and Reason for Visit:  Initial, Positive Nutrition Screen    Nutrition Recommendations/Plan:   Add supplement: Ensure Plus BID (350 kcal, 20 gm protein each)  Modify po diet; downgrade diet consistency to easy to chew given pt having some chewing difficulty with certain foods.   Add daily MVI supplement r/t pt with malnutrition  Continue IVF per MD  Continue all other nutrition interventions. Encourage/ monitor po intake of meals and supplements.      Malnutrition Assessment:  Malnutrition Status:  Severe malnutrition (01/03/24 1453)    Context:  Acute Illness     Findings of the 6 clinical characteristics of malnutrition:  Energy Intake:  No significant decrease in energy intake  Weight Loss:  Greater than 5% over 1 month     Body Fat Loss:  Moderate body fat loss Triceps, Fat Overlying Ribs   Muscle Mass Loss:  Moderate muscle mass loss Clavicles (pectoralis & deltoids), Calf (gastrocnemius), Hand (interosseous)  Fluid Accumulation:  No significant fluid accumulation     Strength:  Not Performed    Nutrition History and Allergies:   Past medical hx: CKD stage 4, gout, HTN, newly diagnosed stage IV adenocarcinoma of lung.  Pt reported having good appetite; usually eats 2 meals daily at home. Pt reported UBW is 120 lb, but question accuracy given past wt trends PTA per chart hx.  Pt weighing 170 lb on 11/24/23,  145 lb 13 oz on 12/18/23,  136 lb upon admission.  Wt loss of 9.8 lb, 6.7% x 2-3 weeks PTA, significant.   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 Pt with wt loss PTA. Is on po diet; ate 75% of breakfast today per RN; only a few bites from lunch due to food being tough. Pt agreeable to having diet consistency downgraded to easy to chew. Agreeable to nutrition supplement. Pt little confused during RD visit.    Nutrition Related Findings:    BM 1/2.  No

## 2024-01-03 NOTE — PROGRESS NOTES
Patient has been confused all shift. Attempting to get out of bed, pulling off telemetry leads multiple times. Received a one time dose of haldol from on call  Patient calmed down for a few hours, but has now repeated earlier activity and has pulled out his IV site. Also, still trying to get out of the bed. Paged  to get a second dose of haldol. Will continue to monitor.

## 2024-01-03 NOTE — PROGRESS NOTES
Patient: Chad Sorto   MRN: [id number]         CSN: [id number]    YOB: 1950      Admit Date: 2024    Assessment:     Principal Problem:    Severe sepsis (HCC)  Active Problems:    History of pulmonary embolism    Pleural effusion on right    Hypertension    Chronic heart failure with preserved ejection fraction (HCC)    Moderate pulmonary hypertension (HCC)    Metastatic adenocarcinoma (HCC)    Stage IV squamous cell carcinoma of lung (HCC)    LLL pneumonia    Paroxysmal atrial fibrillation (HCC)    Acute kidney injury superimposed on chronic kidney disease (HCC)    Pancytopenia (HCC)    Hypoalbuminemia    History of gout  Resolved Problems:    * No resolved hospital problems. *    Stage IV NSC lung ca,  right malignant pleural effusion,adenoca new dx  Carboplatin, pemetreaxed, pembrolizumab cycle 1 ,23  Pancytopenia post chemo  Poor intake, dehydration, hypotension  Failure to thrive  Confusion  H/o VTE 2023    Plan:     IVF, acc I/O  Neupogen support  Neutropenia precautions  Blood c/s urine c/s pending, IV antibiotics  Renal consult  Lives by himself, niece in [place], out pt attempts made by us with SW intervention regarding supervision and care at home  Palliative care consult, completion of AMD    Nahomi Fowler MD  [facility]  Office [phone]      Subjective:     Noted EMS and ER notes      Objective:     /67   Pulse 86   Temp 97.5 °F (36.4 °C) (Axillary)   Resp 20   Ht 1.778 m (5' 10\")   Wt 61.7 kg (136 lb)   SpO2 99%   BMI 19.51 kg/m²           Temp (24hrs), Av.7 °F (36.5 °C), Min:97.5 °F (36.4 °C), Max:98.2 °F (36.8 °C)      No intake or output data in the 24 hours ending 24 0835  Review of Systems:   Constitutional: negative for fevers, chills, sweats and fatigue  Eyes: negative for visual disturbance,  icterus  Ears, Nose, Mouth, Throat, and Face: negative for tinnitus, epistaxis, sore mouth and hoarseness  Respiratory:     Neutrophils Absolute DIFFERENTIAL NOT PERFORMED WHEN WBC IS <0.5 1.8 - 8.0 K/UL    Lymphocytes Absolute DIFFERENTIAL NOT PERFORMED WHEN WBC IS <0.5 0.9 - 3.6 K/UL    Monocytes Absolute DIFFERENTIAL NOT PERFORMED WHEN WBC IS <0.5 0.05 - 1.2 K/UL    Eosinophils Absolute DIFFERENTIAL NOT PERFORMED WHEN WBC IS <0.5 0.0 - 0.4 K/UL    Basophils Absolute DIFFERENTIAL NOT PERFORMED WHEN WBC IS <0.5 0.0 - 0.1 K/UL    Absolute Immature Granulocyte DIFFERENTIAL NOT PERFORMED WHEN WBC IS <0.5 0.00 - 0.04 K/UL    Differential Type DIFFERENTIAL NOT PERFORMED WHEN WBC IS <0.5      Platelet Comment DECREASED PLATELETS      RBC Comment ANISOCYTOSIS  1+       Renal Function Panel    Collection Time: 01/03/24  4:38 AM   Result Value Ref Range    Sodium 148 (H) 136 - 145 mmol/L    Potassium 4.4 3.5 - 5.5 mmol/L    Chloride 127 (H) 100 - 111 mmol/L    CO2 14 (L) 21 - 32 mmol/L    Anion Gap 7 3.0 - 18 mmol/L    Glucose 111 (H) 74 - 99 mg/dL     (H) 7.0 - 18 MG/DL    Creatinine 2.90 (H) 0.6 - 1.3 MG/DL    Bun/Cre Ratio 36 (H) 12 - 20      Est, Glom Filt Rate 22 (L) >60 ml/min/1.73m2    Calcium 8.6 8.5 - 10.1 MG/DL    Phosphorus 3.1 2.5 - 4.9 MG/DL    Albumin 2.2 (L) 3.4 - 5.0 g/dL   Magnesium    Collection Time: 01/03/24  4:38 AM   Result Value Ref Range    Magnesium 2.7 (H) 1.6 - 2.6 mg/dL   Vancomycin Level, Random    Collection Time: 01/03/24  4:38 AM   Result Value Ref Range    Vancomycin Rm 13.8 5.0 - 40.0 UG/ML   POCT Glucose    Collection Time: 01/03/24  7:37 AM   Result Value Ref Range    POC Glucose 152 (H) 70 - 110 mg/dL

## 2024-01-03 NOTE — CONSULTS
Infectious Disease Consultation Note        Reason: sepsis     Current abx Prior abx   Cefepime since 1/2  Vancomycin since 1/1 Pip/tazo 1/1     Lines:       Assessment :  74y/o Male with a PMHx of stage IV non-small cell lung cancer, malignant pleural effusion, newly diagnosed adenocarcinoma on chemotherapy-last treatment 12/19/2023, Pulmonary hypertension, CKD, Gout, and recent Acute PE along with PTX who presented to [facility] ED on 1/1/24 due to altered mental status.    Clinical presentation consistent with sepsis-present on admission due to left lower lobe pneumonia superimposed on underlying lung malignancy, probable cystitis  Exact microbial etiology of left lower lung pneumonia not entirely clear- ?  Aspiration pneumonia versus community-acquired pneumonia    Probable cystitis 4+ bacteria noted on UA 1/1,hematuria concerning for cystitis.  Patient may not have significant pyuria due to concurrent neutropenic state    Neutropenia/thrombocytopenia-likely secondary to recent chemotherapy    Acute on chronic kidney injury-likely due to volume depletion    Altered mentation-likely metabolic encephalopathy.  Cefepime can contribute to this too    Recommendations:    Continue cefepime  Will d/c vancomycin in am based on cultures, subsequent course  Obtain nasal MRSA swab; add on urine culture to urine analysis 1/1-discussed with lab  Follow-up oncology recommendations regarding further chemotherapy, goals of care, neutropenia  Follow-up nephrology recommendations  Monitor CBC, temperature, procalcitonin, clinically      Thank you for consultation request. Above plan was discussed in details with primary team. Please call me if any further questions or concerns. Will continue to participate in the care of this patient.  HPI:    74y/o Male with a PMHx of stage IV non-small cell lung cancer, malignant pleural effusion, newly diagnosed adenocarcinoma on chemotherapy-last treatment 12/19/2023, Pulmonary hypertension, CKD,

## 2024-01-03 NOTE — PROGRESS NOTES
[facility]   Pharmacy Pharmacokinetic Monitoring Service - Vancomycin    Indication: sepsis, neutropenic  Goal level: 10-20 mg/L  Day of Therapy: 3  Additional Antimicrobials: cefepime    Pertinent Laboratory Values:   Wt Readings from Last 1 Encounters:   01/02/24 61.7 kg (136 lb)     Temp Readings from Last 1 Encounters:   01/03/24 97.5 °F (36.4 °C) (Axillary)     Estimated Creatinine Clearance: 19 mL/min (A) (based on SCr of 2.9 mg/dL (H)).    Recent Labs     01/02/24  0748 01/02/24  0825 01/03/24  0438   CREATININE 2.79*  --  2.90*   *  --  103*   WBC  --  0.5* 0.5*     Pertinent Cultures:  Date Source Results   1/1 blood NGTD   MRSA Nasal Swab: pending    Assessment:  Date Current Dose Level (mg/L) Timing of Level (h)   1/1 1,500 mg x1 - -   1/2 - - -   1/3 - 13.8 39     Plan:  Dose by level  Vancomycin 1000 mg x 1 today  Random level ordered for 1/5 with AM labs  Pharmacy will continue to monitor patient and adjust therapy as indicated    Thank you for the consult,  DAI ELIZABETH, [facility]  1/3/2024

## 2024-01-03 NOTE — PLAN OF CARE
Problem: Occupational Therapy - Adult  Goal: By Discharge: Performs self-care activities at highest level of function for planned discharge setting.  See evaluation for individualized goals.  Description: Occupational Therapy Goals:  Initiated 1/3/2024 to be met within 7-10 days.    1.  Patient will perform grooming with supervision/set-up while standing with Good balance.   2.  Patient will perform self-feeding with supervision/set-up using AE prn.  3.  Patient will perform lower body dressing with supervision/set-up.  4.  Patient will perform toilet transfers with supervision/set-up.  5.  Patient will perform all aspects of toileting with supervision/set-up.  6.  Patient will participate in upper extremity therapeutic exercise/activities with supervision/set-up for 8 minutes to improve endurance and UB strength needed for ADLs    7.  Patient will utilize energy conservation techniques during functional activities with verbal cues.    PLOF: Pt lives alone, independent.    Outcome: Progressing  OCCUPATIONAL THERAPY EVALUATION    Patient: Chad Sorto (73 y.o. male)  Date: 1/3/2024  Primary Diagnosis: Paroxysmal A-fib (HCC) [I48.0]  Sepsis (HCC) [A41.9]  Severe sepsis (HCC) [A41.9, R65.20]       Precautions: Fall Risk, Aspiration Risk, Bed Alarm    ASSESSMENT :    Pt cleared to participate in OT evaluation by RN. Upon entering room, pt received in bed, alert, and agreeable to OT eval/treatment. Pt seen in conjunction with PT to maximize safety of patient and staff members. Pt is confused, follows simple commands, however, requires frequent redirection to task due to being easily distracted. Pt with arthritic joint deformities in bilateral hands, affecting his efficiency with all ADLs requiring FMC. Pt performed functional txfr to BSC with SPT technique with Min A for safety and Max cues for sequencing and to redirect to task as pt attempting pull down/up pants during txfr (pt did not have pants on). Pt returned  made  Insights: Decreased awareness of deficits  Initiation: Requires cues for some  Sequencing: Requires cues for some    Skin: visible skin intact  Edema: none noted    Vision/Perceptual:    Vision  Vision: Impaired        Coordination: BUE  Coordination: Grossly decreased, non-functional (joint contractures affecting FMC)        Balance:     Balance  Sitting: Intact  Standing: Impaired  Standing - Static: Fair  Standing - Dynamic: Fair    Strength: BUE  Strength: Generally decreased, functional    Tone & Sensation: BUE  Tone: Normal  Sensation: Impaired    Range of Motion: BUE  AROM: Grossly decreased, non-functional (mult  arthritic joint deformities in both hands)  PROM: Grossly decreased, non-functional      Functional Mobility and Transfers for ADLs:  Bed Mobility:     Bed Mobility Training  Bed Mobility Training: Yes  Supine to Sit: Stand-by assistance  Sit to Supine: Stand-by assistance  Scooting: Stand-by assistance  Transfers:      Transfer Training  Transfer Training: Yes  Sit to Stand: Minimum assistance  Stand to Sit: Minimum assistance  Toilet Transfer: Minimum assistance    ADL Assessment:   Feeding: Minimal assistance  Grooming: Minimal assistance  UE Bathing: Minimal assistance  LE Bathing: Moderate assistance  UE Dressing: Minimal assistance  LE Dressing: Moderate assistance  Toileting: Moderate assistance  Functional Mobility: Minimal assistance        ADL Intervention:  BSC txfr with Min A  Toileting hygiene with Mod A in standing  Max A for socks management in sitting  Pain:  Pain level pre-treatment: not rated  Pain level post-treatment: not rated      Activity Tolerance:   Activity Tolerance: Treatment limited secondary to decreased cognition  Please refer to the flowsheet for vital signs taken during this treatment.    After treatment:   [] Patient left in no apparent distress sitting up in chair  [x] Patient left in no apparent distress in bed  [x] Call bell left within reach  [x] Nursing

## 2024-01-04 PROBLEM — I95.89 HYPOTENSION DUE TO HYPOVOLEMIA: Status: ACTIVE | Noted: 2024-01-04

## 2024-01-04 PROBLEM — C34.90 MALIGNANT NEOPLASM OF LUNG (HCC): Status: ACTIVE | Noted: 2024-01-04

## 2024-01-04 PROBLEM — R53.81 DEBILITY: Status: ACTIVE | Noted: 2024-01-04

## 2024-01-04 PROBLEM — E86.1 HYPOTENSION DUE TO HYPOVOLEMIA: Status: ACTIVE | Noted: 2024-01-04

## 2024-01-04 PROBLEM — N18.32 STAGE 3B CHRONIC KIDNEY DISEASE (HCC): Status: ACTIVE | Noted: 2024-01-04

## 2024-01-04 LAB
ALBUMIN SERPL-MCNC: 2 G/DL (ref 3.4–5)
ALBUMIN SERPL-MCNC: 2.1 G/DL (ref 3.4–5)
ALBUMIN/GLOB SERPL: 0.5 (ref 0.8–1.7)
ALP SERPL-CCNC: 50 U/L (ref 45–117)
ALT SERPL-CCNC: 14 U/L (ref 16–61)
ANION GAP SERPL CALC-SCNC: 6 MMOL/L (ref 3–18)
ANION GAP SERPL CALC-SCNC: 9 MMOL/L (ref 3–18)
APTT PPP: 49.2 SEC (ref 23–36.4)
AST SERPL-CCNC: 10 U/L (ref 10–38)
BACTERIA SPEC CULT: NORMAL
BASOPHILS # BLD: 0 K/UL (ref 0–0.1)
BASOPHILS # BLD: 0 K/UL (ref 0–0.1)
BASOPHILS NFR BLD: 0 % (ref 0–2)
BASOPHILS NFR BLD: 0 % (ref 0–2)
BILIRUB SERPL-MCNC: 1.1 MG/DL (ref 0.2–1)
BUN SERPL-MCNC: 80 MG/DL (ref 7–18)
BUN SERPL-MCNC: 97 MG/DL (ref 7–18)
BUN/CREAT SERPL: 31 (ref 12–20)
BUN/CREAT SERPL: 36 (ref 12–20)
CALCIUM SERPL-MCNC: 8.3 MG/DL (ref 8.5–10.1)
CALCIUM SERPL-MCNC: 8.6 MG/DL (ref 8.5–10.1)
CHLORIDE SERPL-SCNC: 122 MMOL/L (ref 100–111)
CHLORIDE SERPL-SCNC: 123 MMOL/L (ref 100–111)
CO2 SERPL-SCNC: 18 MMOL/L (ref 21–32)
CO2 SERPL-SCNC: 21 MMOL/L (ref 21–32)
CREAT SERPL-MCNC: 2.61 MG/DL (ref 0.6–1.3)
CREAT SERPL-MCNC: 2.68 MG/DL (ref 0.6–1.3)
DIFFERENTIAL METHOD BLD: ABNORMAL
DIFFERENTIAL METHOD BLD: ABNORMAL
EOSINOPHIL # BLD: 0 K/UL (ref 0–0.4)
EOSINOPHIL # BLD: 0 K/UL (ref 0–0.4)
EOSINOPHIL NFR BLD: 0 % (ref 0–5)
EOSINOPHIL NFR BLD: 0 % (ref 0–5)
ERYTHROCYTE [DISTWIDTH] IN BLOOD BY AUTOMATED COUNT: 13.5 % (ref 11.6–14.5)
ERYTHROCYTE [DISTWIDTH] IN BLOOD BY AUTOMATED COUNT: 15.5 % (ref 11.6–14.5)
GLOBULIN SER CALC-MCNC: 3.7 G/DL (ref 2–4)
GLUCOSE BLD STRIP.AUTO-MCNC: 151 MG/DL (ref 70–110)
GLUCOSE BLD STRIP.AUTO-MCNC: 158 MG/DL (ref 70–110)
GLUCOSE BLD STRIP.AUTO-MCNC: 162 MG/DL (ref 70–110)
GLUCOSE SERPL-MCNC: 128 MG/DL (ref 74–99)
GLUCOSE SERPL-MCNC: 163 MG/DL (ref 74–99)
HCT VFR BLD AUTO: 19.3 % (ref 36–48)
HCT VFR BLD AUTO: 23.5 % (ref 36–48)
HGB BLD-MCNC: 6.5 G/DL (ref 13–16)
HGB BLD-MCNC: 8.1 G/DL (ref 13–16)
HISTORY CHECK: NORMAL
HISTORY CHECK: NORMAL
IMM GRANULOCYTES # BLD AUTO: 0 K/UL
IMM GRANULOCYTES # BLD AUTO: 0 K/UL (ref 0–0.04)
IMM GRANULOCYTES NFR BLD AUTO: 0 %
IMM GRANULOCYTES NFR BLD AUTO: 0 % (ref 0–0.5)
INR PPP: 1.4 (ref 0.9–1.1)
LACTATE SERPL-SCNC: 1.7 MMOL/L (ref 0.4–2)
LYMPHOCYTES # BLD: 0.3 K/UL (ref 0.9–3.6)
LYMPHOCYTES # BLD: 0.4 K/UL (ref 0.9–3.6)
LYMPHOCYTES NFR BLD: 48 % (ref 21–52)
LYMPHOCYTES NFR BLD: 58 % (ref 21–52)
MAGNESIUM SERPL-MCNC: 2.3 MG/DL (ref 1.6–2.6)
MCH RBC QN AUTO: 27.2 PG (ref 24–34)
MCH RBC QN AUTO: 29 PG (ref 24–34)
MCHC RBC AUTO-ENTMCNC: 33.7 G/DL (ref 31–37)
MCHC RBC AUTO-ENTMCNC: 34.5 G/DL (ref 31–37)
MCV RBC AUTO: 80.8 FL (ref 78–100)
MCV RBC AUTO: 84.2 FL (ref 78–100)
MONOCYTES # BLD: 0 K/UL (ref 0.05–1.2)
MONOCYTES # BLD: 0 K/UL (ref 0.05–1.2)
MONOCYTES NFR BLD: 4 % (ref 3–10)
MONOCYTES NFR BLD: 6 % (ref 3–10)
NEUTS SEG # BLD: 0.2 K/UL (ref 1.8–8)
NEUTS SEG # BLD: 0.4 K/UL (ref 1.8–8)
NEUTS SEG NFR BLD: 36 % (ref 40–73)
NEUTS SEG NFR BLD: 48 % (ref 40–73)
NRBC # BLD: 0 K/UL (ref 0–0.01)
NRBC # BLD: 0 K/UL (ref 0–0.01)
NRBC BLD-RTO: 0 PER 100 WBC
NRBC BLD-RTO: 0 PER 100 WBC
OSMOLALITY UR: 369 MOSM/KG H2O
PHOSPHATE SERPL-MCNC: 2.4 MG/DL (ref 2.5–4.9)
PLATELET # BLD AUTO: 11 K/UL (ref 135–420)
PLATELET # BLD AUTO: 18 K/UL (ref 135–420)
PLATELET COMMENT: ABNORMAL
PLATELET COMMENT: ABNORMAL
PMV BLD AUTO: 11 FL (ref 9.2–11.8)
PMV BLD AUTO: 11.8 FL (ref 9.2–11.8)
POTASSIUM SERPL-SCNC: 4.3 MMOL/L (ref 3.5–5.5)
POTASSIUM SERPL-SCNC: 4.3 MMOL/L (ref 3.5–5.5)
POTASSIUM UR-SCNC: 12 MMOL/L (ref 12–62)
PROCALCITONIN SERPL-MCNC: 3.34 NG/ML
PROT SERPL-MCNC: 5.7 G/DL (ref 6.4–8.2)
PROTHROMBIN TIME: 17.1 SEC (ref 11.9–14.7)
RBC # BLD AUTO: 2.39 M/UL (ref 4.35–5.65)
RBC # BLD AUTO: 2.79 M/UL (ref 4.35–5.65)
RBC MORPH BLD: ABNORMAL
RBC MORPH BLD: ABNORMAL
SERVICE CMNT-IMP: NORMAL
SERVICE CMNT-IMP: NORMAL
SODIUM SERPL-SCNC: 147 MMOL/L (ref 136–145)
SODIUM SERPL-SCNC: 152 MMOL/L (ref 136–145)
SODIUM UR-SCNC: 70 MMOL/L (ref 20–110)
TOTAL CELLS COUNTED SPEC: 50
WBC # BLD AUTO: 0.6 K/UL (ref 4.6–13.2)
WBC # BLD AUTO: 0.7 K/UL (ref 4.6–13.2)
WBC MORPH BLD: ABNORMAL

## 2024-01-04 PROCEDURE — 36430 TRANSFUSION BLD/BLD COMPNT: CPT

## 2024-01-04 PROCEDURE — 85730 THROMBOPLASTIN TIME PARTIAL: CPT

## 2024-01-04 PROCEDURE — 80053 COMPREHEN METABOLIC PANEL: CPT

## 2024-01-04 PROCEDURE — 83735 ASSAY OF MAGNESIUM: CPT

## 2024-01-04 PROCEDURE — 85025 COMPLETE CBC W/AUTO DIFF WBC: CPT

## 2024-01-04 PROCEDURE — 2500000003 HC RX 250 WO HCPCS: Performed by: PHYSICIAN ASSISTANT

## 2024-01-04 PROCEDURE — 6360000002 HC RX W HCPCS: Performed by: INTERNAL MEDICINE

## 2024-01-04 PROCEDURE — 86850 RBC ANTIBODY SCREEN: CPT

## 2024-01-04 PROCEDURE — 80069 RENAL FUNCTION PANEL: CPT

## 2024-01-04 PROCEDURE — P9016 RBC LEUKOCYTES REDUCED: HCPCS

## 2024-01-04 PROCEDURE — 92610 EVALUATE SWALLOWING FUNCTION: CPT

## 2024-01-04 PROCEDURE — 85610 PROTHROMBIN TIME: CPT

## 2024-01-04 PROCEDURE — 2580000003 HC RX 258: Performed by: INTERNAL MEDICINE

## 2024-01-04 PROCEDURE — 86901 BLOOD TYPING SEROLOGIC RH(D): CPT

## 2024-01-04 PROCEDURE — 6370000000 HC RX 637 (ALT 250 FOR IP): Performed by: INTERNAL MEDICINE

## 2024-01-04 PROCEDURE — 99233 SBSQ HOSP IP/OBS HIGH 50: CPT | Performed by: INTERNAL MEDICINE

## 2024-01-04 PROCEDURE — 82962 GLUCOSE BLOOD TEST: CPT

## 2024-01-04 PROCEDURE — 2580000003 HC RX 258: Performed by: PHYSICIAN ASSISTANT

## 2024-01-04 PROCEDURE — 1100000000 HC RM PRIVATE

## 2024-01-04 PROCEDURE — 86923 COMPATIBILITY TEST ELECTRIC: CPT

## 2024-01-04 PROCEDURE — 83605 ASSAY OF LACTIC ACID: CPT

## 2024-01-04 PROCEDURE — 86900 BLOOD TYPING SEROLOGIC ABO: CPT

## 2024-01-04 PROCEDURE — 99223 1ST HOSP IP/OBS HIGH 75: CPT | Performed by: NURSE PRACTITIONER

## 2024-01-04 PROCEDURE — 84145 PROCALCITONIN (PCT): CPT

## 2024-01-04 PROCEDURE — A4216 STERILE WATER/SALINE, 10 ML: HCPCS | Performed by: PHYSICIAN ASSISTANT

## 2024-01-04 PROCEDURE — 2500000003 HC RX 250 WO HCPCS: Performed by: INTERNAL MEDICINE

## 2024-01-04 PROCEDURE — 36415 COLL VENOUS BLD VENIPUNCTURE: CPT

## 2024-01-04 RX ORDER — MIDODRINE HYDROCHLORIDE 10 MG/1
10 TABLET ORAL
Status: DISCONTINUED | OUTPATIENT
Start: 2024-01-04 | End: 2024-01-08

## 2024-01-04 RX ORDER — DIGOXIN 0.25 MG/ML
250 INJECTION INTRAMUSCULAR; INTRAVENOUS ONCE
Status: COMPLETED | OUTPATIENT
Start: 2024-01-04 | End: 2024-01-04

## 2024-01-04 RX ORDER — SODIUM CHLORIDE, SODIUM LACTATE, POTASSIUM CHLORIDE, CALCIUM CHLORIDE 600; 310; 30; 20 MG/100ML; MG/100ML; MG/100ML; MG/100ML
INJECTION, SOLUTION INTRAVENOUS ONCE
Status: COMPLETED | OUTPATIENT
Start: 2024-01-04 | End: 2024-01-04

## 2024-01-04 RX ORDER — METRONIDAZOLE 500 MG/100ML
500 INJECTION, SOLUTION INTRAVENOUS EVERY 12 HOURS
Status: DISCONTINUED | OUTPATIENT
Start: 2024-01-04 | End: 2024-01-05

## 2024-01-04 RX ORDER — SODIUM CHLORIDE 9 MG/ML
INJECTION, SOLUTION INTRAVENOUS PRN
Status: DISCONTINUED | OUTPATIENT
Start: 2024-01-04 | End: 2024-01-07 | Stop reason: ALTCHOICE

## 2024-01-04 RX ORDER — METOPROLOL TARTRATE 1 MG/ML
5 INJECTION, SOLUTION INTRAVENOUS ONCE
Status: COMPLETED | OUTPATIENT
Start: 2024-01-04 | End: 2024-01-04

## 2024-01-04 RX ADMIN — DIBASIC SODIUM PHOSPHATE, MONOBASIC POTASSIUM PHOSPHATE AND MONOBASIC SODIUM PHOSPHATE 1 TABLET: 852; 155; 130 TABLET ORAL at 11:38

## 2024-01-04 RX ADMIN — SODIUM CHLORIDE, POTASSIUM CHLORIDE, SODIUM LACTATE AND CALCIUM CHLORIDE: 600; 310; 30; 20 INJECTION, SOLUTION INTRAVENOUS at 16:05

## 2024-01-04 RX ADMIN — SODIUM BICARBONATE 650 MG: 650 TABLET ORAL at 11:37

## 2024-01-04 RX ADMIN — METRONIDAZOLE 500 MG: 500 INJECTION, SOLUTION INTRAVENOUS at 16:01

## 2024-01-04 RX ADMIN — SODIUM CHLORIDE, PRESERVATIVE FREE 20 MG: 5 INJECTION INTRAVENOUS at 08:58

## 2024-01-04 RX ADMIN — AMIODARONE HYDROCHLORIDE 0.5 MG/MIN: 1.8 INJECTION, SOLUTION INTRAVENOUS at 04:24

## 2024-01-04 RX ADMIN — Medication 1 TABLET: at 08:58

## 2024-01-04 RX ADMIN — METOPROLOL TARTRATE 5 MG: 5 INJECTION INTRAVENOUS at 17:51

## 2024-01-04 RX ADMIN — AMIODARONE HYDROCHLORIDE 0.5 MG/MIN: 1.8 INJECTION, SOLUTION INTRAVENOUS at 16:26

## 2024-01-04 RX ADMIN — CEFEPIME 1000 MG: 1 INJECTION, POWDER, FOR SOLUTION INTRAMUSCULAR; INTRAVENOUS at 00:42

## 2024-01-04 RX ADMIN — FILGRASTIM-AAFI 300 MCG: 300 INJECTION, SOLUTION SUBCUTANEOUS at 08:58

## 2024-01-04 RX ADMIN — DIGOXIN 250 MCG: 0.25 INJECTION INTRAMUSCULAR; INTRAVENOUS at 14:14

## 2024-01-04 RX ADMIN — SODIUM BICARBONATE: 84 INJECTION, SOLUTION INTRAVENOUS at 16:02

## 2024-01-04 RX ADMIN — CEFEPIME 1000 MG: 1 INJECTION, POWDER, FOR SOLUTION INTRAMUSCULAR; INTRAVENOUS at 11:38

## 2024-01-04 ASSESSMENT — PAIN SCALES - PAIN ASSESSMENT IN ADVANCED DEMENTIA (PAINAD)
CONSOLABILITY: 1
BODYLANGUAGE: 0
TOTALSCORE: 0
FACIALEXPRESSION: 0
NEGVOCALIZATION: 0
BREATHING: 0
CONSOLABILITY: 0
CONSOLABILITY: 0
FACIALEXPRESSION: 0
TOTALSCORE: 0
BREATHING: 0
BODYLANGUAGE: 1
TOTALSCORE: 3
BREATHING: 0
BODYLANGUAGE: 0
NEGVOCALIZATION: 0
FACIALEXPRESSION: 1
NEGVOCALIZATION: 0

## 2024-01-04 ASSESSMENT — PAIN SCALES - GENERAL: PAINLEVEL_OUTOF10: 3

## 2024-01-04 NOTE — CONSULTS
Palliative Medicine  [facility]: [phone]  [facility]: [phone]   [facility]: [phone]    Patient Name: Chad Sorto  YOB: 1950    Date of Initial Consult: 1/4/2024  Reason for Consult: goals of care   Requesting Provider: Dr Fowler    Primary Care Physician: No primary care provider on file.      SUMMARY:   Chad Sorto is a 73 y.o. year old with a past history of Stage IV NSCLC, pulmonary hypertension, CKD, gout, recent acute PE who was admitted on 1/1/2024 from home with a diagnosis of sepsis. Current medical issues leading to Palliative Medicine involvement include: 73-year-old male with stage IV non-small lung cancer who was found at home poorly responsive.  He was admitted to the hospital on 1/1/2024 initially to the ICU with sepsis was found to be in a flutter A-fib with RVR he was started on heparin drip for pulmonary embolus started on IV antibiotics.  Palliative medicine is consulted for goals of care discussions.     PALLIATIVE DIAGNOSES:   Goals of care/advance care plan discussions  Sepsis  Stage IV lung cancer  ZULMA  Debility       PLAN:   Goals of care/advance care plan discussions   1/4/2024  seen along with Ms Angel RN.  Patient with eyes open however he did not track me in room he did not follow commands no verbalization.  He appears ill.  Seen along with speech therapy currently not safe for speech assessment he has been made n.p.o.  Patient is unable to participate in his goals of care discussions.  We note that he has an AMD on file naming his niece Elodia is medical power of  and other niece Ericka secondary medical power of .  We called Elodia today patient lives alone they do check up on patient.  Medical update given including the patient is very ill.  Both Elodia and son do understand that they are medical decision makers.  Goals of care discussed at length including the benefits and burdens of these  IntraVENous Q12H    sodium chloride flush 0.9 % injection 5-40 mL  5-40 mL IntraVENous 2 times per day    sodium chloride flush 0.9 % injection 5-40 mL  5-40 mL IntraVENous PRN    ondansetron (ZOFRAN-ODT) disintegrating tablet 4 mg  4 mg Oral Q8H PRN    Or    ondansetron (ZOFRAN) injection 4 mg  4 mg IntraVENous Q6H PRN    polyethylene glycol (GLYCOLAX) packet 17 g  17 g Oral Daily PRN    acetaminophen (TYLENOL) tablet 650 mg  650 mg Oral Q6H PRN    dextrose bolus 10% 250 mL  250 mL IntraVENous Once    glucose chewable tablet 16 g  4 tablet Oral PRN    dextrose bolus 10% 125 mL  125 mL IntraVENous PRN    Or    dextrose bolus 10% 250 mL  250 mL IntraVENous PRN    glucagon injection 1 mg  1 mg SubCUTAneous PRN    dextrose 10 % infusion   IntraVENous Continuous PRN    amiodarone (NEXTERONE) 360 mg in dextrose 5% 200 ml  0.5 mg/min IntraVENous Continuous    heparin (porcine) injection 4,960 Units  80 Units/kg IntraVENous PRN    heparin (porcine) injection 2,480 Units  40 Units/kg IntraVENous PRN    [Held by provider] heparin 25,000 units in dextrose 5% 250 mL (premix) infusion  5-30 Units/kg/hr IntraVENous Continuous    famotidine (PEPCID) 20 mg in sodium chloride (PF) 0.9 % 10 mL injection  20 mg IntraVENous Daily        LAB AND IMAGING FINDINGS:     Lab Results   Component Value Date/Time    WBC 0.6 01/04/2024 03:44 AM    HGB 6.5 01/04/2024 03:44 AM    PLT 18 01/04/2024 03:44 AM     Lab Results   Component Value Date/Time     01/04/2024 03:44 AM    K 4.3 01/04/2024 03:44 AM     01/04/2024 03:44 AM    CO2 18 01/04/2024 03:44 AM    BUN 97 01/04/2024 03:44 AM    MG 2.3 01/04/2024 03:44 AM    PHOS 2.4 01/04/2024 03:44 AM      Lab Results   Component Value Date/Time    GLOB 4.1 01/01/2024 12:29 PM     Lab Results   Component Value Date/Time    INR 1.1 12/15/2023 11:06 AM    APTT 49.2 01/04/2024 03:44 AM      No results found for: \"IRON\", \"TIBC\", \"IBCT\", \"FERR\"   No results found for: \"PH\", \"PCO2\", \"PO2\"  No

## 2024-01-04 NOTE — PROGRESS NOTES
[facility] Hospitalist [facility]  Progress Note    Patient: Chad Sorto Age: 73 y.o. : 1950 MR#: [id number] SSN: [id number]  Date/Time: 1/3/2024    Subjective:     Pt confused. Mutters answers, difficult to understand speech. Not agitated. Appeared comfortable      Assessment/Plan:   73 y o male w/ h/o metastatic adnoca of the lung, recent acute PE, PTX admitted to the ICU after presenting w/ reports of ams, found down. Admitted to the ICU due to afib/flutter w/ hypotension. Downgraded on 24.    -Severe sepsis   -Left lower lobe pna: ID following. On ABx  -probable cystitis: on ABx  -neutropenia/thrombocytopenia  -ZULMA: h/o ckd baseline crat 1.8-2.0. Nephrology following. Creat now in the high 2 range.   -NAGMA: on bicarb gtt  -HyperNa: nephrology following on hypotonic fluids  -Acute metabolic encephalopathy: likely to derangements mentioned, also on Cefepime, no brain mets on head CT  -Afib/flutter w/ RVR: cardiology following: cardioversion X2 in ED, currently on amio gtt. Hemodynamically now stable. Heparin on hold due to pancytopenia  -Stage IV lung adenocarcinoma  -Moderate pericardial effusion  -Normocytic anemia    PLAN:  -Cont abx for now while monitoring cultures  -Bicarb replacement  -Monitor Na,  -Neupogen, neutropenic precautions  -Palliative consult put in  -Monitor platelets currently in the 30-40 K range. No indications for transfusion presently      CODE STATUS: FULL CODE. Guarded prognosis.   Dispo: pt per heme/onc lives alone, niece in [place]. Will need CM involvement for dispo planning.         Additional Notes:      Case discussed with:  []Patient  []Family  [x]Nursing  [x]Case Management  DVT Prophylaxis:  []Lovenox  []Hep SQ  []SCDs  []Coumadin   []On Heparin gtt    Objective:   VS: BP 94/64   Pulse (!) 116   Temp 97.9 °F (36.6 °C) (Oral)   Resp 18   Ht 1.778 m (5' 10\")   Wt 61.7 kg (136 lb)   SpO2 94%   BMI 19.51 kg/m²    Tmax/24hrs: Temp (24hrs),

## 2024-01-04 NOTE — ACP (ADVANCE CARE PLANNING)
Advance Care Planning     General Advance Care Planning (ACP) Conversation    Date of Conversation: 1/4/2024   Conducted with:  Healthcare Decision Maker: Named in Advance Directive or Healthcare Power of  (name) Elodia Hills and  Ericka Danilo     Healthcare Decision Maker:    Primary Decision Maker: Elodia Hills - Nito/Nephew - [phone]    Secondary Decision Maker: Ericka Carrasco - Niece/Nephew - [phone]  Click here to complete Healthcare Decision Makers including selection of the Healthcare Decision Maker Relationship (ie \"Primary\").  Today we documented Decision Maker(s) consistent with ACP documents on file.     Content/Action Overview:    Palliative Medicine Team members, Joellen Samuel NP and this writer for consult visit at bedside. Patient was awake but slow to follow commands to cough or swallow and nonverbal.  Speech Therapy was present at this time to attempt evaluation. Mr Sorto was not able to engage in a goals of care conversation or provide any medical history to our team.     Mr. Chad Sorto is a 72y/o Male with a PMHx of metastatic Adenocarcinoma of the lung on chemotherapy (12/19/23), Pulmonary hypertension, CKD, Gout, and recent Acute PE along with PTX. He was admitted via the ED on 1/1/2024 after being found down in his home with altered mental status and confusion. The last time he was seen well was 12/29.  Patient had tachyarrhythmia and low BP. Per notes this improved after cardioversion x2, IVF, and amiodarone gtt.     Call was placed to MPOMILKA, Elodia JoseStan jo. The role of Palliative was introduced to help families, patients and MPOA make health care decisions.  Elodia confirmed she spoke with Dr. Fowler and provided consent for blood transfusion. Palliative team shared that our team had seen Mr Sorto in Nov and his level of function has greatly declined since that time. Elodia admitted he is not eating well and in down 10 pounds. Brief medical update was provided.  Team then addressed goals of care and code status. Ms. Hills struggled with this decision and wanted to speak with her sister.  Team suggested her link Ericka Carrasco into the phone call which she did. Team spoke to Ms Carrasco and provided the same information.  Palliative team stressed that current care would continue including blood transfusions and antibiotics. Ericka asked about ongoing chemotherapy. Team shared that will be based on functional status and at this time the course is to manage the infection and low blood counts.   The question was asked \"if despite the current medical treatments Mr. Sorto's heart and breathing stops, would the medical staff start CPR or allow him to pass peacefully?\". Either Ericka or Elodia were able to clearly answer the question but shared they don't want him to suffer.  Ms. Hills added she would like time to think it over before she answered and would call back to this department with her answer.   Has ACP document(s) on file - reflects the patient's care preferences  Reviewed DNR/DNI and CRISTIANA, Elodia Hills and Serg Danilo. They want time to consider this decision. Palliative team stressed to both that Mr. Sorto was at risk for intubation and his heart stopping. Discussed the benefits and burdens of intubation and CPR in the event of respiratory decline or cardiopulmonary arrest as it would not be a treatment for recovery.    As of this writing Ms. Hills has not contacted the palliative medicine department.    Patient will remain a FULL CODE AT THIS TIME.     Caterina EARL, RN, PN  Palliative Medicine Inpatient RN  Palliative [facility] Line: [phone]

## 2024-01-04 NOTE — CONSULTS
In Patient Progress note      Admit Date: 1/1/2024      Impression:     #1 acute kidney injury on chronic kidney disease stage IIIb, baseline creatinine close to 1.8-2 range since 2021, etiology appears to be secondary to prerenal state versus tubular injury/acute tubular necrosis.  Prerenal state appears to be secondary to volume depletion in the setting of sepsis and poor intake.  carboplatin /pembrolizumab induced tubular injury / renal acidificaton defect may be contributing as well. Does have 1 gm proteinuria and some microscopic hematuria .  CT abdomen was done without obstructive uropathy but does show right upper pole nonobstructing nephrolithiasis  #2 nongap hyperchloremic metabolic acidosis, suspect secondary to proximal tubular injury from chemotherapy, causing increased bicarb loss.  Needs either p.o. or IV bicarb to replete  #3 hypernatremia secondary to free water deficit  #4 sepsis secondary to pneumonia versus cystitis  #5 recent diagnosis of stage IV nonsmall cell lung cancer with malignant pleural effusion on chemotherapy  #6 cystitis  #7 neutropenia and thrombocytopenia  #8 altered mental status secondary to metabolic encephalopathy, unclear what his underlying baseline status is  #9 CKD3b , baseline creat of ~ 1.80-2 range since 2021     Plan:  #1 strict intake output charting  #2 continue  hypotonic bicarb drip @ 125 cc/hrs   #3 continue p.o. bicarb, will plan to wean off IV bicarb soon  #4 midodrine for hypotension  #5 encourage p.o. intake  #6 PRN albumin/NS bolus for hypotension  #7 monitor electrolytes renal functions daily    Discussed plan with Dr Bhagat     Discussed plan with primary team,     Please call with questions     Servando Patel MD [facility]  Cell [phone]  Pager: [phone]  Fax   [phone]   Subjective:     - No acute over night events.  - respiratory - stable  - hemodynamics - stable, no pressrs  - UOP-ok  - Nutrition -ok    Objective:     BP (!) 102/58   Pulse (!) 141  Comment: MD hayes  Temp 97.9 °F (36.6 °C) (Axillary)   Resp 23   Ht 1.778 m (5' 10\")   Wt 62 kg (136 lb 11 oz)   SpO2 96%   BMI 19.61 kg/m²       Intake/Output Summary (Last 24 hours) at 1/4/2024 1703  Last data filed at 1/4/2024 1346  Gross per 24 hour   Intake 0 ml   Output --   Net 0 ml       Physical Exam:     Patient is in no apparent distress.   HEENT: Head is normocephalic and atraumatic. Pupils are round, equal,   reactive to light. Sclerae are anicteric. Oropharynx clear.   Neck: no cervical lymphadenopathy or thyromegaly.   Lungs: good air entry, clear to auscultation bilaterally.  Cardiovascular system: S1, S2, regular rate and rhythm.  Abdomen: soft, non tender, non distended. Positive bowel sounds.   No hepatosplenomegaly. No abdominal bruits.   Extremities: no clubbing, cyanosis or edema.    Data Review:    Recent Labs     01/04/24  0344   WBC 0.6*   RBC 2.39*   HCT 19.3*   MCV 80.8   MCH 27.2   MCHC 33.7   RDW 13.5   MPV 11.8     Recent Labs     01/01/24  1740 01/02/24  0748 01/03/24  0438 01/04/24  0344   * 120* 103* 97*   K 4.8 4.4 4.4 4.3   * 146* 148* 147*   * 122* 127* 123*   CO2 16* 16* 14* 18*   PHOS 3.6 3.0 3.1 2.4*       Servando Patel MD

## 2024-01-04 NOTE — PLAN OF CARE
PORT PLACEMENT EQUAL OR GREATER THAN 5 YEARS  12/15/2023    IR PORT PLACEMENT EQUAL OR GREATER THAN 5 YEARS 12/15/2023 Jeffrey Sosa MD [facility] IR     Prior Level of Function/Home Situation:  Social/Functional History  Lives With: Alone  Type of Home: House  Home Layout: One level  Home Access: Stairs to enter with rails  Entrance Stairs - Number of Steps: 3  Receives Help From: Family  ADL Assistance: Independent  Ambulation Assistance: Independent  Transfer Assistance: Independent    Daily Assessment:  Baseline Assessment  Temperature Spikes Noted: No  Respiratory Status: Room air  O2 Device: None (Room air)  Communication Observation: Non-verbal  Follows Directions: None  Current Diet : Easy to chew  Current Liquid Diet : Thin  Dentition: Adequate  Patient Positioning: Upright in bed  Baseline Vocal Quality: Aphonic  Volitional Cough: Weak, Congested, Wet    Orientation: unable to verbalize    Oral Assessment:  Oral Motor   Labial: Decreased rate, Decreased seal, Impaired coordination  Dentition: Natural, Intact  Oral Hygiene: Xerostomia  Lingual: Decreased rate, Decreased strength, Incoordinated  Velum: No Impairment  Mandible: No impairment        P.O. Trials:  PO Trials  Neuromuscular Estim Used: No  Assessment Method(s): Observation, Palpation  Patient Position: 60 at HOB  Vocal Quality: Aphonic  Aspiration Signs/Symptoms: Strong cough, Throat clear, Delayed cough/throat clear, Infiltrate on xray  Pharyngeal Phase Characteristics: Poor endurance, Altered vocal quality, Audible swallow, Suspected pharyngeal residue  Effective Modifications: None  Cues for Modifications: Maximal          DYSPHAGIA DIAGNOSIS: Dysphagia Diagnosis: Severe pharyngeal stage dysphagia, Severe oral stage dysphagia    PAIN:  Start of Eval: appeared to be in no pain/discomfort  End of Eval: as above     After treatment:   []            Patient left in no apparent distress sitting up in chair  [x]            Patient left in

## 2024-01-04 NOTE — CARE COORDINATION
MELODY contacted patient deysi Hills, to complete patients' assessment and discuss SNF options. Elodia requested CM send her a list of facilities via email to [email or web address]. Melody sent them while on the phone. Elodia stated she received them and will be reviewing them today. Melody informed Elodia she is off tomorrow but, will follow up over the weekend.     MONY Bentley

## 2024-01-04 NOTE — SIGNIFICANT EVENT
INTERIM UPDATE - 0658 EST on 1/04/2024    Nursing Staff calls to report that AM labs have returned with abnormal findings of Hgb 6.5 g/dL.    Plan:  Defer to Daytime Hospitalist.

## 2024-01-04 NOTE — PROGRESS NOTES
[facility] Medicine  Rapid/Medical Response Team Note      Patient:    Chad Sorto 73 y.o. male, : 1950  Patient MRN: [id number]    Admission Date: 2024   Admission Diagnosis: Paroxysmal A-fib (HCC) [I48.0]  Sepsis (HCC) [A41.9]  Severe sepsis (HCC) [A41.9, R65.20]      RAPID RESPONSE  Rapid response called for: Melena    Pt admitted for severe sepsis 2/2 LLL pneumonia; also with mild ascending colitis and new dx of paroxysmal Afib. Today pt received 1 unit of pRBCs for Hgb 6.5, which finished transfusing immediately prior to the start of the RRT. Nursing reports pt had a very large black tarry liquid bowel movement with blood clots. He was not having diarrhea before this.     On our arrival, pt intermittently responsive to voice and oriented at least to self. POC glucose 162. Pt noted to have large amount of black tarry stool on the pad beneath him. Vitals were initially stable with BP in the 120s/60s and HR in the 90s. HR did increase intermittently to the 130s during the rapid.     Pt not expressing any signs of acute pain, though is not able to talk to us in sentences. Noted to be tremulous, particularly in the right arm. Patient states he feels \"alright\" and nods when asked if he is comfortable. Denies feeling cold.        OBJECTIVE    RRT/MRT start time:   18:25            RRT/MRT end time:   18:50  Vitals:    24 1907   BP: 128/69   Pulse: 82   Resp: 19   Temp: 97.5 °F (36.4 °C)   SpO2: 99%        Physical Exam:  Gen: Tremulous, appears uncomfortable, staring straight ahead but intermittently responsive to voice, feels \"alright\", oriented to self, coughing intermittently  HEENT: normocephalic, atraumatic  CV: rate 100s, irregularly irregular rhythm, no obvious murmurs; extremities slightly cool to touch  Pulm: +coarse breath sounds in all lung fields that clear after coughing fits; no apparent wheezes or crackles  Abd: S/NT/ND, no guarding or rigidity; +large quantity black tarry loose

## 2024-01-04 NOTE — PLAN OF CARE
Problem: Safety - Adult  Goal: Free from fall injury  Outcome: Progressing     Problem: Pain  Goal: Verbalizes/displays adequate comfort level or baseline comfort level  Outcome: Progressing     Problem: Skin/Tissue Integrity  Goal: Absence of new skin breakdown  Description: 1.  Monitor for areas of redness and/or skin breakdown  2.  Assess vascular access sites hourly  3.  Every 4-6 hours minimum:  Change oxygen saturation probe site  4.  Every 4-6 hours:  If on nasal continuous positive airway pressure, respiratory therapy assess nares and determine need for appliance change or resting period.  Outcome: Progressing     Problem: Nutrition Deficit:  Goal: Optimize nutritional status  Outcome: Progressing     Problem: SLP Adult - Impaired Swallowing  Goal: By Discharge: Advance to least restrictive diet without signs or symptoms of aspiration for planned discharge setting.  See evaluation for individualized goals.  Description: Patient will:  1. Tolerate PO trials with 0 s/s overt distress in 4/5 trials  2. Utilize compensatory swallow strategies/maneuvers (decrease bite/sip, size/rate, alt. liq/sol) with min cues in 4/5 trials  3. Perform oral-motor/laryngeal exercises to increase oropharyngeal swallow function with min cues  4. Complete an objective swallow study (i.e., MBSS) to assess swallow integrity, r/o aspiration, and determine of safest LRD, min A    Recommend:   NPO with alternate means of nutrition/hydration vs comfort feeds   Strict aspiration precautions (HOB >30 degrees at all times, Oral care TID)    1/4/2024 1251 by Nenita Sotelo, SLP  Outcome: Progressing     Problem: Safety - Medical Restraint  Goal: Remains free of injury from restraints (Restraint for Interference with Medical Device)  Description: INTERVENTIONS:  1. Determine that other, less restrictive measures have been tried or would not be effective before applying the restraint  2. Evaluate the patient's condition at the time of

## 2024-01-04 NOTE — PROGRESS NOTES
BID    therapeutic multivitamin-minerals 1 tablet  1 tablet Oral Daily    polyethylene glycol (GLYCOLAX) packet 17 g  17 g Oral Daily    cefepime (MAXIPIME) 1,000 mg in sodium chloride 0.9 % 50 mL IVPB (mini-bag)  1,000 mg IntraVENous Q12H    sodium chloride flush 0.9 % injection 5-40 mL  5-40 mL IntraVENous 2 times per day    sodium chloride flush 0.9 % injection 5-40 mL  5-40 mL IntraVENous PRN    ondansetron (ZOFRAN-ODT) disintegrating tablet 4 mg  4 mg Oral Q8H PRN    Or    ondansetron (ZOFRAN) injection 4 mg  4 mg IntraVENous Q6H PRN    polyethylene glycol (GLYCOLAX) packet 17 g  17 g Oral Daily PRN    acetaminophen (TYLENOL) tablet 650 mg  650 mg Oral Q6H PRN    dextrose bolus 10% 250 mL  250 mL IntraVENous Once    glucose chewable tablet 16 g  4 tablet Oral PRN    dextrose bolus 10% 125 mL  125 mL IntraVENous PRN    Or    dextrose bolus 10% 250 mL  250 mL IntraVENous PRN    glucagon injection 1 mg  1 mg SubCUTAneous PRN    dextrose 10 % infusion   IntraVENous Continuous PRN    amiodarone (NEXTERONE) 360 mg in dextrose 5% 200 ml  0.5 mg/min IntraVENous Continuous    heparin (porcine) injection 4,960 Units  80 Units/kg IntraVENous PRN    heparin (porcine) injection 2,480 Units  40 Units/kg IntraVENous PRN    [Held by provider] heparin 25,000 units in dextrose 5% 250 mL (premix) infusion  5-30 Units/kg/hr IntraVENous Continuous    famotidine (PEPCID) 20 mg in sodium chloride (PF) 0.9 % 10 mL injection  20 mg IntraVENous Daily         Intake/Output Summary (Last 24 hours) at 1/4/2024 0956  Last data filed at 1/4/2024 0818  Gross per 24 hour   Intake 0 ml   Output --   Net 0 ml       Physical Exam:  General:  alert, appears stated age, cooperative, no distress, and confused  Neck:  no JVD  Lungs:  diminished BL   Heart:  irregularly irregular rhythm  Abdomen:  abdomen is soft without significant tenderness  Extremities:  extremities normal, atraumatic, no cyanosis or edema    Visit Vitals  BP (!) 101/53   Pulse 85    Temp 97.5 °F (36.4 °C) (Axillary)   Resp 16   Ht 1.778 m (5' 10\")   Wt 62 kg (136 lb 11 oz)   SpO2 96%   BMI 19.61 kg/m²       Data Review:     Labs: Results:       Chemistry Recent Labs     01/01/24  1229 01/01/24  1740 01/02/24  0748 01/03/24  0438 01/04/24  0344      < > 146* 148* 147*   K 6.1*   < > 4.4 4.4 4.3   *   < > 122* 127* 123*   CO2 16*   < > 16* 14* 18*   *   < > 120* 103* 97*   CREATININE 3.64*   < > 2.79* 2.90* 2.68*   MG  --   --  2.5 2.7* 2.3   PHOS  --    < > 3.0 3.1 2.4*   GLOB 4.1*  --   --   --   --     < > = values in this interval not displayed.        CBC w/Diff Recent Labs     01/02/24  0825 01/03/24  0438 01/04/24  0344   WBC 0.5* 0.5* 0.6*   RBC 2.77* 2.68* 2.39*   HGB 7.5* 7.3* 6.5*   HCT 22.7* 22.3* 19.3*   PLT 26* 32* 18*        Cardiac Enzymes Lab Results   Component Value Date/Time    TROPHS 97 01/01/2024 05:40 PM    TROPHS 79 01/01/2024 03:58 PM    TROPHS 105 01/01/2024 12:20 PM      Coagulation Recent Labs     01/02/24  0825 01/04/24  0344   APTT 87.4* 49.2*         Lipid Panel No results found for: \"CHOL\", \"CHOLPOCT\", \"CHOLX\", \"CHLST\", \"CHOLV\", \"841550\", \"HDL\", \"HDLC\", \"LDL\", \"LDLC\", \"237318\", \"VLDLC\", \"VLDL\", \"TGLX\", \"TRIGL\"   BNP Lab Results   Component Value Date/Time    NTPROBNP 417 11/22/2023 08:57 AM      Liver Enzymes Lab Results   Component Value Date    ALT 26 01/01/2024    AST 18 01/01/2024    ALKPHOS 62 01/01/2024    BILITOT 1.1 (H) 01/01/2024      Thyroid Studies No results found for: \"T4\", \"T3RU\", \"TSH\"       Signed By: Caterina Bridges PA-C     January 4, 2024

## 2024-01-04 NOTE — CARE COORDINATION
Case Management Assessment  Initial Evaluation    Date/Time of Evaluation: 1/4/2024 1:51 PM  Assessment Completed by: CONRAD Bentley    If patient is discharged prior to next notation, then this note serves as note for discharge by case management.    Patient Name: Chad Sorto                   YOB: 1950  Diagnosis: Paroxysmal A-fib (HCC) [I48.0]  Sepsis (HCC) [A41.9]  Severe sepsis (HCC) [A41.9, R65.20]                   Date / Time: 1/1/2024 11:53 AM    Patient Admission Status: Inpatient   Readmission Risk (Low < 19, Mod (19-27), High > 27): Readmission Risk Score: 23.8    Current PCP: No primary care provider on file.  PCP verified by CM?      Chart Reviewed: Yes      History Provided by: (P) Medical Record, Child/Family  Patient Orientation: (P) Person    Patient Cognition: (P) Alert    Hospitalization in the last 30 days (Readmission):  No    If yes, Readmission Assessment in  Navigator will be completed.    Advance Directives:      Code Status: Full Code   Patient's Primary Decision Maker is:      Primary Decision Maker: JeanaElodia Miguel A Niolivia/Nephew - [phone]    Secondary Decision Maker: DaniloEricka - Niece/Nephew - [phone]    Discharge Planning:    Patient lives with: (P) Alone Type of Home: (P) House  Primary Care Giver:    Patient Support Systems include: (P) Family Members   Current Financial resources:    Current community resources:    Current services prior to admission: (P) None            Current DME:              Type of Home Care services:  (P) PT, OT ([facility])    ADLS  Prior functional level: (P) Independent in ADLs/IADLs  Current functional level: (P) Assistance with the following:, Mobility    PT AM-PAC: 17 /24  OT AM-PAC: 15 /24    Family can provide assistance at DC: (P) No  Would you like Case Management to discuss the discharge plan with any other family members/significant others, and if so, who?    Plans to Return to Present Housing: (P) No (Pt

## 2024-01-04 NOTE — PROGRESS NOTES
In Patient Progress note      Admit Date: 1/1/2024      Impression:     #1 acute kidney injury on chronic kidney disease stage IIIb, baseline creatinine close to 1.8-2 range since 2021, etiology appears to be secondary to prerenal state versus tubular injury/acute tubular necrosis.  Prerenal state appears to be secondary to volume depletion in the setting of sepsis and poor intake. Subnephrotic proteinuria , urine electrolytes not done although ordered ,   CT abdomen was done without obstructive uropathy but does show right upper pole nonobstructing nephrolithiasis  #2 nongap hyperchloremic metabolic acidosis, suspect secondary to proximal tubular injury from chemotherapy, causing increased bicarb loss.  Needs either p.o. or IV bicarb to replete  #3 hypernatremia secondary to free water deficit  #4 sepsis secondary to pneumonia versus cystitis  #5 recent diagnosis of stage IV nonsmall cell lung cancer with malignant pleural effusion on chemotherapy  #6 cystitis  #7 neutropenia and thrombocytopenia  #8 altered mental status secondary to metabolic encephalopathy, unclear what his underlying baseline status is  #9 CKD3b with proteinuria  , baseline creat of ~ 1.80-2 range since 2021     Plan:  #1 strict intake output charting  #2 continue gentle hypotonic bicarb drip  #3 continue p.o. bicarb  #4 hypotonic fluids for hypernatremia  as above -> improving  #5 encourage p.o. intake  #6 monitor electrolytes renal functions daily     Discussed plan with primary team,     Please call with questions     Srevando Patel MD [facility]  Cell [phone]  Pager: [phone]  Fax   [phone]   Subjective:     - No acute over night events.  - respiratory - stable  - hemodynamics - stable, no pressrs  - UOP-  - Nutrition -    Objective:     BP (!) 101/53   Pulse 85   Temp 97.5 °F (36.4 °C) (Axillary)   Resp 16   Ht 1.778 m (5' 10\")   Wt 62 kg (136 lb 11 oz)   SpO2 96%   BMI 19.61 kg/m²       Intake/Output Summary (Last 24

## 2024-01-04 NOTE — CONSENT
Informed Consent for Blood Component Transfusion Note    I have discussed with the adult caretaker the rationale for blood component transfusion; its benefits in treating or preventing fatigue, organ damage, or death; and its risk which includes mild transfusion reactions, rare risk of blood borne infection, or more serious but rare reactions. I have discussed the alternatives to transfusion, including the risk and consequences of not receiving transfusion. The adult caretaker had an opportunity to ask questions and had agreed to proceed with transfusion of blood components.    Electronically signed by THOMAS HAMMER MD on 1/4/24 at 8:18 AM EST

## 2024-01-04 NOTE — PROGRESS NOTES
Hospitalist Progress Note    NAME:  Chad Sorto   :   1950   MRN:   [id number]     Date/Time:  2024  Subjective: pt is very complicated with multiple issues;   Chief Complaint:      Pt remain confused; with lower BP and high HR in afib;      Review of Systems:  Y  N       Y  N  []   []    Fever/chills                                               []   []    Chest Pain  []   []    Cough                                                       []   []    Diarrhea   []   []    Sputum                                                     []   []    Constipation  []   []    SOB/MORALES                                                []   []    Nausea/Vomit  []   []    Abd Pain                                                    []   []    Tolerating PT  []   []    Dysuria                                                      []   []    Tolerating Diet     [x]  Unable to obtain  ROS due to  [x]  mental status change  []  sedated   []  intubated     Past Med History and Social history reviewed. No changes.   [x]  Current Medication list and allergies reviewed*    Objective:   Vitals:  BP (!) 98/56   Pulse (!) 135   Temp 97.9 °F (36.6 °C) (Axillary)   Resp 22   Ht 1.778 m (5' 10\")   Wt 62 kg (136 lb 11 oz)   SpO2 97%   BMI 19.61 kg/m²   Temp (24hrs), Av.7 °F (36.5 °C), Min:97.4 °F (36.3 °C), Max:97.9 °F (36.6 °C)      Last 24hr Input/Output:    Intake/Output Summary (Last 24 hours) at 2024 1420  Last data filed at 2024 1346  Gross per 24 hour   Intake 0 ml   Output --   Net 0 ml        PHYSICAL EXAM:  Gen:  [x]  WD []  WN  []  cachectic  []  thin  []  obese  [x]  disheveled             []   Critically ill or  []  Chronically ill appearing    HEENT:   []   red/pink conjunctivae [x]  pale conjunctivae                  PERRL  []  yes  []  no        hearing intact to voice []  yes  []  No               []  moist or  [x]  dry  Mucosa  NECK:   supple [x]  yes  []  no      masses []  yes  []  No

## 2024-01-04 NOTE — PROGRESS NOTES
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 *    Stage IV NSC lung ca,  right malignant pleural effusion,adenoca new dx  Carboplatin, pemetreaxed, pembrolizumab cycle 1 ,23  Pancytopenia post chemo  Poor intake, dehydration, hypotension  Failure to thrive  Confusion  H/o VTE 2023  High risk of aspiration  cystitis    Plan:   Appreciate ID and renal input  IVF, acc I/O, bicarb  Neupogen support  Neutropenia precautions  1 unit prbc today, verbal  consent from Elodia ABDI  Hold anticoagulation  IV antibiotics  Lives by himself, niece Elodia ABDI in [place], out pt attempts made by us with SW intervention regarding supervision and care at home  Called Elodia , discussed condition  Palliative care consult, completion of AMD    Nahomi Fowler MD  [facility]  Office [phone]      Subjective:     Remains confused      Objective:     BP (!) 101/53   Pulse 85   Temp 97.5 °F (36.4 °C) (Axillary)   Resp 16   Ht 1.778 m (5' 10\")   Wt 62 kg (136 lb 11 oz)   SpO2 96%   BMI 19.61 kg/m²           Temp (24hrs), Av.5 °F (36.4 °C), Min:97.3 °F (36.3 °C), Max:97.9 °F (36.6 °C)      No intake or output data in the 24 hours ending 24 0820  Review of Systems:   Constitutional: negative for fevers, chills,

## 2024-01-04 NOTE — PLAN OF CARE
Problem: Safety - Adult  Goal: Free from fall injury  Outcome: Progressing     Problem: Pain  Goal: Verbalizes/displays adequate comfort level or baseline comfort level  Outcome: Progressing     Problem: Skin/Tissue Integrity  Goal: Absence of new skin breakdown  Description: 1.  Monitor for areas of redness and/or skin breakdown  2.  Assess vascular access sites hourly  3.  Every 4-6 hours minimum:  Change oxygen saturation probe site  4.  Every 4-6 hours:  If on nasal continuous positive airway pressure, respiratory therapy assess nares and determine need for appliance change or resting period.  Outcome: Progressing     Problem: Occupational Therapy - Adult  Goal: By Discharge: Performs self-care activities at highest level of function for planned discharge setting.  See evaluation for individualized goals.  Description: Occupational Therapy Goals:  Initiated 1/3/2024 to be met within 7-10 days.    1.  Patient will perform grooming with supervision/set-up while standing with Good balance.   2.  Patient will perform self-feeding with supervision/set-up using AE prn.  3.  Patient will perform lower body dressing with supervision/set-up.  4.  Patient will perform toilet transfers with supervision/set-up.  5.  Patient will perform all aspects of toileting with supervision/set-up.  6.  Patient will participate in upper extremity therapeutic exercise/activities with supervision/set-up for 8 minutes to improve endurance and UB strength needed for ADLs    7.  Patient will utilize energy conservation techniques during functional activities with verbal cues.    PLOF: Pt lives alone, independent.    1/3/2024 1206 by Porsha Pete, OTR/L  Outcome: Progressing     Problem: Physical Therapy - Adult  Goal: By Discharge: Performs mobility at highest level of function for planned discharge setting.  See evaluation for individualized goals.  Description: Physical Therapy Goals  Initiated 1/3/2024 and to be accomplished

## 2024-01-04 NOTE — PROGRESS NOTES
Infectious Disease progress Note        Reason: sepsis     Current abx Prior abx   Cefepime since 1/2  Vancomycin since 1/1 Pip/tazo 1/1     Lines:       Assessment :  74y/o Male with a PMHx of stage IV non-small cell lung cancer, malignant pleural effusion, newly diagnosed adenocarcinoma on chemotherapy-last treatment 12/19/2023, Pulmonary hypertension, CKD, Gout, and recent Acute PE along with PTX who presented to [facility] ED on 1/1/24 due to altered mental status.    Clinical presentation consistent with sepsis-present on admission due to left lower lobe pneumonia superimposed on underlying lung malignancy, probable cystitis  Exact microbial etiology of left lower lung pneumonia not entirely clear- ?  Aspiration pneumonia versus community-acquired pneumonia    Probable cystitis 4+ bacteria noted on UA 1/1,hematuria concerning for cystitis.  Patient may not have significant pyuria due to concurrent neutropenic state    Neutropenia/thrombocytopenia-likely secondary to recent chemotherapy    Acute on chronic kidney injury-likely due to volume depletion    Altered mentation-likely metabolic encephalopathy.  Cefepime can contribute to this too    Recommendations:    Continue cefepime   d/c vancomycin   Follow up urine culture   Follow-up oncology recommendations regarding further chemotherapy, goals of care, neutropenia  Follow-up nephrology recommendations  Monitor CBC, temperature, procalcitonin, clinically      Above plan was discussed in details with primary team. Please call me if any further questions or concerns. Will continue to participate in the care of this patient.  HPI:        Currently patient denies any chest pain, shortness of breath, abdominal pain.  He is unaware that he is in the hospital.  Reliability of review of system is questionable      Past Medical History:   Diagnosis Date    CKD (chronic kidney disease) stage 4, GFR 15-29 ml/min ([facility]) 11/22/2023    Gout     HTN (hypertension)     Primary  IntraVENous PRN    heparin (porcine) injection 2,480 Units  40 Units/kg IntraVENous PRN    [Held by provider] heparin 25,000 units in dextrose 5% 250 mL (premix) infusion  5-30 Units/kg/hr IntraVENous Continuous    famotidine (PEPCID) 20 mg in sodium chloride (PF) 0.9 % 10 mL injection  20 mg IntraVENous Daily       Allergies: Patient has no known allergies.    No family history on file.  Social History     Socioeconomic History    Marital status: Single     Spouse name: Not on file    Number of children: Not on file    Years of education: Not on file    Highest education level: Not on file   Occupational History    Not on file   Tobacco Use    Smoking status: Never    Smokeless tobacco: Never   Vaping Use    Vaping Use: Never used   Substance and Sexual Activity    Alcohol use: Not Currently    Drug use: Never    Sexual activity: Not Currently   Other Topics Concern    Not on file   Social History Narrative    Not on file     Social Determinants of Health     Financial Resource Strain: Not on file   Food Insecurity: Not on file (2023)   Transportation Needs: No Transportation Needs (2023)    Transportation Problems ([facility] HRS)     In the past 12 months, has lack of reliable transportation kept you from medical appointments, meetings, work or from getting things needed for daily living?: Not on file   Physical Activity: Not on file   Stress: Not on file   Social Connections: Not on file   Intimate Partner Violence: Not on file   Housing Stability: Low Risk  (2023)    Housing Stability Vital Sign     Unable to Pay for Housing in the Last Year: No     Number of Places Lived in the Last Year: 1     Unstable Housing in the Last Year: No     Social History     Tobacco Use   Smoking Status Never   Smokeless Tobacco Never        Temp (24hrs), Av.5 °F (36.4 °C), Min:97.3 °F (36.3 °C), Max:97.9 °F (36.6 °C)    BP (!) 101/53   Pulse 85   Temp 97.5 °F (36.4 °C) (Axillary)   Resp 16   Ht 1.778 m (5'

## 2024-01-05 LAB
ABO + RH BLD: NORMAL
ALBUMIN SERPL-MCNC: 2 G/DL (ref 3.4–5)
ANION GAP SERPL CALC-SCNC: 5 MMOL/L (ref 3–18)
BASOPHILS # BLD: 0 K/UL (ref 0–0.1)
BASOPHILS NFR BLD: 0 % (ref 0–2)
BLD PROD TYP BPU: NORMAL
BLD PROD TYP BPU: NORMAL
BLOOD BANK CMNT PATIENT-IMP: NORMAL
BLOOD BANK DISPENSE STATUS: NORMAL
BLOOD BANK DISPENSE STATUS: NORMAL
BLOOD GROUP ANTIBODIES SERPL: NORMAL
BPU ID: NORMAL
BPU ID: NORMAL
BUN SERPL-MCNC: 65 MG/DL (ref 7–18)
BUN/CREAT SERPL: 27 (ref 12–20)
CALCIUM SERPL-MCNC: 8.3 MG/DL (ref 8.5–10.1)
CALLED TO: NORMAL
CHLORIDE SERPL-SCNC: 119 MMOL/L (ref 100–111)
CO2 SERPL-SCNC: 25 MMOL/L (ref 21–32)
CREAT SERPL-MCNC: 2.37 MG/DL (ref 0.6–1.3)
CROSSMATCH RESULT: NORMAL
CROSSMATCH RESULT: NORMAL
DIFFERENTIAL METHOD BLD: ABNORMAL
EOSINOPHIL # BLD: 0 K/UL (ref 0–0.4)
EOSINOPHIL NFR BLD: 0 % (ref 0–5)
ERYTHROCYTE [DISTWIDTH] IN BLOOD BY AUTOMATED COUNT: 14.6 % (ref 11.6–14.5)
ERYTHROCYTE [DISTWIDTH] IN BLOOD BY AUTOMATED COUNT: 14.6 % (ref 11.6–14.5)
GLUCOSE BLD STRIP.AUTO-MCNC: 119 MG/DL (ref 70–110)
GLUCOSE BLD STRIP.AUTO-MCNC: 139 MG/DL (ref 70–110)
GLUCOSE BLD STRIP.AUTO-MCNC: 140 MG/DL (ref 70–110)
GLUCOSE BLD STRIP.AUTO-MCNC: 95 MG/DL (ref 70–110)
GLUCOSE SERPL-MCNC: 133 MG/DL (ref 74–99)
HCT VFR BLD AUTO: 25.2 % (ref 36–48)
HCT VFR BLD AUTO: 28 % (ref 36–48)
HGB BLD-MCNC: 8.6 G/DL (ref 13–16)
HGB BLD-MCNC: 9.6 G/DL (ref 13–16)
IMM GRANULOCYTES # BLD AUTO: 0 K/UL
IMM GRANULOCYTES NFR BLD AUTO: 0 %
LYMPHOCYTES # BLD: 0.4 K/UL (ref 0.9–3.6)
LYMPHOCYTES NFR BLD: 44 % (ref 21–52)
MAGNESIUM SERPL-MCNC: 2.1 MG/DL (ref 1.6–2.6)
MCH RBC QN AUTO: 27.7 PG (ref 24–34)
MCH RBC QN AUTO: 28.2 PG (ref 24–34)
MCHC RBC AUTO-ENTMCNC: 34.1 G/DL (ref 31–37)
MCHC RBC AUTO-ENTMCNC: 34.3 G/DL (ref 31–37)
MCV RBC AUTO: 81 FL (ref 78–100)
MCV RBC AUTO: 82.1 FL (ref 78–100)
MONOCYTES # BLD: 0 K/UL (ref 0.05–1.2)
MONOCYTES NFR BLD: 0 % (ref 3–10)
NEUTS BAND NFR BLD MANUAL: 6 % (ref 0–5)
NEUTS SEG # BLD: 0.4 K/UL (ref 1.8–8)
NEUTS SEG NFR BLD: 50 % (ref 40–73)
NRBC # BLD: 0 K/UL (ref 0–0.01)
NRBC # BLD: 0 K/UL (ref 0–0.01)
NRBC BLD-RTO: 0 PER 100 WBC
NRBC BLD-RTO: 0 PER 100 WBC
PHOSPHATE SERPL-MCNC: 1.9 MG/DL (ref 2.5–4.9)
PLATELET # BLD AUTO: 23 K/UL (ref 135–420)
PLATELET # BLD AUTO: 7 K/UL (ref 135–420)
PLATELET COMMENT: ABNORMAL
PMV BLD AUTO: 11 FL (ref 9.2–11.8)
POTASSIUM SERPL-SCNC: 3.5 MMOL/L (ref 3.5–5.5)
PROCALCITONIN SERPL-MCNC: 1.82 NG/ML
RBC # BLD AUTO: 3.11 M/UL (ref 4.35–5.65)
RBC # BLD AUTO: 3.41 M/UL (ref 4.35–5.65)
RBC MORPH BLD: ABNORMAL
RBC MORPH BLD: ABNORMAL
SODIUM SERPL-SCNC: 149 MMOL/L (ref 136–145)
SPECIMEN EXP DATE BLD: NORMAL
TOTAL CELLS COUNTED SPEC: 50
UNIT DIVISION: 0
UNIT DIVISION: 0
WBC # BLD AUTO: 0.8 K/UL (ref 4.6–13.2)
WBC # BLD AUTO: 0.8 K/UL (ref 4.6–13.2)

## 2024-01-05 PROCEDURE — 99232 SBSQ HOSP IP/OBS MODERATE 35: CPT | Performed by: INTERNAL MEDICINE

## 2024-01-05 PROCEDURE — 2580000003 HC RX 258

## 2024-01-05 PROCEDURE — 94761 N-INVAS EAR/PLS OXIMETRY MLT: CPT

## 2024-01-05 PROCEDURE — 2580000003 HC RX 258: Performed by: INTERNAL MEDICINE

## 2024-01-05 PROCEDURE — 36430 TRANSFUSION BLD/BLD COMPNT: CPT

## 2024-01-05 PROCEDURE — 2500000003 HC RX 250 WO HCPCS: Performed by: PHYSICIAN ASSISTANT

## 2024-01-05 PROCEDURE — 6360000002 HC RX W HCPCS: Performed by: INTERNAL MEDICINE

## 2024-01-05 PROCEDURE — 97110 THERAPEUTIC EXERCISES: CPT

## 2024-01-05 PROCEDURE — 2500000003 HC RX 250 WO HCPCS: Performed by: INTERNAL MEDICINE

## 2024-01-05 PROCEDURE — 99233 SBSQ HOSP IP/OBS HIGH 50: CPT | Performed by: NURSE PRACTITIONER

## 2024-01-05 PROCEDURE — 83735 ASSAY OF MAGNESIUM: CPT

## 2024-01-05 PROCEDURE — 2580000003 HC RX 258: Performed by: PHYSICIAN ASSISTANT

## 2024-01-05 PROCEDURE — 82962 GLUCOSE BLOOD TEST: CPT

## 2024-01-05 PROCEDURE — 97535 SELF CARE MNGMENT TRAINING: CPT

## 2024-01-05 PROCEDURE — 85025 COMPLETE CBC W/AUTO DIFF WBC: CPT

## 2024-01-05 PROCEDURE — 99233 SBSQ HOSP IP/OBS HIGH 50: CPT | Performed by: INTERNAL MEDICINE

## 2024-01-05 PROCEDURE — 36415 COLL VENOUS BLD VENIPUNCTURE: CPT

## 2024-01-05 PROCEDURE — 2700000000 HC OXYGEN THERAPY PER DAY

## 2024-01-05 PROCEDURE — P9073 PLATELETS PHERESIS PATH REDU: HCPCS

## 2024-01-05 PROCEDURE — 1100000000 HC RM PRIVATE

## 2024-01-05 PROCEDURE — 80069 RENAL FUNCTION PANEL: CPT

## 2024-01-05 PROCEDURE — 92526 ORAL FUNCTION THERAPY: CPT

## 2024-01-05 PROCEDURE — 84145 PROCALCITONIN (PCT): CPT

## 2024-01-05 PROCEDURE — 85027 COMPLETE CBC AUTOMATED: CPT

## 2024-01-05 RX ORDER — DEXTROSE AND SODIUM CHLORIDE 5; .45 G/100ML; G/100ML
INJECTION, SOLUTION INTRAVENOUS CONTINUOUS
Status: DISCONTINUED | OUTPATIENT
Start: 2024-01-05 | End: 2024-01-06

## 2024-01-05 RX ORDER — LORAZEPAM 2 MG/ML
1 INJECTION INTRAMUSCULAR ONCE
Status: DISCONTINUED | OUTPATIENT
Start: 2024-01-05 | End: 2024-01-05

## 2024-01-05 RX ORDER — SODIUM CHLORIDE 9 MG/ML
INJECTION, SOLUTION INTRAVENOUS PRN
Status: DISCONTINUED | OUTPATIENT
Start: 2024-01-05 | End: 2024-01-05

## 2024-01-05 RX ORDER — DEXTROSE, SODIUM CHLORIDE, SODIUM LACTATE, POTASSIUM CHLORIDE, AND CALCIUM CHLORIDE 5; .6; .31; .03; .02 G/100ML; G/100ML; G/100ML; G/100ML; G/100ML
INJECTION, SOLUTION INTRAVENOUS CONTINUOUS
Status: DISCONTINUED | OUTPATIENT
Start: 2024-01-05 | End: 2024-01-05

## 2024-01-05 RX ADMIN — SODIUM CHLORIDE, PRESERVATIVE FREE 10 ML: 5 INJECTION INTRAVENOUS at 01:46

## 2024-01-05 RX ADMIN — SODIUM CHLORIDE, PRESERVATIVE FREE 20 MG: 5 INJECTION INTRAVENOUS at 09:26

## 2024-01-05 RX ADMIN — DEXTROSE AND SODIUM CHLORIDE: 5; 450 INJECTION, SOLUTION INTRAVENOUS at 14:12

## 2024-01-05 RX ADMIN — AMIODARONE HYDROCHLORIDE 0.5 MG/MIN: 1.8 INJECTION, SOLUTION INTRAVENOUS at 15:30

## 2024-01-05 RX ADMIN — SODIUM CHLORIDE, PRESERVATIVE FREE 10 ML: 5 INJECTION INTRAVENOUS at 09:20

## 2024-01-05 RX ADMIN — AMIODARONE HYDROCHLORIDE 0.5 MG/MIN: 1.8 INJECTION, SOLUTION INTRAVENOUS at 03:57

## 2024-01-05 RX ADMIN — SODIUM BICARBONATE: 84 INJECTION, SOLUTION INTRAVENOUS at 01:46

## 2024-01-05 RX ADMIN — SODIUM CHLORIDE, SODIUM LACTATE, POTASSIUM CHLORIDE, CALCIUM CHLORIDE AND DEXTROSE MONOHYDRATE: 5; 600; 310; 30; 20 INJECTION, SOLUTION INTRAVENOUS at 09:38

## 2024-01-05 RX ADMIN — CEFEPIME 1000 MG: 1 INJECTION, POWDER, FOR SOLUTION INTRAMUSCULAR; INTRAVENOUS at 01:05

## 2024-01-05 RX ADMIN — PIPERACILLIN AND TAZOBACTAM 4500 MG: 4; .5 INJECTION, POWDER, FOR SOLUTION INTRAVENOUS at 09:26

## 2024-01-05 RX ADMIN — METRONIDAZOLE 500 MG: 500 INJECTION, SOLUTION INTRAVENOUS at 04:02

## 2024-01-05 RX ADMIN — FILGRASTIM-AAFI 300 MCG: 300 INJECTION, SOLUTION SUBCUTANEOUS at 12:15

## 2024-01-05 RX ADMIN — SODIUM CHLORIDE, PRESERVATIVE FREE 10 ML: 5 INJECTION INTRAVENOUS at 22:01

## 2024-01-05 RX ADMIN — PIPERACILLIN AND TAZOBACTAM 3375 MG: 3; .375 INJECTION, POWDER, FOR SOLUTION INTRAVENOUS at 14:13

## 2024-01-05 ASSESSMENT — PAIN SCALES - GENERAL
PAINLEVEL_OUTOF10: 0
PAINLEVEL_OUTOF10: 4
PAINLEVEL_OUTOF10: 0

## 2024-01-05 ASSESSMENT — PAIN SCALES - PAIN ASSESSMENT IN ADVANCED DEMENTIA (PAINAD)
BODYLANGUAGE: 0
CONSOLABILITY: 0
BREATHING: 0
NEGVOCALIZATION: 0
FACIALEXPRESSION: 1
NEGVOCALIZATION: 0
FACIALEXPRESSION: 0
TOTALSCORE: 0
CONSOLABILITY: 1
BODYLANGUAGE: 1
BREATHING: 0
TOTALSCORE: 3

## 2024-01-05 NOTE — SIGNIFICANT EVENT
RN notified of significant agitation by patient on arrival to CV-SDU. Agitation liklely contributing to tachycardia as subsides intermittently when patient not attempting to remove his mits.    Preferred use of non-benzodiazipines however limited given NPO status and unable to use oral seroquel or zyprexa. Prefer also to avoid IM injection at risk of further agitation.    Will trial 1mg IV ativan x1 now given severe agitation.    David Roblero MD  4:39 AM    Ativan was not given after nurse was able to calm patient with further bedside support.    David Roblero MD  7:07 AM

## 2024-01-05 NOTE — PROGRESS NOTES
0211: TRANSFER - IN REPORT:    Verbal report received from SHONA Rodriguez on Chad Sorto being received from [facility] for routine progression of care.     Report consisted of patient’s Situation, Background, Assessment and Recommendations(SBAR).     Information from the following report(s) SBAR, Kardex, Intake/Output, MAR, Recent Results, and Cardiac Rhythm A-fib  was reviewed. Opportunity for questions and clarification was provided.      Per offgoing RN; Pt still needs platelet transfusion.     Assessment completed upon patient’s arrival to unit and care assumed.     0325: Patient received to room 2313. Patient connected to monitor,V/S obtained and assessment completed. Pt Alert and oriented to self only. Non verbal;flat affect; nods appropriately; able to follow commands for the most part. No distress noted at this time. SpO2: 100% on 2LNC. Pt arrived with amio gtt; rate verified, IVF rate verified. Pt also has bilateral soft mitts restraints; orders verified. No injuries noted. Call light within reach.     CHG bath done; incontinence care done (stool & urine) ; Stool noted medium black loose. Condom catheter provided. Oral care done; oral suction done with Radhauer; noted white clear, thick sputum.    Admission skin assessment completed with SHONA Malloy and reveals the following: Skin intact, no pressure injuries or wounds noted.     Called bloodbank to check if platelet is available; no answer; call attempted 3x, no answer.     0420: Pt starts getting agitated; attempting to remove bilateral soft mits restraints, blanket and gown. Heart rate going up to 150's. Informed pt that he needs to keep his mitts so he won't pull his IV's; pt states \"okay\" but still keeps on removing mitts.  MD paged; waiting for call back.     0435: MD called back; informed him regarding pt's agitation and Heart rate. Order for IV ativan received.     0515: Ordered Ativan not given after talking to pt and reassuring him. Explained to him

## 2024-01-05 NOTE — PROGRESS NOTES
Patient recently had RRT called do to new GI bleed. Contacted family ( Elodia) to give update on patient status. Explained current situation with updated on RBC  and Platelet transfusion.  Family member stated that she tried to call palliative care back but will try to tomorrow.

## 2024-01-05 NOTE — PLAN OF CARE
PHYSICAL THERAPY TREATMENT    Patient: Chad Sorto (73 y.o. male)  Date: 1/5/2024  Diagnosis: Paroxysmal A-fib (HCC) [I48.0]  Sepsis (HCC) [A41.9]  Severe sepsis (HCC) [A41.9, R65.20] Severe sepsis (HCC)      Precautions: Fall Risk, Aspiration Risk, Bed Alarm    ASSESSMENT:  Patient found lying supine in bed, in NAD, pleasant and agreeable to skilled therapy services; Nursing cleared.. Patient guided in lower extremity strengthening routine to promote circulation, improve strength and range for improved levels of functional mobility; to include bed mobility, transfers and gait. Patient performed the following exercises without any adverse response; AP, SLRs, SAQs, hip abd/add slides and hip internal and external rotation routine x15 each LE-- min to mod A provided throughout due to noted weakness and inability to initiate without physical assistance. Patient deferred sitting EOB/performing OOB activity. Patient remained in bed at conclusion of session with all needs met, niece nearby and call bell within reach; Informed to utilize call bell to gain any assistance to reduce risk for falls; expressed understanding.     Progression toward goals:   []      Improving appropriately and progressing toward goals  [x]      Improving slowly and progressing toward goals  []      Not making progress toward goals and plan of care will be adjusted     PLAN:  Patient continues to benefit from skilled intervention to address the above impairments.  Continue treatment per established plan of care.    Further Equipment Recommendations for Discharge: walker, wheel chair    [facility]: AM-PAC Inpatient Mobility Raw Score : 13      Current research shows that an AM-PAC score of 17 (13 without stairs) or less is not associated with a discharge to the patient's home setting. Based on an AM-PAC score and their current functional mobility deficits, it is recommended that the patient have 5-7 sessions per week of Physical Therapy at d/c to

## 2024-01-05 NOTE — PLAN OF CARE
Problem: Occupational Therapy - Adult  Goal: By Discharge: Performs self-care activities at highest level of function for planned discharge setting.  See evaluation for individualized goals.  Description: Occupational Therapy Goals:  Initiated 1/3/2024 to be met within 7-10 days.    1.  Patient will perform grooming with supervision/set-up while standing with Good balance.   2.  Patient will perform self-feeding with supervision/set-up using AE prn.  3.  Patient will perform lower body dressing with supervision/set-up.  4.  Patient will perform toilet transfers with supervision/set-up.  5.  Patient will perform all aspects of toileting with supervision/set-up.  6.  Patient will participate in upper extremity therapeutic exercise/activities with supervision/set-up for 8 minutes to improve endurance and UB strength needed for ADLs    7.  Patient will utilize energy conservation techniques during functional activities with verbal cues.    PLOF: Pt lives alone, independent.    Outcome:   Not Progressing   OCCUPATIONAL THERAPY TREATMENT    Patient: Chad Sorto (73 y.o. male)  Date: 1/5/2024  Diagnosis: Paroxysmal A-fib (HCC) [I48.0]  Sepsis (HCC) [A41.9]  Severe sepsis (HCC) [A41.9, R65.20] Severe sepsis (HCC)      Precautions: Fall Risk, Aspiration Risk, Bed Alarm    Chart, occupational therapy assessment, plan of care, and goals were reviewed.  ASSESSMENT:  NSG in w/pt performing incontinent care. NSGCarl, reports pt waxing/waning. Disoriented x 4 and nonverbal at this time. Opens eyes intermittently to verbal stimulus.  Attempted UE dressing ADL, donning hospital gown, however, no command following w/BUE to thread BUE into openings. Pt requires hand over hand and TOTAL A donning hospital gown. Performed PROM Therex and positioned pt for comfort at end of session.     Progression toward goals:  []          Improving appropriately and progressing toward goals  []          Improving slowly and progressing toward  Outcome: Unable to demonstrate understanding    Thank you for this referral.  MARLO Beck  Minutes: 10

## 2024-01-05 NOTE — PROGRESS NOTES
Physician Progress Note      PATIENT:               ESTEBAN ARGUELLES  CSN #:                  [id number]  :                       1950  ADMIT DATE:       2024 11:53 AM  DISCH DATE:  RESPONDING  PROVIDER #:        Jayson Bhagat MD          QUERY TEXT:    Pt admitted with sepsis and has malnutrition documented in 1/3 RD progress   note. Please further specify type of malnutrition with documentation in the   medical record.    The medical record reflects the following:  Risk Factors: Past medical hx: CKD stage 4, gout, HTN, newly diagnosed stage   IV adenocarcinoma of lung.  Clinical Indicators: Per 1/3 RD: Malnutrition Status:  Severe malnutrition   (24 1453)  Context:  Acute Illness  Findings of the 6 clinical characteristics of malnutrition:  Energy Intake:  No significant decrease in energy intake  Weight Loss:  Greater than 5% over 1 month  Body Fat Loss:  Moderate body fat loss Triceps, Fat Overlying Ribs  Muscle Mass Loss:  Moderate muscle mass loss  Treatment: RD following    ASPEN Criteria:    https://aspenjournals.onlinelibrary.wynn.com/doi/full/10.1177/028901367580903  5    Thank you,  Amairani Clark RN, CDI  [email or web address]  >  Options provided:  -- Severe Malnutrition  -- Other - I will add my own diagnosis  -- Disagree - Not applicable / Not valid  -- Disagree - Clinically unable to determine / Unknown  -- Refer to Clinical Documentation Reviewer    PROVIDER RESPONSE TEXT:    This patient has severe malnutrition.    Query created by: Amairani Clark on 2024 2:52 PM      Electronically signed by:  Jayson Bhagat MD 2024 3:12 PM

## 2024-01-05 NOTE — PROGRESS NOTES
Hospitalist Progress Note    NAME:  Chad Sorto   :   1950   MRN:   [id number]     Date/Time:  2024  Subjective: pt is very complicated with multiple issues;   Chief Complaint:      Pt was transferred to the stepdown bed because of hypotension A-fib with RVR not getting controlled medications.  Patient remained confused A-fib is still elevated although blood pressure is better now required 2 units of blood and platelets.  MRT was called because of rectal bleeding CT did show colitis before.  With low platelets.  After giving platelets bleeding has resolved.        Review of Systems:  Y  N       Y  N  []   []    Fever/chills                                               []   []    Chest Pain  []   []    Cough                                                       []   []    Diarrhea   []   []    Sputum                                                     []   []    Constipation  []   []    SOB/MORALES                                                []   []    Nausea/Vomit  []   []    Abd Pain                                                    []   []    Tolerating PT  []   []    Dysuria                                                      []   []    Tolerating Diet     [x]  Unable to obtain  ROS due to  [x]  mental status change  []  sedated   []  intubated     Past Med History and Social history reviewed. No changes.   [x]  Current Medication list and allergies reviewed*    Objective:   Vitals:  /72   Pulse 85   Temp 97.3 °F (36.3 °C) (Tympanic)   Resp 20   Ht 1.778 m (5' 10\")   Wt 62 kg (136 lb 11 oz)   SpO2 100%   BMI 19.61 kg/m²   Temp (24hrs), Av.7 °F (36.5 °C), Min:97.3 °F (36.3 °C), Max:98 °F (36.7 °C)      Last 24hr Input/Output:    Intake/Output Summary (Last 24 hours) at 2024 1338  Last data filed at 2024 1204  Gross per 24 hour   Intake 3758.5 ml   Output 250 ml   Net 3508.5 ml        PHYSICAL EXAM:  Gen:  [x]  WD []  WN  []  cachectic  []  thin  []  obese  [x]  disheveled    reviewed:  Current Facility-Administered Medications   Medication Dose Route Frequency    piperacillin-tazobactam (ZOSYN) 3,375 mg in sodium chloride 0.9 % 50 mL IVPB (mini-bag)  3,375 mg IntraVENous Q8H    dextrose 5 % in lactated ringers infusion   IntraVENous Continuous    0.9 % sodium chloride infusion   IntraVENous PRN    [Held by provider] phosphorus (K PHOS NEUTRAL) 1 tablet  250 mg Oral BID    midodrine (PROAMATINE) tablet 10 mg  10 mg Oral TID WC    0.9 % sodium chloride infusion   IntraVENous PRN    HYDROmorphone (DILAUDID) injection 0.5 mg  0.5 mg IntraVENous Q4H PRN    filgrastim-aafi (NIVESTYM) injection 300 mcg  300 mcg SubCUTAneous Daily    [Held by provider] sodium bicarbonate tablet 650 mg  650 mg Oral BID    [Held by provider] therapeutic multivitamin-minerals 1 tablet  1 tablet Oral Daily    [Held by provider] polyethylene glycol (GLYCOLAX) packet 17 g  17 g Oral Daily    sodium chloride flush 0.9 % injection 5-40 mL  5-40 mL IntraVENous 2 times per day    sodium chloride flush 0.9 % injection 5-40 mL  5-40 mL IntraVENous PRN    ondansetron (ZOFRAN-ODT) disintegrating tablet 4 mg  4 mg Oral Q8H PRN    Or    ondansetron (ZOFRAN) injection 4 mg  4 mg IntraVENous Q6H PRN    polyethylene glycol (GLYCOLAX) packet 17 g  17 g Oral Daily PRN    acetaminophen (TYLENOL) tablet 650 mg  650 mg Oral Q6H PRN    dextrose bolus 10% 250 mL  250 mL IntraVENous Once    glucose chewable tablet 16 g  4 tablet Oral PRN    dextrose bolus 10% 125 mL  125 mL IntraVENous PRN    Or    dextrose bolus 10% 250 mL  250 mL IntraVENous PRN    glucagon injection 1 mg  1 mg SubCUTAneous PRN    dextrose 10 % infusion   IntraVENous Continuous PRN    amiodarone (NEXTERONE) 360 mg in dextrose 5% 200 ml  0.5 mg/min IntraVENous Continuous    heparin (porcine) injection 4,960 Units  80 Units/kg IntraVENous PRN    heparin (porcine) injection 2,480 Units  40 Units/kg IntraVENous PRN    [Held by provider] heparin 25,000 units in dextrose 5%

## 2024-01-05 NOTE — CONSULTS
In Patient Progress note      Admit Date: 1/1/2024      Impression:     #1 acute kidney injury on chronic kidney disease stage IIIb, baseline creatinine close to 1.8-2 range since 2021, etiology appears to be secondary to prerenal state versus tubular injury/acute tubular necrosis. CT abdomen was done without obstructive uropathy but does show right upper pole nonobstructing nephrolithiasis  #2 nongap hyperchloremic metabolic acidosis, suspect secondary to proximal tubular injury from chemotherapy, causing increased bicarb loss.  Improved with bicarb  #3 hypernatremia secondary to free water deficit -> improving   #4 sepsis secondary to pneumonia versus cystitis  #5 recent diagnosis of stage IV nonsmall cell lung cancer with malignant pleural effusion on chemotherapy  #6 cystitis  #7 neutropenia and thrombocytopenia  #8 altered mental status secondary to metabolic encephalopathy, unclear what his underlying baseline status is  #9 CKD3b , baseline creat of ~ 1.80-2 range since 2021     Plan:  #1 strict intake output charting  #2 switch to D5 1/2 NS @ 75 cc/hrs   #3 midodrine for hypotension  #4 encourage p.o. intake  #5 PRN albumin/NS bolus for hypotension  #6 monitor electrolytes renal functions daily    Poor prognosis   Considering hospice, family discussing with palliative care   Plan to follow peripherally over the weekend     Discussed plan with Dr Bhagat     Discussed plan with primary team,     Please call with questions     Servando Patel MD FASN  Cell [phone]  Pager: [phone]  Fax   [phone]   Subjective:     - No acute over night events.  - respiratory - stable  - hemodynamics - stable, no pressrs  - UOP-ok  - Nutrition -ok    Objective:     /72   Pulse 85   Temp 97.3 °F (36.3 °C) (Tympanic)   Resp 20   Ht 1.778 m (5' 10\")   Wt 62 kg (136 lb 11 oz)   SpO2 100%   BMI 19.61 kg/m²       Intake/Output Summary (Last 24 hours) at 1/5/2024 1344  Last data filed at 1/5/2024 1204  Gross per

## 2024-01-05 NOTE — ACP (ADVANCE CARE PLANNING)
Advanced Steps Advance Care Planning Conversation  National POLST (Physician Orders for Life Sustaining Treatment)       Date of conversation: 1/5/2024    Location: [facility]     Palliative team members for visit this AM when no family was present at the bedside. Team made plan to meet with Elodia Sorto at bedside later in the afternoon.   Participants:   [x] Patient - non verbal and unable to engage with palliative team    [x] Healthcare agent (already designated in existing ACP document)    Name: Elodia Sorto     Relationship to Patient:deysi     Phone number: [phone]     Advanced Steps® ACP Facilitator: Caterina Ortiz, RN, Joellen Samuel, NP       Conversation Topics     Palliative medicine team spoke with Dr Fowler this AM. Dr Fowler was able to speak with CRISTIANAElodia and provided a medical update and  review of patient's overall state of health, poor prognosis, and that Mr. Sorto was not a candidate for aggressive cancer therapy.  Our team received a call from Ms Hills stating she would like to changed the Code status for Mr. Sorto to DNR/DNI. She added she would come to the hospital later today to complete the POLST form with our team. Order for kavon to CODe was was made to DNR/DNI.   Palliative team met with Ms. Jose Sorto at bedside this afternoon. POLST form was completed. CRISTIANA Clifton agreed to no further escalation of care including IV pressor support or ICU transfer        Cardiopulmonary Resuscitation        Order Elected for CPR:  []  YES CPR            [x]  NO CPR- NO escalation of care including IV pressor support or transfer to ICU level of care.       When NOT in Cardiopulmonary Arrest, Order Elected:      [] Comfort Focused Treatment   [x] Selective Treatments  [] Full Treatments     Artificially Administered Nutrition, Order Elected:    [x] No Artifical Means of Nutrition Desired  [] Trial Period for artifical nutrition but no surgically placed

## 2024-01-05 NOTE — PROGRESS NOTES
[facility] - Progress Note    Admit Date: 1/1/2024  Attending Cardiologist: Dr. Velásquez     Assessment:     -AMS. CT Head negative for acute findings.   -Severe sepsis in setting of PNA.  -Paroxysmal atrial flutter with RVR, physiologic in setting of above. Cardioversion x 2 in the ED, subsequently started on IV amio gtt. On Eliquis and coreg as outpatient.   -ZULMA on CKD with hyperkalemia.   -NSTEMI type 2 in setting of above, troponin not c/w primary ACS. No plans for further coronary evaluation.   -Chronic HFpEF, not in exacerbation.   Echo 11/24/2023 with LVEF 60-65% with normal wall motion, grade I diastolic dysfunction  Echo this admission, LVEF 55-60%  -Moderate pericardial effusion.   -Metastatic Right pleural effusion.   -Hx PE 11/2023.   -Stage IV adenocarcinoma lung, on chemo.   -HTN        Consult by Dr. Velásquez     Plan:     -Continue IV amio gtt at 0.5 mg for variable AF rates, currently NPO. Can increase amio gtt for sustained RVR.  Goal HR < 120 bpm in setting of sepsis.   -Heparin gtt for stroke prophylaxis and hx PE currently being held d/t pancytopenia.   -Ongoing discussions regarding goals of care.   -Noted he is not a candidate for aggressive cancer tx per Oncology.     Subjective:      Remains confused.    Objective:      Patient Vitals for the past 8 hrs:   Temp Pulse Resp BP SpO2   01/05/24 0820 97.6 °F (36.4 °C) 94 18 (!) 129/91 100 %   01/05/24 0626 97.7 °F (36.5 °C) 85 20 120/66 99 %   01/05/24 0618 97.7 °F (36.5 °C) 71 16 126/66 99 %   01/05/24 0549 97.7 °F (36.5 °C) 96 18 107/76 99 %   01/05/24 0532 97.6 °F (36.4 °C) 88 20 116/71 100 %   01/05/24 0330 98 °F (36.7 °C) (!) 111 18 127/77 100 %           Patient Vitals for the past 96 hrs:   Weight   01/04/24 0600 62 kg (136 lb 11 oz)   01/03/24 1604 61.7 kg (136 lb)   01/02/24 0922 61.7 kg (136 lb)   01/01/24 1206 62 kg (136 lb 11.2 oz)   01/01/24 1201 61.7 kg (136 lb)         TELE: ZARA               Current Facility-Administered  (porcine) injection 2,480 Units  40 Units/kg IntraVENous PRN    [Held by provider] heparin 25,000 units in dextrose 5% 250 mL (premix) infusion  5-30 Units/kg/hr IntraVENous Continuous    famotidine (PEPCID) 20 mg in sodium chloride (PF) 0.9 % 10 mL injection  20 mg IntraVENous Daily         Intake/Output Summary (Last 24 hours) at 1/5/2024 0911  Last data filed at 1/5/2024 0626  Gross per 24 hour   Intake 1212.5 ml   Output --   Net 1212.5 ml         Physical Exam:  General:  alert, appears stated age, cooperative, no distress, and confused  Neck:  no JVD  Lungs:  diminished BL   Heart:  irregularly irregular rhythm  Abdomen:  abdomen is soft without significant tenderness  Extremities:  extremities normal, atraumatic, no cyanosis or edema    Visit Vitals  BP (!) 129/91   Pulse 94   Temp 97.6 °F (36.4 °C) (Tympanic)   Resp 18   Ht 1.778 m (5' 10\")   Wt 62 kg (136 lb 11 oz)   SpO2 100%   BMI 19.61 kg/m²       Data Review:     Labs: Results:       Chemistry Recent Labs     01/03/24  0438 01/04/24  0344 01/04/24  1835 01/05/24  0505   * 147* 152* 149*   K 4.4 4.3 4.3 3.5   * 123* 122* 119*   CO2 14* 18* 21 25   * 97* 80* 65*   CREATININE 2.90* 2.68* 2.61* 2.37*   MG 2.7* 2.3  --  2.1   PHOS 3.1 2.4*  --  1.9*   GLOB  --   --  3.7  --         CBC w/Diff Recent Labs     01/04/24  0344 01/04/24  1835 01/05/24  0505   WBC 0.6* 0.7* 0.8*   RBC 2.39* 2.79* 3.41*   HGB 6.5* 8.1* 9.6*   HCT 19.3* 23.5* 28.0*   PLT 18* 11* 7*        Cardiac Enzymes Lab Results   Component Value Date/Time    TROPHS 97 01/01/2024 05:40 PM    TROPHS 79 01/01/2024 03:58 PM    TROPHS 105 01/01/2024 12:20 PM      Coagulation Recent Labs     01/04/24  0344 01/04/24  1835   INR  --  1.4*   APTT 49.2*  --          Lipid Panel No results found for: \"CHOL\", \"CHOLPOCT\", \"CHOLX\", \"CHLST\", \"CHOLV\", \"747235\", \"HDL\", \"HDLC\", \"LDL\", \"LDLC\", \"971453\", \"VLDLC\", \"VLDL\", \"TGLX\", \"TRIGL\"   BNP Lab Results   Component Value Date/Time    NTPROBNP

## 2024-01-05 NOTE — PLAN OF CARE
Problem: SLP Adult - Impaired Swallowing  Goal: By Discharge: Advance to least restrictive diet without signs or symptoms of aspiration for planned discharge setting.  See evaluation for individualized goals.  Description: Patient will:  1. Tolerate PO trials with 0 s/s overt distress in 4/5 trials  2. Utilize compensatory swallow strategies/maneuvers (decrease bite/sip, size/rate, alt. liq/sol) with min cues in 4/5 trials  3. Perform oral-motor/laryngeal exercises to increase oropharyngeal swallow function with min cues  4. Complete an objective swallow study (i.e., MBSS) to assess swallow integrity, r/o aspiration, and determine of safest LRD, min A    Recommend:   NPO with alternate means of nutrition/hydration vs comfort feeds   Strict aspiration precautions (HOB >30 degrees at all times, Oral care TID)    Outcome: Progressing   SPEECH LANGUAGE PATHOLOGY DYSPHAGIA TREATMENT    Patient: Chad Sorto (73 y.o. male)  Date: 1/5/2024  Diagnosis: Paroxysmal A-fib (HCC) [I48.0]  Sepsis (HCC) [A41.9]  Severe sepsis (HCC) [A41.9, R65.20] Severe sepsis (HCC)      Precautions: Standard, aspiration  PLOF: As per H&P      ASSESSMENT:  Pt was seen at bedside for follow up dysphagia management. Pt occasionally able to answer questions appropriately; however, continued to be nonverbal at inappropriate times. Xerostomia observed - oral care provided by SLP with resultant congested/wet cough. Suction yankauer utilized. Pt able to elicit weak/decreased volitional swallow to palpation with delayed congested/wet cough as well. He continues to appear to be at a very high risk of aspiration, malnutrition, and dehydration with PO. Recommend NPO with consideration of an alternate means of nutrition/hydration vs comfort feeds, aspiration precautions, and oral care with suction. Will continue to follow for further dysphagia management.     Progression toward goals:  []         Improving appropriately and progressing toward goals  [x]          Improving slowly and progressing toward goals  []         Not making progress toward goals and plan of care will be adjusted     PLAN:  Recommendations and Planned Interventions:       Patient continues to benefit from skilled intervention to address the above impairments. Continue treatment per established plan of care.    Frequency/Duration: Patient will be followed by speech-language pathology 1-2 times per day/3-5 days per week to address goals.    Discharge Recommendations: D/C Recommendations: Ongoing speech therapy is recommended during this hospitalization     SUBJECTIVE:   Patient stated “Hey.\"    OBJECTIVE:   Daily Assessment:  Baseline Assessment  Temperature Spikes Noted: No  Respiratory Status: Room air  O2 Device: None (Room air)  Communication Observation: Functional  Follows Directions: None  Current Diet : NPO  Current Liquid Diet : NPO  Dentition: Adequate  Patient Positioning: Upright in bed  Baseline Vocal Quality: Aphonic  Volitional Cough: Weak, Congested, Wet  Orientation: unable to verbalize  Dysphagia Treatment:  Oral Assessment:  Oral Motor   Labial: Decreased rate, Decreased seal, Impaired coordination  Dentition: Natural, Intact  Oral Hygiene: Xerostomia  Lingual: Decreased rate, Decreased strength, Incoordinated  Velum: No Impairment  Mandible: No impairment        P.O. Trials:  PO Trials  Neuromuscular Estim Used: No  Assessment Method(s): Observation, Palpation  Patient Position: 60 at HOB  Vocal Quality: Aphonic  Aspiration Signs/Symptoms: Strong cough, Throat clear, Delayed cough/throat clear, Infiltrate on xray  Pharyngeal Phase Characteristics: Poor endurance, Altered vocal quality, Audible swallow, Suspected pharyngeal residue  Effective Modifications: None  Cues for Modifications: Maximal          DYSPHAGIA DIAGNOSIS: Dysphagia Diagnosis: Severe pharyngeal stage dysphagia, Severe oral stage dysphagia    PAIN:  Start of Tx: 0  End of Tx: 0     After treatment:   []

## 2024-01-05 NOTE — PROGRESS NOTES
Comprehensive Nutrition Assessment    Type and Reason for Visit:  Reassess    Nutrition Recommendations/Plan:   Continue NPO.   Pending goals of care, consult nutrition if tube feedings are appropriate and desired by pt/family.   Electrolyte replacements per nephrology.  Monitor readiness to advance diet vs initiate tube feedings, weight, labs, and POC while admitted.      Malnutrition Assessment:  Malnutrition Status:  Severe malnutrition (01/03/24 1453)    Context:  Acute Illness     Findings of the 6 clinical characteristics of malnutrition:  Energy Intake:  No significant decrease in energy intake  Weight Loss:  Greater than 5% over 1 month     Body Fat Loss:  Moderate body fat loss Triceps, Fat Overlying Ribs   Muscle Mass Loss:  Moderate muscle mass loss Clavicles (pectoralis & deltoids), Calf (gastrocnemius), Hand (interosseous)  Fluid Accumulation:  No significant fluid accumulation     Strength:  Not Performed    Nutrition Assessment:    Pt admitted with severe sepsis; has recent diagnosis of 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 Diet changed to NPO 1/4 per SLP- rec'd NPO with alternate means of nutrition/hydration vs comfort feeds. RRT called 1/4 2/2 melena. Palliative following, pt made Per chart, family to have further discussion with palliative care and complete AMD today. Pt made DNR today. Per cardiology, pt candidate for hospice. Will continue to follow for goals of care and if nutrition support is desired/appropriate per goals of care discussions.    Nutrition Related Findings:    Last BM: 1/5. Edema: none. Labs: Na 149 H, Cl 119 H, BUN/CR 65/2.37 H (trend down), GFR 28 L (trend down), phos 1.9 L. Ca 8.3 L, adjusted 9.9 WNL r/t albumin 2L. Pertinent meds: Pepcid, Zosyn, MVI-min (held r/t NPO), D5%-1/2 NS @ 75 mL/hr (provides 90g dextrose and 306 kcal). Wound Type: None       Current Nutrition Intake & Therapies:    Average Meal Intake: NPO  Average Supplements Intake: NPO  Diet

## 2024-01-05 NOTE — PROGRESS NOTES
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 *    Stage IV NSC lung ca,  right malignant pleural effusion,adenoca new dx  Carboplatin, pemetreaxed, pembrolizumab cycle 1 ,12/19/23  Pancytopenia post chemo  Poor intake, dehydration, hypotension  Paroxysmal at fib  Failure to thrive  Confusion  H/o VTE 11/2023  High risk of aspiration  cystitis    Plan:      1 unit plt given overnight  Neupogen support to continue until ANC above 1000  Supportive care, IV antibiotics ongoing  No anticoagulation  with severe thrombocytopenia  Discussed with TRINIDAD Clifton, updated her, reviewed overall state of health, poor prognosis, not a candidate for aggressive  cancer therapy  She will complete AMD today  D/w nursing, palliative care team    Dr Lepe to cover me next week.    Nahomi Fowler MD  [facility]  Office [phone]      Subjective:     Over night events noted, had melanotic BM  Remains confused      Objective:     BP (!) 129/91   Pulse 94   Temp 97.6 °F (36.4 °C) (Tympanic)   Resp 18   Ht 1.778 m (5' 10\")   Wt 62 kg (136 lb 11 oz)   SpO2 100%    Potassium 3.5 3.5 - 5.5 mmol/L    Chloride 119 (H) 100 - 111 mmol/L    CO2 25 21 - 32 mmol/L    Anion Gap 5 3.0 - 18 mmol/L    Glucose 133 (H) 74 - 99 mg/dL    BUN 65 (H) 7.0 - 18 MG/DL    Creatinine 2.37 (H) 0.6 - 1.3 MG/DL    Bun/Cre Ratio 27 (H) 12 - 20      Est, Glom Filt Rate 28 (L) >60 ml/min/1.73m2    Calcium 8.3 (L) 8.5 - 10.1 MG/DL    Phosphorus 1.9 (L) 2.5 - 4.9 MG/DL    Albumin 2.0 (L) 3.4 - 5.0 g/dL   Magnesium    Collection Time: 01/05/24  5:05 AM   Result Value Ref Range    Magnesium 2.1 1.6 - 2.6 mg/dL   Procalcitonin    Collection Time: 01/05/24  5:05 AM   Result Value Ref Range    Procalcitonin 1.82 ng/mL   POCT Glucose    Collection Time: 01/05/24  8:19 AM   Result Value Ref Range    POC Glucose 140 (H) 70 - 110 mg/dL

## 2024-01-05 NOTE — PROGRESS NOTES
Palliative Medicine  [facility]: [phone] ([phone]  [facility]: [phone]   [facility]: [phone]    Patient Name: Chad Sorto  YOB: 1950    Date of Initial Consult: 1/4/2024  Follow up 1/5/2024   Reason for Consult: goals of care   Requesting Provider: Dr Fowler    Primary Care Physician: No primary care provider on file.      SUMMARY:   Chad Sorto is a 73 y.o. year old with a past history of Stage IV NSCLC, pulmonary hypertension, CKD, gout, recent acute PE who was admitted on 1/1/2024 from home with a diagnosis of sepsis. Current medical issues leading to Palliative Medicine involvement include: 73-year-old male with stage IV non-small lung cancer who was found at home poorly responsive.  He was admitted to the hospital on 1/1/2024 initially to the ICU with sepsis was found to be in a flutter A-fib with RVR he was started on heparin drip for pulmonary embolus started on IV antibiotics.  Palliative medicine is consulted for goals of care discussions.    1/5/2024  a bit more alert this am, answered a few questions however did not engage in conversation. Appears weak ill.      PALLIATIVE DIAGNOSES:   Goals of care/advance care plan discussions  Sepsis  Stage IV lung cancer  ZULMA  Debility       PLAN:   Goals of care/advance care plan discussions   1/5/2024 follow up along with Ms Angel RN. Patient will alert answer a few questions however not engage in conversation and did not follow commands for me. Nito Clifton called this am had spoken with oncology knows there are no other cancer treatment options. We met at bedside this afternoon. Elodia who is also MPOA feels patient has declined and understands he is doing poorly. She does not wish for him to suffer and understands CPR is not a road for recovery for him. Goals of care changed to DNR/DNI, although she understands he is doing poorly and his prognosis is poor wishes to continue current medical  IntraVENous Continuous PRN    amiodarone (NEXTERONE) 360 mg in dextrose 5% 200 ml  0.5 mg/min IntraVENous Continuous    heparin (porcine) injection 4,960 Units  80 Units/kg IntraVENous PRN    heparin (porcine) injection 2,480 Units  40 Units/kg IntraVENous PRN    [Held by provider] heparin 25,000 units in dextrose 5% 250 mL (premix) infusion  5-30 Units/kg/hr IntraVENous Continuous    famotidine (PEPCID) 20 mg in sodium chloride (PF) 0.9 % 10 mL injection  20 mg IntraVENous Daily        LAB AND IMAGING FINDINGS:     Lab Results   Component Value Date/Time    WBC 0.8 01/05/2024 09:47 AM    HGB 8.6 01/05/2024 09:47 AM    PLT 23 01/05/2024 09:47 AM     Lab Results   Component Value Date/Time     01/05/2024 05:05 AM    K 3.5 01/05/2024 05:05 AM     01/05/2024 05:05 AM    CO2 25 01/05/2024 05:05 AM    BUN 65 01/05/2024 05:05 AM    MG 2.1 01/05/2024 05:05 AM    PHOS 1.9 01/05/2024 05:05 AM      Lab Results   Component Value Date/Time    GLOB 3.7 01/04/2024 06:35 PM     Lab Results   Component Value Date/Time    INR 1.4 01/04/2024 06:35 PM    APTT 49.2 01/04/2024 03:44 AM      No results found for: \"IRON\", \"TIBC\", \"IBCT\", \"FERR\"   No results found for: \"PH\", \"PCO2\", \"PO2\"  No components found for: \"GLPOC\"   No results found for: \"CPK\", \"CKMB\", \"TROPONINI\"           Total time:  50 minutes

## 2024-01-05 NOTE — PROGRESS NOTES
Infectious Disease progress Note        Reason: sepsis     Current abx Prior abx   Cefepime since 1/2  Vancomycin since 1/1-1/4/24 Pip/tazo 1/1     Lines:       Assessment :  74y/o Male with a PMHx of stage IV non-small cell lung cancer, malignant pleural effusion, newly diagnosed adenocarcinoma on chemotherapy-last treatment 12/19/2023, Pulmonary hypertension, CKD, Gout, and recent Acute PE along with PTX who presented to [facility] ED on 1/1/24 due to altered mental status.    Clinical presentation consistent with sepsis-present on admission due to left lower lobe pneumonia superimposed on underlying lung malignancy, probable cystitis  Exact microbial etiology of left lower lung pneumonia not entirely clear- ?  Aspiration pneumonia versus community-acquired pneumonia    colitis noted on ct scan 1/2- ?ischemic    Probable cystitis 4+ bacteria noted on UA 1/1,hematuria concerning for cystitis.  Patient may not have significant pyuria due to concurrent neutropenic state    Neutropenia/thrombocytopenia-likely secondary to recent chemotherapy    Acute on chronic kidney injury-likely due to volume depletion    Altered mentation-likely metabolic encephalopathy.  Cefepime can contribute to this too    Pt was transferred to the stepdown bed because of hypotension A-fib with RVR not getting controlled medications.  Patient remained confused     Recommendations:    D/c cefepime - start pip/tazo  Follow-up oncology recommendations regarding further chemotherapy, goals of care, neutropenia  Follow-up nephrology recommendations  Follow up palliative care recommendations regarding goals of care      Above plan was discussed in details with primary team, RN. Please call me if any further questions or concerns. Will continue to participate in the care of this patient.  HPI:      Confused. Reliability of review of system is questionable      Past Medical History:   Diagnosis Date    CKD (chronic kidney disease) stage 4, GFR 15-29 ml/min  (HCC) 11/22/2023    Gout     HTN (hypertension)     Primary hypertension 11/22/2023       Past Surgical History:   Procedure Laterality Date    CT NEEDLE BIOPSY PLEURA PERCUTANEOUS  11/27/2023    CT NEEDLE BIOPSY PLEURA PERCUTANEOUS 11/27/2023 [facility] RAD CT    IR PORT PLACEMENT EQUAL OR GREATER THAN 5 YEARS  12/15/2023    IR PORT PLACEMENT EQUAL OR GREATER THAN 5 YEARS 12/15/2023 Jeffrey Sosa MD [facility] RAD ANGIO IR       [unfilled]    Current Facility-Administered Medications   Medication Dose Route Frequency    0.9 % sodium chloride infusion   IntraVENous PRN    [Held by provider] phosphorus (K PHOS NEUTRAL) 1 tablet  250 mg Oral BID    midodrine (PROAMATINE) tablet 10 mg  10 mg Oral TID WC    0.9 % sodium chloride infusion   IntraVENous PRN    metroNIDAZOLE (FLAGYL) 500 mg in 0.9% NaCl 100 mL IVPB premix  500 mg IntraVENous Q12H    sodium bicarbonate 75 mEq in dextrose 5 % 1,000 mL infusion   IntraVENous Continuous    HYDROmorphone (DILAUDID) injection 0.5 mg  0.5 mg IntraVENous Q4H PRN    filgrastim-aafi (NIVESTYM) injection 300 mcg  300 mcg SubCUTAneous Daily    [Held by provider] sodium bicarbonate tablet 650 mg  650 mg Oral BID    [Held by provider] therapeutic multivitamin-minerals 1 tablet  1 tablet Oral Daily    [Held by provider] polyethylene glycol (GLYCOLAX) packet 17 g  17 g Oral Daily    sodium chloride flush 0.9 % injection 5-40 mL  5-40 mL IntraVENous 2 times per day    sodium chloride flush 0.9 % injection 5-40 mL  5-40 mL IntraVENous PRN    ondansetron (ZOFRAN-ODT) disintegrating tablet 4 mg  4 mg Oral Q8H PRN    Or    ondansetron (ZOFRAN) injection 4 mg  4 mg IntraVENous Q6H PRN    polyethylene glycol (GLYCOLAX) packet 17 g  17 g Oral Daily PRN    acetaminophen (TYLENOL) tablet 650 mg  650 mg Oral Q6H PRN    dextrose bolus 10% 250 mL  250 mL IntraVENous Once    glucose chewable tablet 16 g  4 tablet Oral PRN    dextrose bolus 10% 125 mL  125 mL IntraVENous PRN    Or    dextrose bolus 10% 250

## 2024-01-06 LAB
ALBUMIN SERPL-MCNC: 2 G/DL (ref 3.4–5)
ANION GAP SERPL CALC-SCNC: 5 MMOL/L (ref 3–18)
BASOPHILS # BLD: 0 K/UL (ref 0–0.1)
BASOPHILS NFR BLD: 0 % (ref 0–2)
BLD PROD TYP BPU: NORMAL
BLOOD BANK DISPENSE STATUS: NORMAL
BPU ID: NORMAL
BUN SERPL-MCNC: 44 MG/DL (ref 7–18)
BUN/CREAT SERPL: 20 (ref 12–20)
CALCIUM SERPL-MCNC: 8.2 MG/DL (ref 8.5–10.1)
CHLORIDE SERPL-SCNC: 120 MMOL/L (ref 100–111)
CO2 SERPL-SCNC: 27 MMOL/L (ref 21–32)
CREAT SERPL-MCNC: 2.21 MG/DL (ref 0.6–1.3)
DIFFERENTIAL METHOD BLD: ABNORMAL
EOSINOPHIL # BLD: 0 K/UL (ref 0–0.4)
EOSINOPHIL NFR BLD: 0 % (ref 0–5)
ERYTHROCYTE [DISTWIDTH] IN BLOOD BY AUTOMATED COUNT: 15 % (ref 11.6–14.5)
GLUCOSE BLD STRIP.AUTO-MCNC: 130 MG/DL (ref 70–110)
GLUCOSE BLD STRIP.AUTO-MCNC: 147 MG/DL (ref 70–110)
GLUCOSE BLD STRIP.AUTO-MCNC: 147 MG/DL (ref 70–110)
GLUCOSE BLD STRIP.AUTO-MCNC: 158 MG/DL (ref 70–110)
GLUCOSE BLD STRIP.AUTO-MCNC: 165 MG/DL (ref 70–110)
GLUCOSE SERPL-MCNC: 129 MG/DL (ref 74–99)
HCT VFR BLD AUTO: 28.6 % (ref 36–48)
HGB BLD-MCNC: 9.5 G/DL (ref 13–16)
IMM GRANULOCYTES # BLD AUTO: 0 K/UL (ref 0–0.04)
IMM GRANULOCYTES NFR BLD AUTO: 0 % (ref 0–0.5)
LYMPHOCYTES # BLD: 0.5 K/UL (ref 0.9–3.6)
LYMPHOCYTES NFR BLD: 48 % (ref 21–52)
MAGNESIUM SERPL-MCNC: 2 MG/DL (ref 1.6–2.6)
MCH RBC QN AUTO: 27.5 PG (ref 24–34)
MCHC RBC AUTO-ENTMCNC: 33.2 G/DL (ref 31–37)
MCV RBC AUTO: 82.9 FL (ref 78–100)
MONOCYTES # BLD: 0 K/UL (ref 0.05–1.2)
MONOCYTES NFR BLD: 0 % (ref 3–10)
NEUTS SEG # BLD: 0.5 K/UL (ref 1.8–8)
NEUTS SEG NFR BLD: 52 % (ref 40–73)
NRBC # BLD: 0 K/UL (ref 0–0.01)
NRBC BLD-RTO: 0 PER 100 WBC
PHOSPHATE SERPL-MCNC: 2.4 MG/DL (ref 2.5–4.9)
PLATELET # BLD AUTO: 9 K/UL (ref 135–420)
PLATELET COMMENT: ABNORMAL
PMV BLD AUTO: 12.6 FL (ref 9.2–11.8)
POTASSIUM SERPL-SCNC: 3.8 MMOL/L (ref 3.5–5.5)
RBC # BLD AUTO: 3.45 M/UL (ref 4.35–5.65)
RBC MORPH BLD: ABNORMAL
RBC MORPH BLD: ABNORMAL
SODIUM SERPL-SCNC: 152 MMOL/L (ref 136–145)
TOTAL CELLS COUNTED SPEC: 25
UNIT DIVISION: 0
WBC # BLD AUTO: 1 K/UL (ref 4.6–13.2)

## 2024-01-06 PROCEDURE — 6360000002 HC RX W HCPCS: Performed by: INTERNAL MEDICINE

## 2024-01-06 PROCEDURE — 6370000000 HC RX 637 (ALT 250 FOR IP): Performed by: INTERNAL MEDICINE

## 2024-01-06 PROCEDURE — 92526 ORAL FUNCTION THERAPY: CPT

## 2024-01-06 PROCEDURE — 36415 COLL VENOUS BLD VENIPUNCTURE: CPT

## 2024-01-06 PROCEDURE — 2580000003 HC RX 258: Performed by: INTERNAL MEDICINE

## 2024-01-06 PROCEDURE — 36430 TRANSFUSION BLD/BLD COMPNT: CPT

## 2024-01-06 PROCEDURE — 99233 SBSQ HOSP IP/OBS HIGH 50: CPT | Performed by: INTERNAL MEDICINE

## 2024-01-06 PROCEDURE — 2580000003 HC RX 258: Performed by: PHYSICIAN ASSISTANT

## 2024-01-06 PROCEDURE — 82962 GLUCOSE BLOOD TEST: CPT

## 2024-01-06 PROCEDURE — 85025 COMPLETE CBC W/AUTO DIFF WBC: CPT

## 2024-01-06 PROCEDURE — 2580000003 HC RX 258

## 2024-01-06 PROCEDURE — 2700000000 HC OXYGEN THERAPY PER DAY

## 2024-01-06 PROCEDURE — 80069 RENAL FUNCTION PANEL: CPT

## 2024-01-06 PROCEDURE — 94761 N-INVAS EAR/PLS OXIMETRY MLT: CPT

## 2024-01-06 PROCEDURE — 83735 ASSAY OF MAGNESIUM: CPT

## 2024-01-06 PROCEDURE — P9073 PLATELETS PHERESIS PATH REDU: HCPCS

## 2024-01-06 PROCEDURE — 2500000003 HC RX 250 WO HCPCS: Performed by: PHYSICIAN ASSISTANT

## 2024-01-06 PROCEDURE — 1100000000 HC RM PRIVATE

## 2024-01-06 RX ORDER — SODIUM CHLORIDE 9 MG/ML
INJECTION, SOLUTION INTRAVENOUS PRN
Status: DISCONTINUED | OUTPATIENT
Start: 2024-01-06 | End: 2024-01-07 | Stop reason: ALTCHOICE

## 2024-01-06 RX ORDER — DEXTROSE MONOHYDRATE 50 MG/ML
INJECTION, SOLUTION INTRAVENOUS CONTINUOUS
Status: DISCONTINUED | OUTPATIENT
Start: 2024-01-06 | End: 2024-01-07

## 2024-01-06 RX ADMIN — SODIUM CHLORIDE, PRESERVATIVE FREE 20 ML: 5 INJECTION INTRAVENOUS at 20:30

## 2024-01-06 RX ADMIN — SODIUM CHLORIDE, PRESERVATIVE FREE 20 MG: 5 INJECTION INTRAVENOUS at 09:02

## 2024-01-06 RX ADMIN — SODIUM CHLORIDE, PRESERVATIVE FREE 10 ML: 5 INJECTION INTRAVENOUS at 09:04

## 2024-01-06 RX ADMIN — DEXTROSE MONOHYDRATE: 50 INJECTION, SOLUTION INTRAVENOUS at 20:30

## 2024-01-06 RX ADMIN — PIPERACILLIN AND TAZOBACTAM 3375 MG: 3; .375 INJECTION, POWDER, FOR SOLUTION INTRAVENOUS at 02:17

## 2024-01-06 RX ADMIN — DEXTROSE MONOHYDRATE: 50 INJECTION, SOLUTION INTRAVENOUS at 10:24

## 2024-01-06 RX ADMIN — AMIODARONE HYDROCHLORIDE 0.5 MG/MIN: 1.8 INJECTION, SOLUTION INTRAVENOUS at 03:36

## 2024-01-06 RX ADMIN — PIPERACILLIN AND TAZOBACTAM 3375 MG: 3; .375 INJECTION, POWDER, FOR SOLUTION INTRAVENOUS at 16:30

## 2024-01-06 RX ADMIN — PIPERACILLIN AND TAZOBACTAM 3375 MG: 3; .375 INJECTION, POWDER, FOR SOLUTION INTRAVENOUS at 09:03

## 2024-01-06 RX ADMIN — FILGRASTIM-AAFI 300 MCG: 300 INJECTION, SOLUTION SUBCUTANEOUS at 09:04

## 2024-01-06 RX ADMIN — AMIODARONE HYDROCHLORIDE 0.5 MG/MIN: 1.8 INJECTION, SOLUTION INTRAVENOUS at 16:30

## 2024-01-06 ASSESSMENT — PAIN SCALES - GENERAL
PAINLEVEL_OUTOF10: 0

## 2024-01-06 NOTE — SIGNIFICANT EVENT
INTERIM UPDATE - 2134 EST on 1/05/2024    Nursing Staff reports that Patient is confused.  Patient is pulling at IV lines.  Per Nursing Staff, Patient is not able to be redirected at this time.    Plan:  Ordered Bilateral Secured Mitts.  Will discontinue Restraints when Restraints impose greater risk of harm than they prevent or when Patient no longer presents a threat to themself or others (to include reliably following commands or being able to be reoriented away from harmful behaviors).        INTERIM UPDATE - 0653 EST on 1/06/2024    Nursing Staff calls to report that AM labs have returned with abnormal findings of Platelets 9 K.    Plan:  Ordered Transfusion of 1 unit of Leuko-reduced Platelets with a repeat CBC 1 hour after Transfusion finishes.

## 2024-01-06 NOTE — PROGRESS NOTES
Hospitalist Progress Note    NAME:  Chad Sorto   :   1950   MRN:   [id number]     Date/Time:  2024  Subjective: pt is very complicated with multiple issues;   Chief Complaint:    Patient remain confused requiring restraints.  Heart rate is better.  92.  Platelets are down to 9 today getting 1 unit of platelets      Review of Systems:  Y  N       Y  N  []   []    Fever/chills                                               []   []    Chest Pain  []   []    Cough                                                       []   []    Diarrhea   []   []    Sputum                                                     []   []    Constipation  []   []    SOB/MORALES                                                []   []    Nausea/Vomit  []   []    Abd Pain                                                    []   []    Tolerating PT  []   []    Dysuria                                                      []   []    Tolerating Diet     [x]  Unable to obtain  ROS due to  [x]  mental status change  []  sedated   []  intubated     Past Med History and Social history reviewed. No changes.   [x]  Current Medication list and allergies reviewed*    Objective:   Vitals:  /79   Pulse 92   Temp 97.3 °F (36.3 °C) (Temporal)   Resp 18   Ht 1.778 m (5' 10\")   Wt 62 kg (136 lb 11 oz)   SpO2 100%   BMI 19.61 kg/m²   Temp (24hrs), Av.4 °F (36.3 °C), Min:97.3 °F (36.3 °C), Max:97.7 °F (36.5 °C)      Last 24hr Input/Output:    Intake/Output Summary (Last 24 hours) at 2024 0949  Last data filed at 2024 0652  Gross per 24 hour   Intake 2245.4 ml   Output 1750 ml   Net 495.4 ml        PHYSICAL EXAM:  Gen:  [x]  WD []  WN  []  cachectic  []  thin  []  obese  [x]  disheveled             []   Critically ill or  []  Chronically ill appearing    HEENT:   []   red/pink conjunctivae [x]  pale conjunctivae                  PERRL  []  yes  []  no        hearing intact to voice []  yes  []  No               []  moist or  [x]  dry   IVPB (mini-bag)  3,375 mg IntraVENous Q8H    dextrose 5 % and 0.45 % sodium chloride infusion   IntraVENous Continuous    0.9 % sodium chloride infusion   IntraVENous PRN    [Held by provider] phosphorus (K PHOS NEUTRAL) 1 tablet  250 mg Oral BID    midodrine (PROAMATINE) tablet 10 mg  10 mg Oral TID WC    0.9 % sodium chloride infusion   IntraVENous PRN    HYDROmorphone (DILAUDID) injection 0.5 mg  0.5 mg IntraVENous Q4H PRN    filgrastim-aafi (NIVESTYM) injection 300 mcg  300 mcg SubCUTAneous Daily    [Held by provider] sodium bicarbonate tablet 650 mg  650 mg Oral BID    [Held by provider] therapeutic multivitamin-minerals 1 tablet  1 tablet Oral Daily    [Held by provider] polyethylene glycol (GLYCOLAX) packet 17 g  17 g Oral Daily    sodium chloride flush 0.9 % injection 5-40 mL  5-40 mL IntraVENous 2 times per day    sodium chloride flush 0.9 % injection 5-40 mL  5-40 mL IntraVENous PRN    ondansetron (ZOFRAN-ODT) disintegrating tablet 4 mg  4 mg Oral Q8H PRN    Or    ondansetron (ZOFRAN) injection 4 mg  4 mg IntraVENous Q6H PRN    polyethylene glycol (GLYCOLAX) packet 17 g  17 g Oral Daily PRN    acetaminophen (TYLENOL) tablet 650 mg  650 mg Oral Q6H PRN    dextrose bolus 10% 250 mL  250 mL IntraVENous Once    glucose chewable tablet 16 g  4 tablet Oral PRN    dextrose bolus 10% 125 mL  125 mL IntraVENous PRN    Or    dextrose bolus 10% 250 mL  250 mL IntraVENous PRN    glucagon injection 1 mg  1 mg SubCUTAneous PRN    dextrose 10 % infusion   IntraVENous Continuous PRN    amiodarone (NEXTERONE) 360 mg in dextrose 5% 200 ml  0.5 mg/min IntraVENous Continuous    heparin (porcine) injection 4,960 Units  80 Units/kg IntraVENous PRN    heparin (porcine) injection 2,480 Units  40 Units/kg IntraVENous PRN    [Held by provider] heparin 25,000 units in dextrose 5% 250 mL (premix) infusion  5-30 Units/kg/hr IntraVENous Continuous    famotidine (PEPCID) 20 mg in sodium chloride (PF) 0.9 % 10 mL injection  20 mg

## 2024-01-06 NOTE — PROGRESS NOTES
Bedside and Verbal shift change report given by Carl Gates RN (offgoing nurse). Report included the following information Nurse Handoff Report, Intake/Output, Recent Results, and Cardiac Rhythm Afib .      1930: alert, with limited verbal responses; generally weak, nonambulatory; with ongoing Amiodarone drip infusing well; shift assessment done; bed alarms on; afib on tele    2200: suctioned oral secretions with yankauer        : had a medium amount of soft loose brown stools; travis care done    0020: still attempts to pull lines out; bilateral mittens on    0200: suctioned oral secretions; repositioned in bed    0400: had a medium amount of soft loose brown stools; travis care done    0600: suctioned oral secretions; observed aspiration precautions    0620: ; IVF infusing well    Bedside and Verbal shift change report given to Carl Gates RN (oncoming nurse) by Keira Jin RN (offgoing nurse). Report included the following information Nurse Handoff Report, Index, Intake/Output, Recent Results, and Cardiac Rhythm Afib .

## 2024-01-06 NOTE — PLAN OF CARE
Problem: SLP Adult - Impaired Swallowing  Goal: By Discharge: Advance to least restrictive diet without signs or symptoms of aspiration for planned discharge setting.  See evaluation for individualized goals.  Description: Patient will:  1. Tolerate PO trials with 0 s/s overt distress in 4/5 trials  2. Utilize compensatory swallow strategies/maneuvers (decrease bite/sip, size/rate, alt. liq/sol) with min cues in 4/5 trials  3. Perform oral-motor/laryngeal exercises to increase oropharyngeal swallow function with min cues  4. Complete an objective swallow study (i.e., MBSS) to assess swallow integrity, r/o aspiration, and determine of safest LRD, min A    Recommend:   NPO with alternate means of nutrition/hydration vs comfort feeds   Strict aspiration precautions (HOB >30 degrees at all times, Oral care TID)    Outcome: Not Progressing   SPEECH LANGUAGE PATHOLOGY DYSPHAGIA TREATMENT    Patient: Chad Sorto (73 y.o. male)  Date: 1/6/2024  Diagnosis: Paroxysmal A-fib (HCC) [I48.0]  Sepsis (HCC) [A41.9]  Severe sepsis (HCC) [A41.9, R65.20] Severe sepsis (HCC)      Precautions: Standard, aspiration  PLOF: As per H&P      ASSESSMENT:  Pt seen for follow up dysphagia management. He was nonverbal this date. Pt demo weak/wet/congested cough s/p one half tsp trial of honey-thick liquids. Laryngeal elevation was weak/delayed to palpation. No further PO given secondary to high risk of aspiration. Pt demo inadequate sensory/motor awareness/strength for safe and adequate nutritional intake. Pt continues to appear to be at an extremely high risk of aspiration, malnutrition, and dehydration with PO. Rec NPO with consideration of an alternate means of nutrition/hydration vs comfort feeds, aspiration precautions, and oral care with suctioning. Will continue to follow for further dysphagia management.     Progression toward goals:  []         Improving appropriately and progressing toward goals  []         Improving slowly and  Suspected pharyngeal residue  Effective Modifications: None  Cues for Modifications: Maximal          DYSPHAGIA DIAGNOSIS: Dysphagia Diagnosis: Moderate oral stage dysphagia, Severe pharyngeal stage dysphagia    PAIN:  Start of Tx: 0  End of Tx: 0     After treatment:   []              Patient left in no apparent distress sitting up in chair  [x]              Patient left in no apparent distress in bed  [x]              Call bell left within reach  [x]              Nursing notified  []              Family present  []              Caregiver present  []              Bed alarm activated    COMMUNICATION/EDUCATION:   [x]            Aspiration precautions; swallow safety; compensatory techniques provided via demonstration, verbalization and teach back of comprehension  []         Patient/family have participated as able in goal setting and plan of care.  []            Patient/family agree to work toward stated goals and plan of care.  []            Patient understands intent and goals of therapy, neutral about participation.  [x]            Patient unable to participate in goal setting/plan of care secondary to cognition, hearing/vision deficits; education ongoing with interdisciplinary staff   []            Handout regarding diet recommendations and thickener instructions provided.  []         Posted safety precautions in patient's room.  Thank you for this referral.    Nenita Sotelo M.S., CCC-SLP/L  Speech-Language Pathologist

## 2024-01-07 LAB
ABO + RH BLD: NORMAL
ALBUMIN SERPL-MCNC: 1.7 G/DL (ref 3.4–5)
ANION GAP SERPL CALC-SCNC: 5 MMOL/L (ref 3–18)
BACTERIA SPEC CULT: NORMAL
BACTERIA SPEC CULT: NORMAL
BASOPHILS # BLD: 0 K/UL (ref 0–0.1)
BASOPHILS NFR BLD: 0 % (ref 0–2)
BLD PROD TYP BPU: NORMAL
BLOOD BANK DISPENSE STATUS: NORMAL
BLOOD GROUP ANTIBODIES SERPL: NORMAL
BPU ID: NORMAL
BUN SERPL-MCNC: 32 MG/DL (ref 7–18)
BUN/CREAT SERPL: 15 (ref 12–20)
CALCIUM SERPL-MCNC: 7.7 MG/DL (ref 8.5–10.1)
CALLED TO: NORMAL
CHLORIDE SERPL-SCNC: 114 MMOL/L (ref 100–111)
CO2 SERPL-SCNC: 26 MMOL/L (ref 21–32)
CREAT SERPL-MCNC: 2.15 MG/DL (ref 0.6–1.3)
DIFFERENTIAL METHOD BLD: ABNORMAL
EOSINOPHIL # BLD: 0 K/UL (ref 0–0.4)
EOSINOPHIL NFR BLD: 0 % (ref 0–5)
ERYTHROCYTE [DISTWIDTH] IN BLOOD BY AUTOMATED COUNT: 14.9 % (ref 11.6–14.5)
GLUCOSE BLD STRIP.AUTO-MCNC: 109 MG/DL (ref 70–110)
GLUCOSE BLD STRIP.AUTO-MCNC: 116 MG/DL (ref 70–110)
GLUCOSE BLD STRIP.AUTO-MCNC: 119 MG/DL (ref 70–110)
GLUCOSE SERPL-MCNC: 130 MG/DL (ref 74–99)
HCT VFR BLD AUTO: 23.8 % (ref 36–48)
HGB BLD-MCNC: 7.8 G/DL (ref 13–16)
IMM GRANULOCYTES # BLD AUTO: 0 K/UL (ref 0–0.04)
IMM GRANULOCYTES NFR BLD AUTO: 0 % (ref 0–0.5)
LYMPHOCYTES # BLD: 0.3 K/UL (ref 0.9–3.6)
LYMPHOCYTES NFR BLD: 32 % (ref 21–52)
MAGNESIUM SERPL-MCNC: 1.8 MG/DL (ref 1.6–2.6)
MCH RBC QN AUTO: 27.7 PG (ref 24–34)
MCHC RBC AUTO-ENTMCNC: 32.8 G/DL (ref 31–37)
MCV RBC AUTO: 84.4 FL (ref 78–100)
MONOCYTES # BLD: 0 K/UL (ref 0.05–1.2)
MONOCYTES NFR BLD: 4 % (ref 3–10)
NEUTS SEG # BLD: 0.7 K/UL (ref 1.8–8)
NEUTS SEG NFR BLD: 64 % (ref 40–73)
NRBC # BLD: 0 K/UL (ref 0–0.01)
NRBC BLD-RTO: 0 PER 100 WBC
PHOSPHATE SERPL-MCNC: 1.9 MG/DL (ref 2.5–4.9)
PLATELET # BLD AUTO: 9 K/UL (ref 135–420)
PLATELET COMMENT: ABNORMAL
PMV BLD AUTO: 9.2 FL (ref 9.2–11.8)
POTASSIUM SERPL-SCNC: 3.2 MMOL/L (ref 3.5–5.5)
POTASSIUM SERPL-SCNC: 4 MMOL/L (ref 3.5–5.5)
RBC # BLD AUTO: 2.82 M/UL (ref 4.35–5.65)
RBC MORPH BLD: ABNORMAL
SERVICE CMNT-IMP: NORMAL
SERVICE CMNT-IMP: NORMAL
SODIUM SERPL-SCNC: 145 MMOL/L (ref 136–145)
SPECIMEN EXP DATE BLD: NORMAL
TOTAL CELLS COUNTED SPEC: 25
UNIT DIVISION: 0
WBC # BLD AUTO: 1 K/UL (ref 4.6–13.2)

## 2024-01-07 PROCEDURE — 36430 TRANSFUSION BLD/BLD COMPNT: CPT

## 2024-01-07 PROCEDURE — 36415 COLL VENOUS BLD VENIPUNCTURE: CPT

## 2024-01-07 PROCEDURE — 1100000000 HC RM PRIVATE

## 2024-01-07 PROCEDURE — 6360000002 HC RX W HCPCS: Performed by: INTERNAL MEDICINE

## 2024-01-07 PROCEDURE — 2580000003 HC RX 258: Performed by: STUDENT IN AN ORGANIZED HEALTH CARE EDUCATION/TRAINING PROGRAM

## 2024-01-07 PROCEDURE — 86850 RBC ANTIBODY SCREEN: CPT

## 2024-01-07 PROCEDURE — 2580000003 HC RX 258

## 2024-01-07 PROCEDURE — 2700000000 HC OXYGEN THERAPY PER DAY

## 2024-01-07 PROCEDURE — 2500000003 HC RX 250 WO HCPCS: Performed by: PHYSICIAN ASSISTANT

## 2024-01-07 PROCEDURE — 2580000003 HC RX 258: Performed by: INTERNAL MEDICINE

## 2024-01-07 PROCEDURE — 86901 BLOOD TYPING SEROLOGIC RH(D): CPT

## 2024-01-07 PROCEDURE — 83735 ASSAY OF MAGNESIUM: CPT

## 2024-01-07 PROCEDURE — 6360000002 HC RX W HCPCS: Performed by: STUDENT IN AN ORGANIZED HEALTH CARE EDUCATION/TRAINING PROGRAM

## 2024-01-07 PROCEDURE — 2500000003 HC RX 250 WO HCPCS: Performed by: INTERNAL MEDICINE

## 2024-01-07 PROCEDURE — 80069 RENAL FUNCTION PANEL: CPT

## 2024-01-07 PROCEDURE — A4216 STERILE WATER/SALINE, 10 ML: HCPCS | Performed by: PHYSICIAN ASSISTANT

## 2024-01-07 PROCEDURE — 6370000000 HC RX 637 (ALT 250 FOR IP): Performed by: INTERNAL MEDICINE

## 2024-01-07 PROCEDURE — 85025 COMPLETE CBC W/AUTO DIFF WBC: CPT

## 2024-01-07 PROCEDURE — 2580000003 HC RX 258: Performed by: PHYSICIAN ASSISTANT

## 2024-01-07 PROCEDURE — 84132 ASSAY OF SERUM POTASSIUM: CPT

## 2024-01-07 PROCEDURE — P9073 PLATELETS PHERESIS PATH REDU: HCPCS

## 2024-01-07 PROCEDURE — 94761 N-INVAS EAR/PLS OXIMETRY MLT: CPT

## 2024-01-07 PROCEDURE — 82962 GLUCOSE BLOOD TEST: CPT

## 2024-01-07 PROCEDURE — 86900 BLOOD TYPING SEROLOGIC ABO: CPT

## 2024-01-07 RX ORDER — SODIUM CHLORIDE 9 MG/ML
INJECTION, SOLUTION INTRAVENOUS PRN
Status: DISCONTINUED | OUTPATIENT
Start: 2024-01-07 | End: 2024-01-07

## 2024-01-07 RX ORDER — POTASSIUM CHLORIDE 7.45 MG/ML
10 INJECTION INTRAVENOUS PRN
Status: DISCONTINUED | OUTPATIENT
Start: 2024-01-07 | End: 2024-01-12 | Stop reason: HOSPADM

## 2024-01-07 RX ORDER — MAGNESIUM SULFATE IN WATER 40 MG/ML
2000 INJECTION, SOLUTION INTRAVENOUS PRN
Status: DISCONTINUED | OUTPATIENT
Start: 2024-01-07 | End: 2024-01-08

## 2024-01-07 RX ORDER — POTASSIUM CHLORIDE 29.8 MG/ML
20 INJECTION INTRAVENOUS PRN
Status: DISCONTINUED | OUTPATIENT
Start: 2024-01-07 | End: 2024-01-12 | Stop reason: HOSPADM

## 2024-01-07 RX ORDER — SODIUM CHLORIDE 9 MG/ML
INJECTION, SOLUTION INTRAVENOUS PRN
Status: COMPLETED | OUTPATIENT
Start: 2024-01-07 | End: 2024-01-07

## 2024-01-07 RX ORDER — DEXTROSE MONOHYDRATE, SODIUM CHLORIDE, AND POTASSIUM CHLORIDE 50; 2.98; 4.5 G/1000ML; G/1000ML; G/1000ML
INJECTION, SOLUTION INTRAVENOUS CONTINUOUS
Status: DISCONTINUED | OUTPATIENT
Start: 2024-01-07 | End: 2024-01-10

## 2024-01-07 RX ADMIN — FILGRASTIM-AAFI 300 MCG: 300 INJECTION, SOLUTION SUBCUTANEOUS at 11:28

## 2024-01-07 RX ADMIN — MIDODRINE HYDROCHLORIDE 10 MG: 10 TABLET ORAL at 17:16

## 2024-01-07 RX ADMIN — PIPERACILLIN AND TAZOBACTAM 3375 MG: 3; .375 INJECTION, POWDER, FOR SOLUTION INTRAVENOUS at 00:15

## 2024-01-07 RX ADMIN — POTASSIUM CHLORIDE, DEXTROSE MONOHYDRATE AND SODIUM CHLORIDE: 300; 5; 450 INJECTION, SOLUTION INTRAVENOUS at 11:39

## 2024-01-07 RX ADMIN — MIDODRINE HYDROCHLORIDE 10 MG: 10 TABLET ORAL at 11:21

## 2024-01-07 RX ADMIN — SODIUM CHLORIDE, PRESERVATIVE FREE 10 ML: 5 INJECTION INTRAVENOUS at 08:14

## 2024-01-07 RX ADMIN — PIPERACILLIN AND TAZOBACTAM 3375 MG: 3; .375 INJECTION, POWDER, FOR SOLUTION INTRAVENOUS at 08:18

## 2024-01-07 RX ADMIN — SODIUM CHLORIDE: 9 INJECTION, SOLUTION INTRAVENOUS at 11:12

## 2024-01-07 RX ADMIN — POTASSIUM CHLORIDE 10 MEQ: 7.46 INJECTION, SOLUTION INTRAVENOUS at 09:24

## 2024-01-07 RX ADMIN — AMIODARONE HYDROCHLORIDE 0.5 MG/MIN: 1.8 INJECTION, SOLUTION INTRAVENOUS at 16:42

## 2024-01-07 RX ADMIN — SODIUM CHLORIDE, PRESERVATIVE FREE 10 ML: 5 INJECTION INTRAVENOUS at 22:00

## 2024-01-07 RX ADMIN — POTASSIUM CHLORIDE 10 MEQ: 7.46 INJECTION, SOLUTION INTRAVENOUS at 11:23

## 2024-01-07 RX ADMIN — POTASSIUM CHLORIDE 10 MEQ: 7.46 INJECTION, SOLUTION INTRAVENOUS at 10:22

## 2024-01-07 RX ADMIN — SODIUM CHLORIDE, PRESERVATIVE FREE 20 MG: 5 INJECTION INTRAVENOUS at 08:14

## 2024-01-07 RX ADMIN — DEXTROSE MONOHYDRATE: 50 INJECTION, SOLUTION INTRAVENOUS at 06:36

## 2024-01-07 RX ADMIN — PIPERACILLIN AND TAZOBACTAM 3375 MG: 3; .375 INJECTION, POWDER, FOR SOLUTION INTRAVENOUS at 15:27

## 2024-01-07 RX ADMIN — AMIODARONE HYDROCHLORIDE 0.5 MG/MIN: 1.8 INJECTION, SOLUTION INTRAVENOUS at 04:34

## 2024-01-07 RX ADMIN — POTASSIUM CHLORIDE 10 MEQ: 7.46 INJECTION, SOLUTION INTRAVENOUS at 08:14

## 2024-01-07 RX ADMIN — DEXTROSE MONOHYDRATE: 50 INJECTION, SOLUTION INTRAVENOUS at 07:10

## 2024-01-07 ASSESSMENT — PAIN SCALES - GENERAL
PAINLEVEL_OUTOF10: 0
PAINLEVEL_OUTOF10: 0

## 2024-01-07 NOTE — PLAN OF CARE
Problem: Safety - Adult  Goal: Free from fall injury  Outcome: Progressing     Problem: Pain  Goal: Verbalizes/displays adequate comfort level or baseline comfort level  Outcome: Progressing     Problem: Skin/Tissue Integrity  Goal: Absence of new skin breakdown  Description: 1.  Monitor for areas of redness and/or skin breakdown  2.  Assess vascular access sites hourly  3.  Every 4-6 hours minimum:  Change oxygen saturation probe site  4.  Every 4-6 hours:  If on nasal continuous positive airway pressure, respiratory therapy assess nares and determine need for appliance change or resting period.  Outcome: Progressing     Problem: Nutrition Deficit:  Goal: Optimize nutritional status  Outcome: Progressing     Problem: Safety - Medical Restraint  Goal: Remains free of injury from restraints (Restraint for Interference with Medical Device)  Description: INTERVENTIONS:  1. Determine that other, less restrictive measures have been tried or would not be effective before applying the restraint  2. Evaluate the patient's condition at the time of restraint application  3. Inform patient/family regarding the reason for restraint  4. Q2H: Monitor safety, psychosocial status, comfort, nutrition and hydration  Outcome: Progressing     Problem: Discharge Planning  Goal: Discharge to home or other facility with appropriate resources  Outcome: Progressing     Problem: SLP Adult - Impaired Swallowing  Goal: By Discharge: Advance to least restrictive diet without signs or symptoms of aspiration for planned discharge setting.  See evaluation for individualized goals.  Description: Patient will:  1. Tolerate PO trials with 0 s/s overt distress in 4/5 trials  2. Utilize compensatory swallow strategies/maneuvers (decrease bite/sip, size/rate, alt. liq/sol) with min cues in 4/5 trials  3. Perform oral-motor/laryngeal exercises to increase oropharyngeal swallow function with min cues  4. Complete an objective swallow study (i.e., MBSS)  to assess swallow integrity, r/o aspiration, and determine of safest LRD, min A    Recommend:   NPO with alternate means of nutrition/hydration vs comfort feeds   Strict aspiration precautions (HOB >30 degrees at all times, Oral care TID)    1/6/2024 1528 by Nenita Sotelo, SLP  Outcome: Not Progressing

## 2024-01-07 NOTE — PROGRESS NOTES
Comprehensive Nutrition Assessment    Type and Reason for Visit:  Reassess    Nutrition Recommendations/Plan:   Advance PO diet as tolerated when medically feasible per SLP recs.  Continue to follow for GOC; no feeding tubes per Palliative Care.  Continue to monitor readiness for PO, weight, labs, and plan of care during admission.         Malnutrition Assessment:  Malnutrition Status:  Severe malnutrition (01/03/24 1453)    Context:  Acute Illness     Findings of the 6 clinical characteristics of malnutrition:  Energy Intake:  No significant decrease in energy intake  Weight Loss:  Greater than 5% over 1 month     Body Fat Loss:  Moderate body fat loss Triceps, Fat Overlying Ribs   Muscle Mass Loss:  Moderate muscle mass loss Clavicles (pectoralis & deltoids), Calf (gastrocnemius), Hand (interosseous)  Fluid Accumulation:  No significant fluid accumulation     Strength:  Not Performed    Nutrition Assessment:    Pt admitted with severe sepsis; has recent diagnosis of 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 Diet changed to NPO 1/4 per SLP- rec'd NPO with alternate means of nutrition/hydration vs comfort feeds, last eval 1/6. RRT called 1/4 2/2 melena. Per Palliative Care: POLST completed, pt made DNR/DNI, continue current medical treatment with no escalation of care, no pressor support, no transfer to ICU, no feeding tubes. MPOA is also aware our team will follow up on Monday and if no improvement discuss comfort measures only. Pt continues with NPO status. Nutritional needs are not being met.    Nutrition Related Findings:    Last BM (including prior to admit): 01/06/24  Pertinent Meds: oral meds held, D5 @ 100 ml/hr (120g dex, 408 kcal)   Pertinent Labs: POC Glucose 119-158 mg/dl x 24 hrs, K 3.2 L, BUN/Cr 32/2.15, GFR 31, Phos 1.9 L Wound Type: None       Current Nutrition Intake & Therapies:    Average Meal Intake: NPO  Average Supplements Intake: NPO  Diet NPO    Anthropometric Measures:  Height: 177.8 cm      Megan Dockweiler, MS, RD, [facility]  Contact: [phone]

## 2024-01-07 NOTE — PROGRESS NOTES
Hospitalist Progress Note    NAME:  Chad Sorto   :   1950   MRN:   [id number]     Date/Time:  2024  Subjective: pt is very complicated with multiple issues;   Chief Complaint:    Pt is calm today; still with confusion;  Patient with some myoclonic jerks. Get EEG        Review of Systems:  Y  N       Y  N  []   []    Fever/chills                                               []   []    Chest Pain  []   []    Cough                                                       []   []    Diarrhea   []   []    Sputum                                                     []   []    Constipation  []   []    SOB/MORALES                                                []   []    Nausea/Vomit  []   []    Abd Pain                                                    []   []    Tolerating PT  []   []    Dysuria                                                      []   []    Tolerating Diet     [x]  Unable to obtain  ROS due to  [x]  mental status change  []  sedated   []  intubated     Past Med History and Social history reviewed. No changes.   [x]  Current Medication list and allergies reviewed*    Objective:   Vitals:  /73   Pulse 77   Temp 98.5 °F (36.9 °C)   Resp 15   Ht 1.778 m (5' 10\")   Wt 62 kg (136 lb 11 oz)   SpO2 100%   BMI 19.61 kg/m²   Temp (24hrs), Av.1 °F (36.7 °C), Min:97.1 °F (36.2 °C), Max:98.7 °F (37.1 °C)      Last 24hr Input/Output:    Intake/Output Summary (Last 24 hours) at 2024 1051  Last data filed at 2024 1000  Gross per 24 hour   Intake 3269.8 ml   Output 1200 ml   Net 2069.8 ml        PHYSICAL EXAM:  Gen:  [x]  WD []  WN  []  cachectic  []  thin  []  obese  [x]  disheveled             []   Critically ill or  []  Chronically ill appearing    HEENT:   []   red/pink conjunctivae [x]  pale conjunctivae                  PERRL  []  yes  []  no        hearing intact to voice []  yes  []  No               []  moist or  [x]  dry  Mucosa  NECK:   supple [x]  yes  []  no      masses []   derangements mentioned, also on Cefepime, no brain mets on head CT    Hypotension; resolved;    -New onset Afib/flutter w/ RVR: cardiology following: cardioversion X2 in ED, currently on amio gtt. Hemodynamically now stable. Heparin on hold due to pancytopenia;    On Amiodarone drip and s/p Dig as per cards;    Echo ; 1/3/24;      Left Ventricle: Normal left ventricular systolic function with a visually estimated EF of 55 - 60%. Left ventricle size is normal. Mildly increased wall thickness. Normal wall motion.    Image quality is poor. Limited Doppler study due to patient's condition and procedure performed with the patient in a sitting position.    Stage IV NSC lung ca,  right malignant pleural effusion,adenoca new dx  Carboplatin, pemetreaxed, pembrolizumab cycle 1; 12/19/23  Pancytopenia post chemo    -Moderate pericardial effusion    -Normocytic anemia      Pall care on board; now DNR;      ___________________________________________________    Total time spent with patient:     minutes    []  Critical Care time:      minutes    Care Plan discussed with:    [x]  Patient   []  Family    [x]  Care Manager  [x]  Nursing   []  Consultant/Specialist :      []    >50% of visit spent in counseling and coordination of care   (Discussed []  CODE status,  []  Care Plan, []  D/C Planning)    Prophylaxis:  []  Lovenox  []  Coumadin  []  Hep SQ  [x]  SCD’s  []  H2B/PPI    Disposition:  []  Home w/ Family   []  HH PT,OT,RN   [x]  SNF/LTC   []  SAH/Rehab  ___________________________________________________    Hospitalist: Jayson Bhagat MD     ___________________________________________________    *Medications reviewed:  Current Facility-Administered Medications   Medication Dose Route Frequency    0.9 % sodium chloride infusion   IntraVENous PRN    potassium chloride 20 mEq/50 mL IVPB (Central Line)  20 mEq IntraVENous PRN    Or    potassium chloride 10 mEq/100 mL IVPB (Peripheral Line)  10 mEq IntraVENous PRN

## 2024-01-07 NOTE — PROGRESS NOTES
Dr. Bhagat ordered another unit of Platelets.   Orders verified and made aware patient already received 1 unit of Platelets .   Orders received to discontinue the Platelet orders.

## 2024-01-07 NOTE — PLAN OF CARE
Problem: Safety - Adult  Goal: Free from fall injury  1/7/2024 1054 by Frances Yan RN  Outcome: Progressing  Flowsheets (Taken 1/7/2024 0813)  Free From Fall Injury: Based on caregiver fall risk screen, instruct family/caregiver to ask for assistance with transferring infant if caregiver noted to have fall risk factors  1/7/2024 0121 by Flor Rodriguez RN  Outcome: Progressing     Problem: Pain  Goal: Verbalizes/displays adequate comfort level or baseline comfort level  1/7/2024 1054 by Frances Yan RN  Outcome: Progressing  1/7/2024 0121 by Flor Rodriguez RN  Outcome: Progressing     Problem: Skin/Tissue Integrity  Goal: Absence of new skin breakdown  Description: 1.  Monitor for areas of redness and/or skin breakdown  2.  Assess vascular access sites hourly  3.  Every 4-6 hours minimum:  Change oxygen saturation probe site  4.  Every 4-6 hours:  If on nasal continuous positive airway pressure, respiratory therapy assess nares and determine need for appliance change or resting period.  1/7/2024 0121 by Flor Rodriguez RN  Outcome: Progressing     Problem: Nutrition Deficit:  Goal: Optimize nutritional status  Recent Flowsheet Documentation  Taken 1/7/2024 0906 by Dockweiler, Megan, RD  Nutrient intake appropriate for improving, restoring, or maintaining nutritional needs:   Assess nutritional status and recommend course of action   Monitor oral intake, labs, and treatment plans   Recommend appropriate diets, oral nutritional supplements, and vitamin/mineral supplements  1/7/2024 0121 by Folr Rodriguez RN  Outcome: Progressing     Problem: Discharge Planning  Goal: Discharge to home or other facility with appropriate resources  1/7/2024 0121 by Flor Rodriguez RN  Outcome: Progressing

## 2024-01-07 NOTE — PROGRESS NOTES
Bedside and Verbal shift change report given to Flor RN (oncoming nurse) by Carl RN (offgoing nurse). Report included the following information Nurse Handoff Report, Intake/Output, MAR, Recent Results, and Cardiac Rhythm In & out of SR & Afib . On amiodarone gtt at 0.5mg/min or 16.7 ml/hr. & on IVF of D5W at 100 ml/hr. Both infusing well to Right arm. Quietly resting in bed. HOB elevated. No SOB on oxygen at 2 LPM. No discomfort noted at this time. Call light within reach. Bed exit alarm on. Bilateral mittens in placed.     0000: No change from previous assessment.     0015: Due med given. .     0400: No change from previous assessment.     0600: Turned & repositioned at intervals. Occasional oral suctioning done     0730: Incontinence care provided. Bedside and Verbal shift change report given to Jori ANGEL& Frances RN (oncoming nurse) by Flor RN (offgoing nurse). Report included the following information Nurse Handoff Report, Intake/Output, MAR, Recent Results, and Cardiac Rhythm SR/Afib .

## 2024-01-08 LAB
ALBUMIN SERPL-MCNC: 1.6 G/DL (ref 3.4–5)
AMMONIA PLAS-SCNC: 16 UMOL/L (ref 11–32)
ANION GAP SERPL CALC-SCNC: 5 MMOL/L (ref 3–18)
BASOPHILS # BLD: 0 K/UL (ref 0–0.1)
BASOPHILS NFR BLD: 0 % (ref 0–2)
BLD PROD TYP BPU: NORMAL
BLD PROD TYP BPU: NORMAL
BLOOD BANK CMNT PATIENT-IMP: NORMAL
BLOOD BANK DISPENSE STATUS: NORMAL
BLOOD BANK DISPENSE STATUS: NORMAL
BPU ID: NORMAL
BPU ID: NORMAL
BUN SERPL-MCNC: 25 MG/DL (ref 7–18)
BUN/CREAT SERPL: 12 (ref 12–20)
CALCIUM SERPL-MCNC: 7.6 MG/DL (ref 8.5–10.1)
CALLED TO: NORMAL
CALLED TO: NORMAL
CHLORIDE SERPL-SCNC: 114 MMOL/L (ref 100–111)
CO2 SERPL-SCNC: 25 MMOL/L (ref 21–32)
CREAT SERPL-MCNC: 2.13 MG/DL (ref 0.6–1.3)
DIFFERENTIAL METHOD BLD: ABNORMAL
EOSINOPHIL # BLD: 0 K/UL (ref 0–0.4)
EOSINOPHIL NFR BLD: 2 % (ref 0–5)
ERYTHROCYTE [DISTWIDTH] IN BLOOD BY AUTOMATED COUNT: 15 % (ref 11.6–14.5)
GLUCOSE BLD STRIP.AUTO-MCNC: 115 MG/DL (ref 70–110)
GLUCOSE BLD STRIP.AUTO-MCNC: 120 MG/DL (ref 70–110)
GLUCOSE BLD STRIP.AUTO-MCNC: 125 MG/DL (ref 70–110)
GLUCOSE SERPL-MCNC: 121 MG/DL (ref 74–99)
HCT VFR BLD AUTO: 23.7 % (ref 36–48)
HGB BLD-MCNC: 7.7 G/DL (ref 13–16)
IMM GRANULOCYTES # BLD AUTO: 0 K/UL (ref 0–0.04)
IMM GRANULOCYTES NFR BLD AUTO: 0 % (ref 0–0.5)
LYMPHOCYTES # BLD: 0.7 K/UL (ref 0.9–3.6)
LYMPHOCYTES NFR BLD: 32 % (ref 21–52)
MAGNESIUM SERPL-MCNC: 1.6 MG/DL (ref 1.6–2.6)
MCH RBC QN AUTO: 27.6 PG (ref 24–34)
MCHC RBC AUTO-ENTMCNC: 32.5 G/DL (ref 31–37)
MCV RBC AUTO: 84.9 FL (ref 78–100)
MONOCYTES # BLD: 0 K/UL (ref 0.05–1.2)
MONOCYTES NFR BLD: 0 % (ref 3–10)
NEUTS BAND NFR BLD MANUAL: 2 %
NEUTS SEG # BLD: 1.4 K/UL (ref 1.8–8)
NEUTS SEG NFR BLD: 64 % (ref 40–73)
NRBC # BLD: 0 K/UL (ref 0–0.01)
NRBC BLD-RTO: 0 PER 100 WBC
PHOSPHATE SERPL-MCNC: 1.4 MG/DL (ref 2.5–4.9)
PLATELET # BLD AUTO: 7 K/UL (ref 135–420)
PLATELET COMMENT: ABNORMAL
PMV BLD AUTO: 10.3 FL (ref 9.2–11.8)
POTASSIUM SERPL-SCNC: 3.8 MMOL/L (ref 3.5–5.5)
RBC # BLD AUTO: 2.79 M/UL (ref 4.35–5.65)
RBC MORPH BLD: ABNORMAL
SODIUM SERPL-SCNC: 144 MMOL/L (ref 136–145)
TOTAL CELLS COUNTED SPEC: 50
UNIT DIVISION: 0
UNIT DIVISION: 0
WBC # BLD AUTO: 2.1 K/UL (ref 4.6–13.2)
WBC MORPH BLD: ABNORMAL

## 2024-01-08 PROCEDURE — A4216 STERILE WATER/SALINE, 10 ML: HCPCS | Performed by: PHYSICIAN ASSISTANT

## 2024-01-08 PROCEDURE — 99232 SBSQ HOSP IP/OBS MODERATE 35: CPT | Performed by: INTERNAL MEDICINE

## 2024-01-08 PROCEDURE — 2580000003 HC RX 258: Performed by: PHYSICIAN ASSISTANT

## 2024-01-08 PROCEDURE — 2500000003 HC RX 250 WO HCPCS: Performed by: PHYSICIAN ASSISTANT

## 2024-01-08 PROCEDURE — 2500000003 HC RX 250 WO HCPCS: Performed by: INTERNAL MEDICINE

## 2024-01-08 PROCEDURE — 6360000002 HC RX W HCPCS: Performed by: INTERNAL MEDICINE

## 2024-01-08 PROCEDURE — 6370000000 HC RX 637 (ALT 250 FOR IP): Performed by: PHYSICIAN ASSISTANT

## 2024-01-08 PROCEDURE — 2580000003 HC RX 258: Performed by: INTERNAL MEDICINE

## 2024-01-08 PROCEDURE — 1100000003 HC PRIVATE W/ TELEMETRY

## 2024-01-08 PROCEDURE — 2580000003 HC RX 258

## 2024-01-08 PROCEDURE — 99233 SBSQ HOSP IP/OBS HIGH 50: CPT | Performed by: NURSE PRACTITIONER

## 2024-01-08 PROCEDURE — 36415 COLL VENOUS BLD VENIPUNCTURE: CPT

## 2024-01-08 PROCEDURE — 80069 RENAL FUNCTION PANEL: CPT

## 2024-01-08 PROCEDURE — 82962 GLUCOSE BLOOD TEST: CPT

## 2024-01-08 PROCEDURE — 99497 ADVNCD CARE PLAN 30 MIN: CPT | Performed by: NURSE PRACTITIONER

## 2024-01-08 PROCEDURE — 85025 COMPLETE CBC W/AUTO DIFF WBC: CPT

## 2024-01-08 PROCEDURE — 92526 ORAL FUNCTION THERAPY: CPT

## 2024-01-08 PROCEDURE — 82140 ASSAY OF AMMONIA: CPT

## 2024-01-08 PROCEDURE — 83735 ASSAY OF MAGNESIUM: CPT

## 2024-01-08 PROCEDURE — 94761 N-INVAS EAR/PLS OXIMETRY MLT: CPT

## 2024-01-08 PROCEDURE — 6370000000 HC RX 637 (ALT 250 FOR IP): Performed by: INTERNAL MEDICINE

## 2024-01-08 RX ORDER — SODIUM CHLORIDE 9 MG/ML
INJECTION, SOLUTION INTRAVENOUS PRN
Status: DISCONTINUED | OUTPATIENT
Start: 2024-01-08 | End: 2024-01-08

## 2024-01-08 RX ORDER — GLYCOPYRROLATE 0.2 MG/ML
0.2 INJECTION INTRAMUSCULAR; INTRAVENOUS EVERY 4 HOURS PRN
Status: DISCONTINUED | OUTPATIENT
Start: 2024-01-08 | End: 2024-01-12 | Stop reason: HOSPADM

## 2024-01-08 RX ORDER — MORPHINE SULFATE 2 MG/ML
4 INJECTION, SOLUTION INTRAMUSCULAR; INTRAVENOUS
Status: DISCONTINUED | OUTPATIENT
Start: 2024-01-08 | End: 2024-01-10

## 2024-01-08 RX ORDER — MORPHINE SULFATE 2 MG/ML
1 INJECTION, SOLUTION INTRAMUSCULAR; INTRAVENOUS
Status: DISCONTINUED | OUTPATIENT
Start: 2024-01-08 | End: 2024-01-11

## 2024-01-08 RX ORDER — LORAZEPAM 2 MG/ML
0.5 INJECTION INTRAMUSCULAR EVERY 4 HOURS PRN
Status: DISCONTINUED | OUTPATIENT
Start: 2024-01-08 | End: 2024-01-10

## 2024-01-08 RX ORDER — AMIODARONE HYDROCHLORIDE 200 MG/1
200 TABLET ORAL DAILY
Status: DISCONTINUED | OUTPATIENT
Start: 2024-01-08 | End: 2024-01-12 | Stop reason: HOSPADM

## 2024-01-08 RX ADMIN — FILGRASTIM-AAFI 300 MCG: 300 INJECTION, SOLUTION SUBCUTANEOUS at 11:08

## 2024-01-08 RX ADMIN — POTASSIUM CHLORIDE, DEXTROSE MONOHYDRATE AND SODIUM CHLORIDE: 300; 5; 450 INJECTION, SOLUTION INTRAVENOUS at 01:05

## 2024-01-08 RX ADMIN — PIPERACILLIN AND TAZOBACTAM 3375 MG: 3; .375 INJECTION, POWDER, FOR SOLUTION INTRAVENOUS at 11:09

## 2024-01-08 RX ADMIN — MIDODRINE HYDROCHLORIDE 10 MG: 10 TABLET ORAL at 11:09

## 2024-01-08 RX ADMIN — SODIUM CHLORIDE, PRESERVATIVE FREE 10 ML: 5 INJECTION INTRAVENOUS at 21:40

## 2024-01-08 RX ADMIN — AMIODARONE HYDROCHLORIDE 200 MG: 200 TABLET ORAL at 11:10

## 2024-01-08 RX ADMIN — SODIUM CHLORIDE, PRESERVATIVE FREE 20 MG: 5 INJECTION INTRAVENOUS at 11:08

## 2024-01-08 RX ADMIN — PIPERACILLIN AND TAZOBACTAM 3375 MG: 3; .375 INJECTION, POWDER, FOR SOLUTION INTRAVENOUS at 00:04

## 2024-01-08 RX ADMIN — AMIODARONE HYDROCHLORIDE 0.5 MG/MIN: 1.8 INJECTION, SOLUTION INTRAVENOUS at 05:32

## 2024-01-08 RX ADMIN — SODIUM CHLORIDE, PRESERVATIVE FREE 10 ML: 5 INJECTION INTRAVENOUS at 11:10

## 2024-01-08 ASSESSMENT — PAIN SCALES - GENERAL
PAINLEVEL_OUTOF10: 0

## 2024-01-08 ASSESSMENT — PAIN SCALES - WONG BAKER
WONGBAKER_NUMERICALRESPONSE: 0

## 2024-01-08 NOTE — PROGRESS NOTES
Infectious Disease progress Note        Reason: sepsis     Current abx Prior abx   Cefepime since 1/2-1/5  Pip/tazo since 1/5  Vancomycin since 1/1-1/4/24 Pip/tazo 1/1     Lines:       Assessment :  74y/o Male with a PMHx of stage IV non-small cell lung cancer, malignant pleural effusion, newly diagnosed adenocarcinoma on chemotherapy-last treatment 12/19/2023, Pulmonary hypertension, CKD, Gout, and recent Acute PE along with PTX who presented to [facility] ED on 1/1/24 due to altered mental status.    Clinical presentation consistent with sepsis-present on admission due to left lower lobe pneumonia superimposed on underlying lung malignancy, probable cystitis  Exact microbial etiology of left lower lung pneumonia not entirely clear- ?  Aspiration pneumonia versus community-acquired pneumonia    colitis noted on ct scan 1/2- ?ischemic    Probable cystitis 4+ bacteria noted on UA 1/1,hematuria concerning for cystitis.  Patient may not have significant pyuria due to concurrent neutropenic state    Neutropenia/thrombocytopenia-likely secondary to recent chemotherapy    Acute on chronic kidney injury-likely due to volume depletion    Altered mentation-likely metabolic encephalopathy.  Cefepime can contribute to this too    Pt was transferred to the stepdown bed because of hypotension A-fib with RVR not getting controlled medications.  Patient remained confused     Ongoing discussions about goals of care noted    Recommendations:    cont pip/tazo  Follow-up oncology recommendations regarding further chemotherapy, goals of care, neutropenia  Follow-up nephrology recommendations  Follow up palliative care recommendations regarding goals of care      Above plan was discussed in details with primary team, RN. Please call me if any further questions or concerns. Will continue to participate in the care of this patient.  HPI:      Confused. Reliability of review of system is questionable      Past Medical History:   Diagnosis Date     Not on file   Stress: Not on file   Social Connections: Not on file   Intimate Partner Violence: Not on file   Housing Stability: Low Risk  (2023)    Housing Stability Vital Sign     Unable to Pay for Housing in the Last Year: No     Number of Places Lived in the Last Year: 1     Unstable Housing in the Last Year: No     Social History     Tobacco Use   Smoking Status Never   Smokeless Tobacco Never        Temp (24hrs), Av.3 °F (36.8 °C), Min:97.2 °F (36.2 °C), Max:98.9 °F (37.2 °C)    BP (!) 102/57   Pulse 74   Temp 97.6 °F (36.4 °C) (Temporal)   Resp 22   Ht 1.778 m (5' 10\")   Wt 62 kg (136 lb 11 oz)   SpO2 100%   BMI 19.61 kg/m²     ROS: 12 point ROS obtained in details. Pertinent positives as mentioned in HPI,   otherwise negative    Physical Exam:    General Appearance: NAD, conversant  HENT: normocephalic/atraumatic, moist mucus membranes  Neck: No JVD, supple  Lungs: Coarse crackles to left with normal respiratory effort  CV: RRR  Abdomen: soft, non-tender, normal bowel sounds  Extremities: no cyanosis, no peripheral edema  Neuro: No focal deficits, motor/sensory intact  Skin: Normal color, intact, R chest mediport in place, multipe MTP and DIP nodules on bilateral fingers   Psych: Confused    Labs: Results:   Chemistry Recent Labs     24  0601 24  0442 24  1510 24  0450   GLUCOSE 129* 130*  --  121*   * 145  --  144   K 3.8 3.2* 4.0 3.8   * 114*  --  114*   CO2 27 26  --  25   BUN 44* 32*  --  25*   CREATININE 2.21* 2.15*  --  2.13*        CBC w/Diff Recent Labs     24  0601 24  0442 24  0450   WBC 1.0* 1.0* 2.1*   RBC 3.45* 2.82* 2.79*   HGB 9.5* 7.8* 7.7*   HCT 28.6* 23.8* 23.7*   PLT 9* 9* 7*        Microbiology Results       Procedure Component Value Units Date/Time    Culture, MRSA, Screening [[id number]] Collected: 24 1304    Order Status: Completed Specimen: Nares Updated: 24 0102     Special Requests NO SPECIAL

## 2024-01-08 NOTE — PLAN OF CARE
Problem: Safety - Adult  Goal: Free from fall injury  Outcome: Progressing     Problem: Pain  Goal: Verbalizes/displays adequate comfort level or baseline comfort level  Outcome: Progressing     Problem: Skin/Tissue Integrity  Goal: Absence of new skin breakdown  Description: 1.  Monitor for areas of redness and/or skin breakdown  2.  Assess vascular access sites hourly  3.  Every 4-6 hours minimum:  Change oxygen saturation probe site  4.  Every 4-6 hours:  If on nasal continuous positive airway pressure, respiratory therapy assess nares and determine need for appliance change or resting period.  Outcome: Progressing     Problem: Nutrition Deficit:  Goal: Optimize nutritional status  Outcome: Progressing     Problem: Discharge Planning  Goal: Discharge to home or other facility with appropriate resources  Outcome: Progressing

## 2024-01-08 NOTE — PROGRESS NOTES
Bedside and Verbal shift change report given to Flor RN (oncoming nurse) by Frances NAGEL & Jori RN (offgoing nurse). Report included the following information Nurse Handoff Report, Intake/Output, MAR, Recent Results, and Cardiac Rhythm Afib . Quietly resting in bed. HOB elevated. No SOB on RA. Denies ay pain. No discomfort noted at this time. Call light within reach. Bed exit alarm on.     2245: Incontinence care provided.    2302: No change from previous assessment.    0005: Due med given. .    0200: Comfortably sleeping.     0400: No change  from previous assessment.     0545: Slept on & off thru night. Needs attended. .     0633: Lab called for critical value, Platelets 7. Paged & talked to Dr Booker. Mentioned also about the bloody urine that started around 0530. No new order given. \" Let the day crew take care of it \"     0743:Bedside and Verbal shift change report given to Donte NAGEL  (oncoming nurse) by Flor NAGEL (offgoing nurse). Report included the following information Nurse Handoff Report, Intake/Output, MAR, Recent Results, and Cardiac Rhythm Afib .

## 2024-01-08 NOTE — PROGRESS NOTES
Report given to Home NAGEL on [address] [place] [address]. Time for questions was provided. Patient transferred to [address] [place] [address] via transport.

## 2024-01-08 NOTE — PALLIATIVE CARE
Palliative Medicine     Palliative team for follow up visit at bedside with Mr. Sorto. Patient alerted, opened eyes, followed simple command but not fully tracking. Slight shaking movements noted though body and warm to touch. Patient now NPO. Labs noted Plt count 7. H&H - 7.7 and 23.7. WBC 2. Call placed to Ms. Elodia Najera. Provided medical update including patient's lack of swallow ability, shaking and low blood counts. Team discussed the transition of care for Mr. Sorto to one of comfort, Ms. Najera agreed.  Comfort measures discussed including no further labs, x rays, IVAB, IVF\"s or blood or platelet transfusions.  Ms Elodia Garcia approved to transition care to one of comfort.   Discussed comfort medications; Roxanol and ativan to help alleviate pain, shortness of breath and dyspnea. Palliative team discussed the end to obtain the EEG as it will provide information concerning possible seizures which can with managed with medication. Ms. Clifton agreed to continue with the EEG test. She is ware no further blood products will be transfused. Comfort orders placed by Palliative NP after consult with Attending. Bedside RNDonte updated to the change in level of care.       CODE STATUS - DNR/DNI COMFORT MEASURES       Caterina CYRN, RN, [facility]  Palliative Medicine Inpatient RN  Palliative COPE Line: [phone]

## 2024-01-08 NOTE — CARE COORDINATION
Met with pt's niece Elodia Garcia and Luis Felipe Hsu Hospice Liaison with [facility].  Ms Elodia stated pt will be hospice and she is requesting for pt to be placed in a nursing facility for hospice.  Informed her  pt only has Medicare and if going to facility, it has to be private pay.  She stated they can apply for Medicaid.  Informed her Medicaid usually takes 45 days.  She stated she will have to think about about this and come up with a plan.    Luis Felipe of [facility] requesting for pt's clinicals to be faxed to them at [phone].      Sent message to Soledad Garrett of [employer] to screen pt for Medicaid.      Bethany Vazquez, GERN RN  Care Management

## 2024-01-08 NOTE — PLAN OF CARE
Problem: SLP Adult - Impaired Swallowing  Goal: By Discharge: Advance to least restrictive diet without signs or symptoms of aspiration for planned discharge setting.  See evaluation for individualized goals.  Description: Patient will:  1. Tolerate PO trials with 0 s/s overt distress in 4/5 trials  2. Utilize compensatory swallow strategies/maneuvers (decrease bite/sip, size/rate, alt. liq/sol) with min cues in 4/5 trials  3. Perform oral-motor/laryngeal exercises to increase oropharyngeal swallow function with min cues  4. Complete an objective swallow study (i.e., MBSS) to assess swallow integrity, r/o aspiration, and determine of safest LRD, min A    Recommend:   NPO with alternate means of nutrition/hydration vs comfort feeds   Strict aspiration precautions (HOB >30 degrees at all times, Oral care TID)    Outcome: Progressing  SPEECH LANGUAGE PATHOLOGY DYSPHAGIA TREATMENT    Patient: Chad Sorto (73 y.o. male)  Date: 1/8/2024  Diagnosis: Paroxysmal A-fib (HCC) [I48.0]  Sepsis (HCC) [A41.9]  Severe sepsis (HCC) [A41.9, R65.20] Severe sepsis (HCC)      Precautions: Standard, aspiration  PLOF: As per H&P      ASSESSMENT:  Pt seen for follow up dysphagia management. Pt with response to name, however nonverbal this am. Pt demo weak/wet cough following one 1/2 tsp trial of pureed solids. Laryngeal elevation weak/delayed to palpation. No further Po trials given secondary to high risk of aspiration. Oral care completed with toothette/mouthwash with suctioning. Pt demo inadequate sensory/motor awareness/strength for safe and adequate nutritional intake. Pt continues to appear to be at an extremely high risk of aspiration, malnutrition, and dehydration with PO. Rec NPO with consideration of an alternate means of nutrition/hydration vs comfort feeds, aspiration precautions, and oral care with suctioning. Will continue to follow for further dysphagia management.      Progression toward goals:  []         Improving  eyes, Increase in RR  Pharyngeal Phase Characteristics: Poor endurance, Altered vocal quality, Audible swallow, Suspected pharyngeal residue  Effective Modifications: None  Cues for Modifications: Maximal                DYSPHAGIA DIAGNOSIS: Dysphagia Diagnosis: Moderate oral stage dysphagia, Severe pharyngeal stage dysphagia    PAIN:  Start of Tx: 0  End of Tx: 0     After treatment:   []              Patient left in no apparent distress sitting up in chair  [x]              Patient left in no apparent distress in bed  [x]              Call bell left within reach  [x]              Nursing notified  []              Family present  []              Caregiver present  []              Bed alarm activated      COMMUNICATION/EDUCATION:   [x]            Aspiration precautions; swallow safety; compensatory techniques provided via demonstration, verbalization and teach back of comprehension  []         Patient/family have participated as able in goal setting and plan of care.  []            Patient/family agree to work toward stated goals and plan of care.  []            Patient understands intent and goals of therapy, neutral about participation.  []            Patient unable to participate in goal setting/plan of care secondary to cognition, hearing/vision deficits; education ongoing with interdisciplinary staff   []            Handout regarding diet recommendations and thickener instructions provided.  []         Posted safety precautions in patient's room.    Thank you for this referral.    Glenna Billy M.S. CCC-SLP  Speech Language Pathologist

## 2024-01-08 NOTE — PROGRESS NOTES
[facility] - Progress Note    Admit Date: 1/1/2024  Attending Cardiologist: Dr. Velásquez     Assessment:     -AMS. CT Head negative for acute findings.   -Severe sepsis in setting of PNA.  -Paroxysmal atrial flutter with RVR, physiologic in setting of above. Cardioversion x 2 in the ED, subsequently started on IV amio gtt. On Eliquis and coreg as outpatient.   -ZULMA on CKD with hyperkalemia.   -NSTEMI type 2 in setting of above, troponin not c/w primary ACS. No plans for further coronary evaluation.   -Chronic HFpEF, not in exacerbation.   Echo 11/24/2023 with LVEF 60-65% with normal wall motion, grade I diastolic dysfunction  Echo this admission, LVEF 55-60%  -Moderate pericardial effusion.   -Metastatic Right pleural effusion.   -Hx PE 11/2023.   -Stage IV adenocarcinoma lung, on chemo.   -HTN        Consult by Dr. Velásquez     Plan:     -D/c IV amio gtt, currently in SR. Discontinue drip. Start amiodarone 200 mg daily once able to tolerate po, orders placed.   -Not on anticoagulation currently d/t pancytopenia.   Addendum: Patient transitioning to comfort measures, will sign off and be available if needed.     Subjective:     NAD.     Objective:      Patient Vitals for the past 8 hrs:   Temp Pulse Resp BP SpO2   01/08/24 0827 97.3 °F (36.3 °C) -- 20 (!) 128/109 --   01/08/24 0400 97.6 °F (36.4 °C) 74 22 (!) 102/57 100 %   01/08/24 0300 -- 84 25 (!) 109/55 100 %   01/08/24 0200 -- 73 16 (!) 106/42 --           Patient Vitals for the past 96 hrs:   Weight   01/07/24 0600 62 kg (136 lb 11 oz)         TELE: AF>SR               Current Facility-Administered Medications   Medication Dose Route Frequency    0.9 % sodium chloride infusion   IntraVENous PRN    potassium chloride 20 mEq/50 mL IVPB (Central Line)  20 mEq IntraVENous PRN    Or    potassium chloride 10 mEq/100 mL IVPB (Peripheral Line)  10 mEq IntraVENous PRN    magnesium sulfate 2000 mg in 50 mL IVPB premix  2,000 mg IntraVENous PRN    sodium

## 2024-01-08 NOTE — PROGRESS NOTES
Palliative Medicine  [facility]: [phone]  [facility]: [phone]   [facility]: [phone]    Patient Name: Chad Sorto  YOB: 1950    Date of Initial Consult: 1/4/2024  Follow up 1/8/2024  Reason for Consult: goals of care   Requesting Provider: Dr Fowler    Primary Care Physician: No primary care provider on file.      SUMMARY:   Chad Sorto is a 73 y.o. year old with a past history of Stage IV NSCLC, pulmonary hypertension, CKD, gout, recent acute PE who was admitted on 1/1/2024 from home with a diagnosis of sepsis. Current medical issues leading to Palliative Medicine involvement include: 73-year-old male with stage IV non-small lung cancer who was found at home poorly responsive.  He was admitted to the hospital on 1/1/2024 initially to the ICU with sepsis was found to be in a flutter A-fib with RVR he was started on heparin drip for pulmonary embolus started on IV antibiotics.  Palliative medicine is consulted for goals of care discussions.    1/8/2024 opened his eyes to his name and greeted me however could not answer any questions. Denied pain. Weak ill appearing. Discussed with Elodia, no improving. Move to comfort now.     1/5/2024  a bit more alert this am, answered a few questions however did not engage in conversation. Appears weak ill.      PALLIATIVE DIAGNOSES:   Goals of care/advance care plan discussions  Sepsis  Stage IV lung cancer  ZULMA  Debility       PLAN:   Goals of care/advance care plan discussions     1/8/2024 seen this am along with Ms Angel RN. Mr Sorto will alert to his name but not answer questions. Weak, ill appearing. No real improvement over the weekend despite aggressive treatment. Patient not able to participate in his medical decisions. We called his niece Elodia ( MPOA). Brief update on his medical condition. He is doing poorly with poor prognosis. Elodia shares patient was able to tell her he wanted to be

## 2024-01-08 NOTE — PROGRESS NOTES
Hospitalist Progress Note    NAME:  Chad Sorto   :   1950   MRN:   [id number]     Date/Time:  2024  Subjective: pt is very complicated with multiple issues;   Chief Complaint:    Patient remain confused.  Paged by the palliative care patient has been transition to comfort cares now.        Review of Systems:  Y  N       Y  N  []   []    Fever/chills                                               []   []    Chest Pain  []   []    Cough                                                       []   []    Diarrhea   []   []    Sputum                                                     []   []    Constipation  []   []    SOB/MORALES                                                []   []    Nausea/Vomit  []   []    Abd Pain                                                    []   []    Tolerating PT  []   []    Dysuria                                                      []   []    Tolerating Diet     [x]  Unable to obtain  ROS due to  [x]  mental status change  []  sedated   []  intubated     Past Med History and Social history reviewed. No changes.   [x]  Current Medication list and allergies reviewed*    Objective:   Vitals:  BP (!) 118/59   Pulse 86   Temp 98.4 °F (36.9 °C) (Axillary)   Resp 20   Ht 1.778 m (5' 10\")   Wt 62 kg (136 lb 11 oz)   SpO2 100%   BMI 19.61 kg/m²   Temp (24hrs), Av.9 °F (36.6 °C), Min:97.2 °F (36.2 °C), Max:99.2 °F (37.3 °C)      Last 24hr Input/Output:    Intake/Output Summary (Last 24 hours) at 2024 1407  Last data filed at 2024 0659  Gross per 24 hour   Intake 1659.46 ml   Output 750 ml   Net 909.46 ml        PHYSICAL EXAM:  Gen:  [x]  WD []  WN  []  cachectic  []  thin  []  obese  [x]  disheveled             []   Critically ill or  []  Chronically ill appearing    HEENT:   []   red/pink conjunctivae [x]  pale conjunctivae                  PERRL  []  yes  []  no        hearing intact to voice []  yes  []  No               []  moist or  [x]  dry  Mucosa  NECK:

## 2024-01-08 NOTE — PROGRESS NOTES
Bedside shift change report given to Flor RN (oncoming nurse) by Frances RN (offgoing nurse). Report included the following information Nurse Handoff Report, Intake/Output, MAR, Cardiac Rhythm Afib, and Alarm Parameters.

## 2024-01-09 PROBLEM — N17.9 AKI (ACUTE KIDNEY INJURY) (HCC): Status: ACTIVE | Noted: 2024-01-09

## 2024-01-09 PROCEDURE — 6360000002 HC RX W HCPCS: Performed by: NURSE PRACTITIONER

## 2024-01-09 PROCEDURE — 2580000003 HC RX 258

## 2024-01-09 PROCEDURE — 6370000000 HC RX 637 (ALT 250 FOR IP): Performed by: PHYSICIAN ASSISTANT

## 2024-01-09 PROCEDURE — 94761 N-INVAS EAR/PLS OXIMETRY MLT: CPT

## 2024-01-09 PROCEDURE — 92526 ORAL FUNCTION THERAPY: CPT

## 2024-01-09 PROCEDURE — 99232 SBSQ HOSP IP/OBS MODERATE 35: CPT | Performed by: NURSE PRACTITIONER

## 2024-01-09 PROCEDURE — 99232 SBSQ HOSP IP/OBS MODERATE 35: CPT | Performed by: INTERNAL MEDICINE

## 2024-01-09 PROCEDURE — 6370000000 HC RX 637 (ALT 250 FOR IP): Performed by: NURSE PRACTITIONER

## 2024-01-09 PROCEDURE — 2700000000 HC OXYGEN THERAPY PER DAY

## 2024-01-09 PROCEDURE — 1100000003 HC PRIVATE W/ TELEMETRY

## 2024-01-09 RX ORDER — SCOLOPAMINE TRANSDERMAL SYSTEM 1 MG/1
1 PATCH, EXTENDED RELEASE TRANSDERMAL
Status: DISCONTINUED | OUTPATIENT
Start: 2024-01-09 | End: 2024-01-12 | Stop reason: HOSPADM

## 2024-01-09 RX ADMIN — GLYCOPYRROLATE 0.2 MG: 0.2 INJECTION INTRAMUSCULAR; INTRAVENOUS at 13:19

## 2024-01-09 RX ADMIN — AMIODARONE HYDROCHLORIDE 200 MG: 200 TABLET ORAL at 08:06

## 2024-01-09 RX ADMIN — SODIUM CHLORIDE, PRESERVATIVE FREE 10 ML: 5 INJECTION INTRAVENOUS at 22:12

## 2024-01-09 ASSESSMENT — PAIN SCALES - GENERAL
PAINLEVEL_OUTOF10: 0

## 2024-01-09 ASSESSMENT — PAIN SCALES - WONG BAKER: WONGBAKER_NUMERICALRESPONSE: 0

## 2024-01-09 NOTE — ACP (ADVANCE CARE PLANNING)
This is advance Care Planning Conversation    Pertinent diagnoses: stage IV lung cancer not eligible for any chemotherapy, chronic heart failure, ZULMA, acute encephalopathy , sepsis     The patient and/or family consented to a voluntary Advance Care Planning conversation. Individuals present for the conversation: Niece Elodia who is also CRISTIANA, myself and Ms Ortiz RN     Summary of the conversation: Discussed care decisions with Elodia. She is aware patient per oncology is no longer eligible for chemotherapy for stage IV lung cancer. He is declining despite aggressive treatment. Discussed moving to comfort measures of which she was agreeable.       Outcome of the conversation and documents completed: agree with below details of ACP conversation. Patient now on comfort measures     I spent 35 minutes providing separately identifiable ACP services with the patient and/or surrogate decision maker in a voluntary, in-person conversation discussing the patient's wishes and goals as detailed in the above note.

## 2024-01-09 NOTE — PLAN OF CARE
Problem: Safety - Adult  Goal: Free from fall injury  1/9/2024 0915 by Enrique Grace RN  Outcome: Progressing  1/8/2024 2315 by Riya Green RN  Outcome: Progressing     Problem: Pain  Goal: Verbalizes/displays adequate comfort level or baseline comfort level  1/9/2024 0915 by Enrique Grace RN  Outcome: Progressing  1/8/2024 2315 by Riya Green RN  Outcome: Progressing     Problem: Skin/Tissue Integrity  Goal: Absence of new skin breakdown  Description: 1.  Monitor for areas of redness and/or skin breakdown  2.  Assess vascular access sites hourly  3.  Every 4-6 hours minimum:  Change oxygen saturation probe site  4.  Every 4-6 hours:  If on nasal continuous positive airway pressure, respiratory therapy assess nares and determine need for appliance change or resting period.  1/9/2024 0915 by Enrique Grace RN  Outcome: Progressing  1/8/2024 2315 by Riya Green RN  Outcome: Progressing     Problem: Nutrition Deficit:  Goal: Optimize nutritional status  1/9/2024 0915 by Enrique Grace RN  Outcome: Progressing  1/8/2024 2315 by Riya Green RN  Outcome: Progressing     Problem: Discharge Planning  Goal: Discharge to home or other facility with appropriate resources  1/9/2024 0915 by Enrique Grace RN  Outcome: Progressing  1/8/2024 2315 by Riya Green RN  Outcome: Progressing  Flowsheets (Taken 1/8/2024 2000)  Discharge to home or other facility with appropriate resources: Identify barriers to discharge with patient and caregiver

## 2024-01-09 NOTE — PALLIATIVE CARE
Palliative Medicine     Palliative Medicine team members rounded on comfort care patient. Patient with wet cough and moderate secretions. No outward signs of pain noted. Mr. Sorto was lying in bed, awake and able to answer simple questions with soft voice.  He had no c/o pain, shortness of breath or nausea. He stated he was not hungry and declined any assistance with fluids. Team spoke with bedside RN to provide a patient dose of Robinal for secretion management.  Orders for Scopolamine patch and oral/Yankauer suction written.     CODE STATUS- DNR/DNI POST on file     Caterina CYRN, RN, [facility]  Palliative Medicine Inpatient RN  Palliative COPE Line: [phone]

## 2024-01-09 NOTE — PLAN OF CARE
Problem: SLP Adult - Impaired Swallowing  Goal: By Discharge: Advance to least restrictive diet without signs or symptoms of aspiration for planned discharge setting.  See evaluation for individualized goals.  Description: Patient will:  1. Tolerate PO trials with 0 s/s overt distress in 4/5 trials - N/A  2. Utilize compensatory swallow strategies/maneuvers (decrease bite/sip, size/rate, alt. liq/sol) with min cues in 4/5 trials - N/A  3. Perform oral-motor/laryngeal exercises to increase oropharyngeal swallow function with min cues - N/A  4. Complete an objective swallow study (i.e., MBSS) to assess swallow integrity, r/o aspiration, and determine of safest LRD, min A - N/A    Recommend:   NPO with alternate means of nutrition/hydration vs comfort feeds   Strict aspiration precautions (HOB >30 degrees at all times, Oral care TID)    Outcome: Completed  SPEECH LANGUAGE PATHOLOGY DYSPHAGIA TREATMENT & DISCHARGE    Patient: Chad Sorto (73 y.o. male)  Date: 1/9/2024  Diagnosis: Paroxysmal A-fib (HCC) [I48.0]  Sepsis (HCC) [A41.9]  Severe sepsis (HCC) [A41.9, R65.20] Severe sepsis (HCC)      Precautions: Aspiration  PLOF: As per H&P     ASSESSMENT:  Pt seen for follow up dysphagia management. Pt seen sitting up in bed and responsive to name, however continues to be nonverbal. Pt with congestive/labored breathing at baseline. Pt demo weak/wet cough following 1/2 tsp of nectar thick liquids. Laryngeal elevation weak/delayed to palpation. No further Po trials given secondary to high risk of aspiration. Oral care completed with toothette/mouthwash, however Pt refused suctioning. Pt demo inadequate sensory/motor awareness/strength for safe and adequate nutritional intake. Per nursing/chart, Pt will be discharged home with hospice and comfort feeds. Will complete order set, please re-consult if needed.     Progression toward goals:  []         Goals met/approximated  [x]         Not making progress/Not appropriate - will

## 2024-01-09 NOTE — PROGRESS NOTES
Comprehensive Nutrition Assessment    Type and Reason for Visit:  Reassess    Nutrition Recommendations/Plan:   Provide nutrition interventions consistent with plan of care goals for patient; comfort measures, diet for comfort feeds     Malnutrition Assessment:  Malnutrition Status:  Severe malnutrition (01/03/24 1453)    Context:  Acute Illness     Findings of the 6 clinical characteristics of malnutrition:  Energy Intake:  No significant decrease in energy intake  Weight Loss:  Greater than 5% over 1 month     Body Fat Loss:  Moderate body fat loss Triceps, Fat Overlying Ribs   Muscle Mass Loss:  Moderate muscle mass loss Clavicles (pectoralis & deltoids), Calf (gastrocnemius), Hand (interosseous)  Fluid Accumulation:  No significant fluid accumulation     Strength:  Not Performed    Nutrition Assessment:    Pt admitted with severe sepsis; has recent diagnosis of 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 Diet changed to NPO 1/4 per SLP. RRT called 1/4 2/2 melena. SLP recommended NPO with alternate means of nutrition/ hydration versus comfort feeds. Palliative following; pt is DNR/ DNI, comfort measures, no feeding tubes; POST on file. SLP signed off on 1/9. Po diet for comfort feeds started on 1/8.    Nutrition Related Findings:    BM 1/8.  No edema. Pertinent meds and labs reviewed. Wound Type: None       Current Nutrition Intake & Therapies:    Average Meal Intake: Unable to assess  Average Supplements Intake: None Ordered  ADULT DIET; Full Liquid; Mildly Thick (Nectar)    Anthropometric Measures:  Height: 177.8 cm (5' 10\")  Ideal Body Weight (IBW): 166 lbs (75 kg)    Admission Body Weight: 61.7 kg (136 lb 0.4 oz)  Current Body Weight: 62 kg (136 lb 11 oz), 81.9 % IBW. Weight Source: Not Specified  Current BMI (kg/m2): 19.6  Usual Body Weight: 66.1 kg (145 lb 13 oz)  % Weight Change (Calculated): -6.7  Weight Adjustment For: No Adjustment  BMI Categories: Underweight (BMI less than 22) age over

## 2024-01-09 NOTE — CARE COORDINATION
Pt's [facility]  Tamiko Soliman ([phone] ext. [phone] contacted  to discuss patients current condition. She stated the patients niece called inquiring if the [facility] will be able to assist the patient with hospice services. She asked Cm to send patient Hospice order, eval, and treatment. Cm faxed pts' clinicals to [phone] and put attention to Hospice request. She also provided Cm another fax  number incase the first one was not working [phone].       Tamiko stated the hospice team will look over patients clinicals to see if they are able to accept him.     CM spoke with the patients nieces Elodia Najera and Ericka Carrasco who stated patient lives alone and has no family help. The nieces stated they both work and is unable to assist patients needs. They discussed wanting to send patient to a LTC facility however, patient only has medicare. Elodia stated she is going to apply for medicaid but she understands it could take up to 45 days. The patient has limited financial income and cannot afford to pay for private duty around the clock care.     MONY Bentley

## 2024-01-09 NOTE — PLAN OF CARE
Problem: Safety - Adult  Goal: Free from fall injury  1/9/2024 0916 by Enrique Grace RN  Outcome: Progressing  1/9/2024 0915 by Enrique Grace RN  Outcome: Progressing  1/8/2024 2315 by Riya Green RN  Outcome: Progressing     Problem: Pain  Goal: Verbalizes/displays adequate comfort level or baseline comfort level  1/9/2024 0916 by Enrique Grace RN  Outcome: Progressing  1/9/2024 0915 by Enrique Grace RN  Outcome: Progressing  1/8/2024 2315 by Riya Green RN  Outcome: Progressing     Problem: Skin/Tissue Integrity  Goal: Absence of new skin breakdown  Description: 1.  Monitor for areas of redness and/or skin breakdown  2.  Assess vascular access sites hourly  3.  Every 4-6 hours minimum:  Change oxygen saturation probe site  4.  Every 4-6 hours:  If on nasal continuous positive airway pressure, respiratory therapy assess nares and determine need for appliance change or resting period.  1/9/2024 0916 by Enrique Grace RN  Outcome: Progressing  1/9/2024 0915 by Enrique Grace RN  Outcome: Progressing  1/8/2024 2315 by Riya Green RN  Outcome: Progressing     Problem: Nutrition Deficit:  Goal: Optimize nutritional status  1/9/2024 0916 by Enrique Grace RN  Outcome: Progressing  1/9/2024 0915 by Enrique Grace RN  Outcome: Progressing  1/8/2024 2315 by Riya Green RN  Outcome: Progressing     Problem: Discharge Planning  Goal: Discharge to home or other facility with appropriate resources  1/9/2024 0916 by Enrique Grace RN  Outcome: Progressing  1/9/2024 0915 by Enrique Grace RN  Outcome: Progressing  1/8/2024 2315 by Riya Green RN  Outcome: Progressing  Flowsheets (Taken 1/8/2024 2000)  Discharge to home or other facility with appropriate resources: Identify barriers to discharge with patient and caregiver

## 2024-01-09 NOTE — PROGRESS NOTES
Palliative Medicine  Critical access hospital: [phone]  [facility]: [phone]   [facility]: [phone]    Patient Name: Chad Sorto  YOB: 1950    Date of Initial Consult: 1/4/2024  Follow up 1/9/2024   Reason for Consult: goals of care   Requesting Provider: Dr Fowler    Primary Care Physician: No primary care provider on file.      SUMMARY:   Chad Sorto is a 73 y.o. year old with a past history of Stage IV NSCLC, pulmonary hypertension, CKD, gout, recent acute PE who was admitted on 1/1/2024 from home with a diagnosis of sepsis. Current medical issues leading to Palliative Medicine involvement include: 73-year-old male with stage IV non-small lung cancer who was found at home poorly responsive.  He was admitted to the hospital on 1/1/2024 initially to the ICU with sepsis was found to be in a flutter A-fib with RVR he was started on heparin drip for pulmonary embolus started on IV antibiotics.  Palliative medicine is consulted for goals of care discussions.    1/9/2024 reclining in bed alert but remains confused. Congested. No appetite.     1/8/2024 opened his eyes to his name and greeted me however could not answer any questions. Denied pain. Weak ill appearing. Discussed with Elodia, no improving. Move to comfort now.     1/5/2024  a bit more alert this am, answered a few questions however did not engage in conversation. Appears weak ill.      PALLIATIVE DIAGNOSES:   Goals of care/advance care plan discussions  Sepsis  Stage IV lung cancer  ZULMA  Debility       PLAN:   Goals of care/advance care plan discussions     1/9/2024 follow up today. Remains on comfort measures. He is alert congested. Tells me he is not hungry. Currently appears comfortable. Comfort measures in place. Discussed with BSRN will medicate with Robinul for airway secretions. Weak cough. Comfort feeds as tolerated. Added Scopolamine patch for airway secretions. No family at bedside.  [facility]      No family history on file.  History reviewed, no pertinent family history.  Social History     Tobacco Use    Smoking status: Never    Smokeless tobacco: Never   Substance Use Topics    Alcohol use: Not Currently     No Known Allergies   Current Facility-Administered Medications   Medication Dose Route Frequency    amiodarone (CORDARONE) tablet 200 mg  200 mg Oral Daily    morphine (PF) injection 1 mg  1 mg IntraVENous Q1H PRN    Or    morphine (PF) injection 4 mg  4 mg IntraVENous Q2H PRN    glycopyrrolate (ROBINUL) injection 0.2 mg  0.2 mg IntraVENous Q4H PRN    LORazepam (ATIVAN) injection 0.5 mg  0.5 mg IntraVENous Q4H PRN    potassium chloride 20 mEq/50 mL IVPB (Central Line)  20 mEq IntraVENous PRN    Or    potassium chloride 10 mEq/100 mL IVPB (Peripheral Line)  10 mEq IntraVENous PRN    dextrose 5 % and 0.45 % NaCl with KCl 40 mEq infusion   IntraVENous Continuous    sodium chloride flush 0.9 % injection 5-40 mL  5-40 mL IntraVENous 2 times per day    sodium chloride flush 0.9 % injection 5-40 mL  5-40 mL IntraVENous PRN    ondansetron (ZOFRAN-ODT) disintegrating tablet 4 mg  4 mg Oral Q8H PRN    Or    ondansetron (ZOFRAN) injection 4 mg  4 mg IntraVENous Q6H PRN    polyethylene glycol (GLYCOLAX) packet 17 g  17 g Oral Daily PRN    acetaminophen (TYLENOL) tablet 650 mg  650 mg Oral Q6H PRN        LAB AND IMAGING FINDINGS:     Lab Results   Component Value Date/Time    WBC 2.1 01/08/2024 04:50 AM    HGB 7.7 01/08/2024 04:50 AM    PLT 7 01/08/2024 04:50 AM     Lab Results   Component Value Date/Time     01/08/2024 04:50 AM    K 3.8 01/08/2024 04:50 AM     01/08/2024 04:50 AM    CO2 25 01/08/2024 04:50 AM    BUN 25 01/08/2024 04:50 AM    MG 1.6 01/08/2024 04:50 AM    PHOS 1.4 01/08/2024 04:50 AM      Lab Results   Component Value Date/Time    GLOB 3.7 01/04/2024 06:35 PM     Lab Results   Component Value Date/Time    INR 1.4 01/04/2024 06:35 PM    APTT 49.2 01/04/2024 03:44 AM

## 2024-01-09 NOTE — PROGRESS NOTES
Hospitalist Progress Note    NAME:  Chad Sorto   :   1950   MRN:   [id number]     Date/Time:  2024  Subjective:     Pt is difficult to understand. Denies sob. No reports of pain. Appeared confused, comfortable.   Objective:   Vitals:  /72   Pulse (!) 116   Temp 98.3 °F (36.8 °C) (Oral)   Resp 20   Ht 1.778 m (5' 10\")   Wt 62 kg (136 lb 11 oz)   SpO2 96%   BMI 19.61 kg/m²   Temp (24hrs), Av.3 °F (36.8 °C), Min:97.8 °F (36.6 °C), Max:99 °F (37.2 °C)      Last 24hr Input/Output:  No intake or output data in the 24 hours ending 24 1316       PHYSICAL EXAM:  Gen:  [x]  WD []  WN  []  cachectic  []  thin  []  obese  [x]  disheveled             []   Critically ill or  []  Chronically ill appearing    HEENT:   []   red/pink conjunctivae [x]  pale conjunctivae                  PERRL  []  yes  []  no        hearing intact to voice []  yes  []  No               [x]  moist or  []  dry  Mucosa  NECK:   supple [x]  yes  []  no      masses []  yes  []  No               thyroid    []  non tender []  tender    RESP:   use of accessory muscles []  yes []  no                BS    [x]  clear  []  wheezing []  rhonchi []  crackles   CARD:  murmur  [x]  yes []  no   []  thrill  []  carotid bruits  afib with RVR                LE edema []  yes  []  no    []  JVD present    ABD:    tenderness []  yes [x]  no   Liver/spleen enlargement []   yes []   no                distended []   yes [x]  no    bowel sound []  normal []  hypo or  []  hyperactive    MSKL:   cyanosis/clubbing []  yes [x]  no         []   Spastic []  Flaccid []  Cog wheeling    SKIN:   Rashes []  yes [x]  no     Ulcers []  present ___               []  normal []  tight to palpitation        skin turgor []  good []  poor []  decreased    NEUR:    very confused a/o x 0 move all ext;     PSYCH:   insight [x]  poor []  good     mood []  depressed []  anxious []  agitated                    Lab Data Reviewed:  No components found for:

## 2024-01-09 NOTE — PLAN OF CARE
Problem: Safety - Adult  Goal: Free from fall injury  Outcome: Progressing     Problem: Pain  Goal: Verbalizes/displays adequate comfort level or baseline comfort level  Outcome: Progressing     Problem: Skin/Tissue Integrity  Goal: Absence of new skin breakdown  Description: 1.  Monitor for areas of redness and/or skin breakdown  2.  Assess vascular access sites hourly  3.  Every 4-6 hours minimum:  Change oxygen saturation probe site  4.  Every 4-6 hours:  If on nasal continuous positive airway pressure, respiratory therapy assess nares and determine need for appliance change or resting period.  Outcome: Progressing     Problem: Nutrition Deficit:  Goal: Optimize nutritional status  Outcome: Progressing     Problem: Discharge Planning  Goal: Discharge to home or other facility with appropriate resources  Outcome: Progressing  Flowsheets (Taken 1/8/2024 2000)  Discharge to home or other facility with appropriate resources: Identify barriers to discharge with patient and caregiver

## 2024-01-10 PROCEDURE — 94761 N-INVAS EAR/PLS OXIMETRY MLT: CPT

## 2024-01-10 PROCEDURE — 2700000000 HC OXYGEN THERAPY PER DAY

## 2024-01-10 PROCEDURE — 2500000003 HC RX 250 WO HCPCS: Performed by: NURSE PRACTITIONER

## 2024-01-10 PROCEDURE — 6370000000 HC RX 637 (ALT 250 FOR IP): Performed by: PHYSICIAN ASSISTANT

## 2024-01-10 PROCEDURE — 1100000003 HC PRIVATE W/ TELEMETRY

## 2024-01-10 PROCEDURE — 2580000003 HC RX 258

## 2024-01-10 PROCEDURE — 99232 SBSQ HOSP IP/OBS MODERATE 35: CPT | Performed by: INTERNAL MEDICINE

## 2024-01-10 PROCEDURE — 99231 SBSQ HOSP IP/OBS SF/LOW 25: CPT | Performed by: INTERNAL MEDICINE

## 2024-01-10 RX ORDER — LORAZEPAM 2 MG/ML
1 CONCENTRATE ORAL EVERY 4 HOURS PRN
Status: DISCONTINUED | OUTPATIENT
Start: 2024-01-10 | End: 2024-01-12 | Stop reason: HOSPADM

## 2024-01-10 RX ORDER — MORPHINE SULFATE 20 MG/ML
5 SOLUTION ORAL EVERY 4 HOURS PRN
Status: DISCONTINUED | OUTPATIENT
Start: 2024-01-10 | End: 2024-01-12 | Stop reason: HOSPADM

## 2024-01-10 RX ADMIN — AMIODARONE HYDROCHLORIDE 200 MG: 200 TABLET ORAL at 10:28

## 2024-01-10 RX ADMIN — SODIUM CHLORIDE, PRESERVATIVE FREE 10 ML: 5 INJECTION INTRAVENOUS at 10:29

## 2024-01-10 RX ADMIN — POTASSIUM CHLORIDE, DEXTROSE MONOHYDRATE AND SODIUM CHLORIDE: 300; 5; 450 INJECTION, SOLUTION INTRAVENOUS at 10:28

## 2024-01-10 RX ADMIN — SODIUM CHLORIDE, PRESERVATIVE FREE 10 ML: 5 INJECTION INTRAVENOUS at 21:00

## 2024-01-10 ASSESSMENT — PAIN SCALES - WONG BAKER
WONGBAKER_NUMERICALRESPONSE: 0
WONGBAKER_NUMERICALRESPONSE: 0

## 2024-01-10 NOTE — CARE COORDINATION
Spoke with Ms. Amairani Escobar from [facility] [phone], she requested patient to be sent out to [facility] located [facility]. [facility]'s Inpatient H[facility] at these facilities.

## 2024-01-10 NOTE — CARE COORDINATION
Cass with [facility] messaged CM in [facility] requesting DNR form.         [facility] per request.             Valencia Carter RN  Case Management [phone]

## 2024-01-10 NOTE — CARE COORDINATION
CM received a phone call from Walker with [facility] asking for eval and treat on Hospice order.       Walker with [facility] said okay for CM to update.         CM updated Hospice order, and uploaded to [facility] for [facility].               Valencia Carter, RN  Case Management [phone]

## 2024-01-10 NOTE — CARE COORDINATION
MELODY spoke with patient's niece Elodia Hills [phone] and confirmed that the discharge plan for patient is LTC [facility] with [facility].         MELODY sent LTC referral for [facility] in [facility], let them know with [facility] contract.       No Hospice order.       MELODY Perfect Served Dr. Evans, that Hospice order is needed.           Valencia Carter, RN  Case Management [phone]

## 2024-01-10 NOTE — PALLIATIVE CARE
Palliative Medicine    Palliative team rounded on comfort patient. He is lying in bed with eyes closed. Alerted to name. Respirations are easy and non labored, oxygen continues. Noted moist cough.     Discharge plan is to transfer to LTC with the support of hospice      Palliative team remains available to provide support to Mr. Sorto and his family during this hospital stay.    CODE STATUS: DNR/DNI, COMFORT MEASURES    Samantha Frey RN  Palliative Medicine Inpatient RN  Palliative COPE Line: [phone]

## 2024-01-10 NOTE — PROGRESS NOTES
1307 Hospice referral received. Chart review in process.     1319 Chart reviewed.  Per documentation, plan is for LTC placement with [facility].   Hospice will continue following at a distance until discharge.     Thank you for the referral to [facility]. If we can be of further assistance please contact [phone].     Brittney Akins MDIV, BSN, RN  Hospice Liaison  [address], [address]  [place] [address]  Office: [phone]  Fax: [phone]  Mobile:  [phone]  Email: [email or web address]

## 2024-01-10 NOTE — CARE COORDINATION
[facility] let CM know that Ms. Escobar with [facility] said plan to discharge tomorrow to LTC [facility].       Mary with [facility] confirmed above information also.       CM messaged Cass with [facility] in [facility], and let them know that discharge plan is tomorrow to LTC [facility] tomorrow with Hospice.               Valencia Carter, RN  Case Management [phone]

## 2024-01-10 NOTE — CARE COORDINATION
[facility] accepted patient in [facility], MELODY opened the chart for LTC facility.           Vlaencia Carter, SHONA  Case Management [phone]

## 2024-01-10 NOTE — PROGRESS NOTES
Palliative Medicine follow-up progress note  [facility]: [phone]  [facility]: [phone]   [facility]: [phone]    Patient Name: Chad Sorto  YOB: 1950    Date of Initial Consult: 1/4/2024  Follow up 1/10/2024   Reason for Consult: goals of care   Requesting Provider: Dr Fowler    Primary Care Physician: No primary care provider on file.      SUMMARY:   Chad Sorto is a 73 y.o. year old with a past history of Stage IV NSCLC, pulmonary hypertension, CKD, gout, recent acute PE who was admitted on 1/1/2024 from home with a diagnosis of sepsis. Current medical issues leading to Palliative Medicine involvement include: 73-year-old male with stage IV non-small lung cancer who was found at home poorly responsive.  He was admitted to the hospital on 1/1/2024 initially to the ICU with sepsis was found to be in a flutter A-fib with RVR he was started on heparin drip for pulmonary embolus started on IV antibiotics.  Palliative medicine is consulted for goals of care discussions.    1/10/24: Patient was seen in presence of palliative care RN.  Patient is awake.  Has had some cough and dyspnea on exertion.  Denies any chest pain.  No nausea or vomiting.    1/9/2024 reclining in bed alert but remains confused. Congested. No appetite.     1/8/2024 opened his eyes to his name and greeted me however could not answer any questions. Denied pain. Weak ill appearing. Discussed with Elodia, no improving. Move to comfort now.     1/5/2024  a bit more alert this am, answered a few questions however did not engage in conversation. Appears weak ill.      PALLIATIVE DIAGNOSES:   Goals of care/advance care plan discussions  Sepsis  Stage IV lung cancer  ZULMA  Debility       PLAN:   Goals of care/advance care plan discussions     1/10/24: Patient was seen in presence of palliative care RN.  Patient continues to have dyspnea on exertion and intermittent cough.  Denies any

## 2024-01-10 NOTE — CARE COORDINATION
CM uploaded patient with clinicals and Hospice order to [facility], and sent Hospice referral to [facility] per [facility].           Valencia Carter RN  Case Management [phone]

## 2024-01-11 VITALS
WEIGHT: 136.69 LBS | RESPIRATION RATE: 20 BRPM | BODY MASS INDEX: 19.57 KG/M2 | SYSTOLIC BLOOD PRESSURE: 74 MMHG | HEIGHT: 70 IN | TEMPERATURE: 98.5 F | DIASTOLIC BLOOD PRESSURE: 51 MMHG | HEART RATE: 145 BPM | OXYGEN SATURATION: 75 %

## 2024-01-11 PROCEDURE — 6360000002 HC RX W HCPCS: Performed by: INTERNAL MEDICINE

## 2024-01-11 PROCEDURE — 6370000000 HC RX 637 (ALT 250 FOR IP): Performed by: PHYSICIAN ASSISTANT

## 2024-01-11 PROCEDURE — 2580000003 HC RX 258

## 2024-01-11 PROCEDURE — 1100000003 HC PRIVATE W/ TELEMETRY

## 2024-01-11 PROCEDURE — 99233 SBSQ HOSP IP/OBS HIGH 50: CPT | Performed by: INTERNAL MEDICINE

## 2024-01-11 PROCEDURE — 94761 N-INVAS EAR/PLS OXIMETRY MLT: CPT

## 2024-01-11 PROCEDURE — 6370000000 HC RX 637 (ALT 250 FOR IP): Performed by: INTERNAL MEDICINE

## 2024-01-11 PROCEDURE — 2700000000 HC OXYGEN THERAPY PER DAY

## 2024-01-11 PROCEDURE — 6360000002 HC RX W HCPCS: Performed by: NURSE PRACTITIONER

## 2024-01-11 PROCEDURE — 99238 HOSP IP/OBS DSCHRG MGMT 30/<: CPT | Performed by: INTERNAL MEDICINE

## 2024-01-11 RX ORDER — MORPHINE SULFATE 2 MG/ML
2 INJECTION, SOLUTION INTRAMUSCULAR; INTRAVENOUS
Status: DISCONTINUED | OUTPATIENT
Start: 2024-01-11 | End: 2024-01-12 | Stop reason: HOSPADM

## 2024-01-11 RX ORDER — LORAZEPAM 2 MG/ML
1 CONCENTRATE ORAL EVERY 4 HOURS
Status: DISCONTINUED | OUTPATIENT
Start: 2024-01-11 | End: 2024-01-12 | Stop reason: HOSPADM

## 2024-01-11 RX ORDER — MORPHINE SULFATE 20 MG/ML
5 SOLUTION ORAL EVERY 4 HOURS
Status: DISCONTINUED | OUTPATIENT
Start: 2024-01-11 | End: 2024-01-12 | Stop reason: HOSPADM

## 2024-01-11 RX ADMIN — SODIUM CHLORIDE, PRESERVATIVE FREE 10 ML: 5 INJECTION INTRAVENOUS at 11:08

## 2024-01-11 RX ADMIN — LORAZEPAM 1 MG: 2 SOLUTION, CONCENTRATE ORAL at 13:06

## 2024-01-11 RX ADMIN — GLYCOPYRROLATE 0.2 MG: 0.2 INJECTION INTRAMUSCULAR; INTRAVENOUS at 18:24

## 2024-01-11 RX ADMIN — GLYCOPYRROLATE 0.2 MG: 0.2 INJECTION INTRAMUSCULAR; INTRAVENOUS at 13:11

## 2024-01-11 RX ADMIN — MORPHINE SULFATE 5 MG: 20 SOLUTION ORAL at 16:53

## 2024-01-11 RX ADMIN — MORPHINE SULFATE 5 MG: 20 SOLUTION ORAL at 13:07

## 2024-01-11 RX ADMIN — MORPHINE SULFATE 5 MG: 20 SOLUTION ORAL at 10:47

## 2024-01-11 RX ADMIN — LORAZEPAM 1 MG: 2 SOLUTION, CONCENTRATE ORAL at 16:53

## 2024-01-11 RX ADMIN — MORPHINE SULFATE 2 MG: 2 INJECTION, SOLUTION INTRAMUSCULAR; INTRAVENOUS at 14:46

## 2024-01-11 RX ADMIN — MORPHINE SULFATE 2 MG: 2 INJECTION, SOLUTION INTRAMUSCULAR; INTRAVENOUS at 18:24

## 2024-01-11 RX ADMIN — AMIODARONE HYDROCHLORIDE 200 MG: 200 TABLET ORAL at 08:08

## 2024-01-11 RX ADMIN — MORPHINE SULFATE 1 MG: 2 INJECTION, SOLUTION INTRAMUSCULAR; INTRAVENOUS at 11:40

## 2024-01-11 ASSESSMENT — PAIN SCALES - PAIN ASSESSMENT IN ADVANCED DEMENTIA (PAINAD)
CONSOLABILITY: 0
FACIALEXPRESSION: 0
BREATHING: 2
NEGVOCALIZATION: 0
BODYLANGUAGE: 0
TOTALSCORE: 2
BREATHING: 2
NEGVOCALIZATION: 0
CONSOLABILITY: 0
FACIALEXPRESSION: 1
TOTALSCORE: 3
BODYLANGUAGE: 0

## 2024-01-11 ASSESSMENT — PAIN SCALES - WONG BAKER
WONGBAKER_NUMERICALRESPONSE: 0
WONGBAKER_NUMERICALRESPONSE: 0

## 2024-01-11 ASSESSMENT — PAIN SCALES - GENERAL
PAINLEVEL_OUTOF10: 0
PAINLEVEL_OUTOF10: 3

## 2024-01-11 NOTE — PALLIATIVE CARE
Palliative Medicine    Palliative team rounded on comfort patient. Patient is lying in bed with head elevated. He is awake, alerted to name only. Respirations are shallow and rapid, gurgling continues, oxygen remains in use. Notified bedside RN to please medicate patient for respiratory discomfort.   Noted that patient is scheduled to go to [facility] today with the support of [facility].    Palliative team remains available to provide support to Mr Sorto and his family during this hospital stay.    CODE STATUS: DNR/DNI, COMFORT MEASURES    Samantha Frey RN  Palliative Medicine Inpatient RN  Palliative COPE Line: [phone]

## 2024-01-11 NOTE — PROGRESS NOTES
Hospitalist Progress Note    NAME:  Chad Sorto   :   1950   MRN:   [id number]     Date/Time:  1/10/2024  Subjective:     Pt appeared comfortable. Denied pain.   Objective:   Vitals:  /69   Pulse (!) 158 Comment: primary rn Mercedez made aware, per rn, pt is comfort measures only at this time  Temp 98.1 °F (36.7 °C) (Axillary)   Resp 22   Ht 1.778 m (5' 10\")   Wt 62 kg (136 lb 11 oz)   SpO2 90%   BMI 19.61 kg/m²   Temp (24hrs), Av.9 °F (36.6 °C), Min:97.6 °F (36.4 °C), Max:98.1 °F (36.7 °C)      Last 24hr Input/Output:    Intake/Output Summary (Last 24 hours) at 1/10/2024 2141  Last data filed at 1/10/2024 1230  Gross per 24 hour   Intake 125 ml   Output 1350 ml   Net -1225 ml          PHYSICAL EXAM:  Gen:  [x]  WD []  WN  []  cachectic  []  thin  []  obese  [x]  disheveled             []   Critically ill or  []  Chronically ill appearing    HEENT:   []   red/pink conjunctivae [x]  pale conjunctivae                  PERRL  []  yes  []  no        hearing intact to voice []  yes  []  No               [x]  moist or  []  dry  Mucosa  NECK:   supple [x]  yes  []  no      masses []  yes  []  No               thyroid    []  non tender []  tender    RESP:   use of accessory muscles []  yes []  no                BS    [x]  clear  []  wheezing []  rhonchi []  crackles   CARD:  murmur  [x]  yes []  no   []  thrill  []  carotid bruits  afib with RVR                LE edema []  yes  []  no    []  JVD present    ABD:    tenderness []  yes [x]  no   Liver/spleen enlargement []   yes []   no                distended []   yes [x]  no    bowel sound []  normal []  hypo or  []  hyperactive    MSKL:   cyanosis/clubbing []  yes [x]  no         []   Spastic []  Flaccid []  Cog wheeling    SKIN:   Rashes []  yes [x]  no     Ulcers []  present ___               []  normal []  tight to palpitation        skin turgor []  good []  poor []  decreased    NEUR:    very confused a/o x 0 move all ext;     PSYCH:   insight

## 2024-01-11 NOTE — CARE COORDINATION
Ms. Escobar with the [facility] informed, CM patient was accepted into [facility], she will be arranging his transportation for  around 2 pm. However, she will call CM back to let her know his  time.     MONY Bentley

## 2024-01-11 NOTE — PROGRESS NOTES
(150 lb)     Blood pressure 96/66, pulse (!) 120, temperature 99.1 °F (37.3 °C), temperature source Axillary, resp. rate 18, height 1.778 m (5' 10\"), weight 62 kg (136 lb 11 oz), SpO2 91 %.    Constitutional: Opens eyes, ill-appearing, nasal cannula in situ, tachypnea, increased work of breathing present.  Cardiovascular: Irregular, tachycardia  Respiratory: Diminished breath sound bilaterally with with poor cough reflex  Gastrointestinal: soft, nontender, nondistended, bowel sounds present  Extremities: No pitting pedal edema  Skin: warm, dry  Neurologic: Opens eyes on tactile commands, does not follow any meaningful verbal commands today, no tremors noted currently, mild restlessness present.  No agitation currently.       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 *    Past Medical History:   Diagnosis Date    CKD (chronic kidney disease) stage 4, GFR 15-29 ml/min (HCC) 11/22/2023    Gout     HTN (hypertension)     Primary hypertension 11/22/2023      Past Surgical History:   Procedure Laterality Date    CT NEEDLE BIOPSY PLEURA PERCUTANEOUS  11/27/2023    CT NEEDLE BIOPSY PLEURA PERCUTANEOUS 11/27/2023 [facility] RAD CT    IR PORT PLACEMENT EQUAL OR GREATER THAN 5 YEARS   AM      Lab Results   Component Value Date/Time    GLOB 3.7 01/04/2024 06:35 PM     Lab Results   Component Value Date/Time    INR 1.4 01/04/2024 06:35 PM    APTT 49.2 01/04/2024 03:44 AM      No results found for: \"IRON\", \"TIBC\", \"IBCT\", \"FERR\"   No results found for: \"PH\", \"PCO2\", \"PO2\"  No components found for: \"GLPOC\"   No results found for: \"CPK\", \"CKMB\", \"TROPONINI\"           Today, I am providing high complexity decision making for patient with the diagnosis of acute respiratory distress and tachypnea/restlessness which poses a threat to life or bodily function, as described in the note.  Will put patient on scheduled dose of sublingual morphine and also give IV morphine as needed.  Patient seems to be imminent.  Will also place him on low-dose of sublingual Ativan.      Disclaimer: Sections of this note are dictated using utilizing voice recognition software, which may have resulted in some phonetic based errors in grammar and contents. Even though attempts were made to correct all the mistakes, some may have been missed, and remained in the body of the document. If questions arise, please contact our department.

## 2024-01-12 NOTE — PROGRESS NOTES
Received report from SHONA Pinon made aware of pt being comfort care, made aware of pt's status change from morning until now. V/s done, B/P 74/51, P-145, resp , 20, Temp 98.5 Axil. Patient has labored breathing, no signs of distress, laying in bed with eyes closed. No movement.

## 2024-01-12 NOTE — PROGRESS NOTES
2020 checked on pt, found pt not breathing, pt is comfort care, called [facility] to come and check on pt.  240 pt pernounced at 2023 with [facility], They called the niece ( Elodia Sorto).  was called, lifenet and supervisor notified.

## 2024-01-12 NOTE — DEATH NOTES
[facility]  Death Pronouncement Note    Patient: Chad Sorto MRN: [id number]   SSN: [id number]  YOB: 1950   Age: 73 y.o.  Sex: male    Admission Date: 1/1/2024 11:53 AM Time of Death: 20:23          Called to patient's bedside for apnea and pulselessness.     Patient lying in bed, mouth open, eyes mostly closed. Unresponsive to voice or painful stimuli. No spontaneous respirations and chest rise absent. No palpable carotid pulse bilaterally. No heart sounds or breath sounds. Pupils fixed and dilated bilaterally. Corneal reflexes absent. Family informed. All questions answered.    Death officially pronounced at 20:23, 1/11/2024.  Attending physician, Dr. Evans, to be notified by nursing.    Vicenta Malone MD, PGY-1  [facility]  1/11/2024 8:34 PM

## 2024-01-12 NOTE — PROGRESS NOTES
responded to Death of  Chad Sorto, who was a 73 y.o.,male,     The  provided the following Interventions:  Provided crisis pastoral care, pastoral support and grief interventions.   Offered prayers on behalf of the patient.   Chart reviewed.      Plan:  Chaplains will continue to follow and will provide pastoral care on an as needed/requested basis and grief support for the family.       Sergio Carey  Spiritual Care   [phone]

## 2024-01-17 NOTE — DISCHARGE SUMMARY
Date of admission: 2024  Date of expiration: 2024      73 y o male w/ h/o metastatic adnoca of the lung, recent acute PE, PTX admitted to the ICU after presenting w/ reports of ams, found down. Admitted to the ICU with sepsis thought to be due to pulmonary source of infection, noted to be hypotensive and also noted to have afib/flutter. Cardioversion attempts made X 2 in the ED then was started on amio drip. He was started on heparin drip for pulmonary embolus started on IV antibiotics. Noted to have ZULMA, NSTEMI type 2.  His stay was complicated by him developing pancytopenia and could not be anticoagulated. Downgraded on 24. Pt continued to do poorly despite aggressive treatment. Palliative care was consulted and pt was transitioned to comfort measures only after discussing with his niece Ms Clifton. He was started on meds for comfort including  scolpamine patch as well as Robinul. Hospice was consulted. Original plan was for pt to be [facility], however, he had acute decompensation and  on 24. His niece was updated on his status after he had the decompensation.